# Patient Record
Sex: FEMALE | Race: WHITE | HISPANIC OR LATINO | Employment: UNEMPLOYED | URBAN - METROPOLITAN AREA
[De-identification: names, ages, dates, MRNs, and addresses within clinical notes are randomized per-mention and may not be internally consistent; named-entity substitution may affect disease eponyms.]

---

## 2017-06-21 ENCOUNTER — ANESTHESIA EVENT (OUTPATIENT)
Dept: GASTROENTEROLOGY | Facility: HOSPITAL | Age: 62
End: 2017-06-21
Payer: COMMERCIAL

## 2017-06-22 ENCOUNTER — GENERIC CONVERSION - ENCOUNTER (OUTPATIENT)
Dept: OTHER | Facility: OTHER | Age: 62
End: 2017-06-22

## 2017-06-22 ENCOUNTER — ANESTHESIA (OUTPATIENT)
Dept: GASTROENTEROLOGY | Facility: HOSPITAL | Age: 62
End: 2017-06-22
Payer: COMMERCIAL

## 2017-06-22 ENCOUNTER — HOSPITAL ENCOUNTER (OUTPATIENT)
Facility: HOSPITAL | Age: 62
Setting detail: OUTPATIENT SURGERY
Discharge: HOME | End: 2017-06-22
Attending: INTERNAL MEDICINE | Admitting: INTERNAL MEDICINE
Payer: COMMERCIAL

## 2017-06-22 VITALS
SYSTOLIC BLOOD PRESSURE: 121 MMHG | TEMPERATURE: 98.3 F | RESPIRATION RATE: 18 BRPM | OXYGEN SATURATION: 97 % | DIASTOLIC BLOOD PRESSURE: 61 MMHG | HEART RATE: 56 BPM

## 2017-06-22 DIAGNOSIS — R10.13 EPIGASTRIC PAIN: ICD-10-CM

## 2017-06-22 DIAGNOSIS — Z12.11 ENCOUNTER FOR SCREENING FOR MALIGNANT NEOPLASM OF COLON: ICD-10-CM

## 2017-06-22 DIAGNOSIS — K21.9 ACID REFLUX DISEASE: ICD-10-CM

## 2017-06-22 PROCEDURE — 88305 TISSUE EXAM BY PATHOLOGIST: CPT | Performed by: INTERNAL MEDICINE

## 2017-06-22 PROCEDURE — 88342 IMHCHEM/IMCYTCHM 1ST ANTB: CPT | Performed by: INTERNAL MEDICINE

## 2017-06-22 RX ORDER — METOPROLOL TARTRATE 50 MG/1
50 TABLET, FILM COATED ORAL 2 TIMES DAILY
COMMUNITY
End: 2018-04-20 | Stop reason: SDUPTHER

## 2017-06-22 RX ORDER — ATORVASTATIN CALCIUM 80 MG/1
80 TABLET, FILM COATED ORAL DAILY
COMMUNITY
End: 2018-04-16 | Stop reason: SDUPTHER

## 2017-06-22 RX ORDER — PROPOFOL 10 MG/ML
INJECTION, EMULSION INTRAVENOUS AS NEEDED
Status: DISCONTINUED | OUTPATIENT
Start: 2017-06-22 | End: 2017-06-22 | Stop reason: SURG

## 2017-06-22 RX ORDER — ISOSORBIDE DINITRATE 30 MG/1
30 TABLET ORAL 4 TIMES DAILY
COMMUNITY
End: 2018-04-20

## 2017-06-22 RX ORDER — LOSARTAN POTASSIUM 100 MG/1
100 TABLET ORAL DAILY
COMMUNITY
End: 2018-04-20 | Stop reason: SDUPTHER

## 2017-06-22 RX ORDER — ASPIRIN 81 MG/1
81 TABLET ORAL DAILY
COMMUNITY
End: 2018-02-08

## 2017-06-22 RX ORDER — SODIUM CHLORIDE 9 MG/ML
125 INJECTION, SOLUTION INTRAVENOUS CONTINUOUS
Status: DISCONTINUED | OUTPATIENT
Start: 2017-06-22 | End: 2017-06-22 | Stop reason: HOSPADM

## 2017-06-22 RX ORDER — PROPOFOL 10 MG/ML
INJECTION, EMULSION INTRAVENOUS CONTINUOUS PRN
Status: DISCONTINUED | OUTPATIENT
Start: 2017-06-22 | End: 2017-06-22 | Stop reason: SURG

## 2017-06-22 RX ORDER — LORATADINE AND PSEUDOEPHEDRINE 10; 240 MG/1; MG/1
1 TABLET, EXTENDED RELEASE ORAL DAILY
COMMUNITY
End: 2018-04-20

## 2017-06-22 RX ORDER — LATANOPROST 50 UG/ML
1 SOLUTION/ DROPS OPHTHALMIC
COMMUNITY

## 2017-06-22 RX ORDER — NITROGLYCERIN 0.4 MG/1
0.4 TABLET SUBLINGUAL
COMMUNITY
End: 2018-12-10 | Stop reason: SDUPTHER

## 2017-06-22 RX ORDER — RANOLAZINE 500 MG/1
500 TABLET, EXTENDED RELEASE ORAL 2 TIMES DAILY
COMMUNITY
End: 2018-04-05 | Stop reason: SDUPTHER

## 2017-06-22 RX ORDER — OMEPRAZOLE 40 MG/1
40 CAPSULE, DELAYED RELEASE ORAL DAILY
COMMUNITY
End: 2018-04-20 | Stop reason: SDUPTHER

## 2017-06-22 RX ORDER — CLOPIDOGREL BISULFATE 75 MG/1
75 TABLET ORAL DAILY
COMMUNITY
End: 2018-02-08

## 2017-06-22 RX ORDER — ACETAMINOPHEN 500 MG
500 TABLET ORAL EVERY 6 HOURS PRN
COMMUNITY

## 2017-06-22 RX ADMIN — PROPOFOL 80 MG: 10 INJECTION, EMULSION INTRAVENOUS at 14:15

## 2017-06-22 RX ADMIN — PROPOFOL 150 MCG/KG/MIN: 10 INJECTION, EMULSION INTRAVENOUS at 14:15

## 2017-06-22 RX ADMIN — SODIUM CHLORIDE: 0.9 INJECTION, SOLUTION INTRAVENOUS at 14:11

## 2017-06-22 RX ADMIN — SODIUM CHLORIDE 125 ML/HR: 0.9 INJECTION, SOLUTION INTRAVENOUS at 14:02

## 2017-06-27 ENCOUNTER — GENERIC CONVERSION - ENCOUNTER (OUTPATIENT)
Dept: OTHER | Facility: OTHER | Age: 62
End: 2017-06-27

## 2017-08-10 ENCOUNTER — ALLSCRIPTS OFFICE VISIT (OUTPATIENT)
Dept: OTHER | Facility: OTHER | Age: 62
End: 2017-08-10

## 2017-08-10 ENCOUNTER — HOSPITAL ENCOUNTER (OUTPATIENT)
Dept: NON INVASIVE DIAGNOSTICS | Facility: HOSPITAL | Age: 62
Discharge: HOME | End: 2017-08-10
Payer: COMMERCIAL

## 2017-08-10 DIAGNOSIS — I82.409 ACUTE EMBOLISM AND THROMBOSIS OF DEEP VEIN OF LOWER EXTREMITY (HCC): ICD-10-CM

## 2017-08-10 DIAGNOSIS — M79.661 PAIN OF RIGHT LOWER LEG: ICD-10-CM

## 2017-08-10 DIAGNOSIS — E78.5 HYPERLIPIDEMIA: ICD-10-CM

## 2017-08-10 DIAGNOSIS — L02.211 CUTANEOUS ABSCESS OF ABDOMINAL WALL: ICD-10-CM

## 2017-08-10 PROCEDURE — 93970 EXTREMITY STUDY: CPT

## 2017-08-14 ENCOUNTER — ALLSCRIPTS OFFICE VISIT (OUTPATIENT)
Dept: OTHER | Facility: OTHER | Age: 62
End: 2017-08-14

## 2017-08-14 ENCOUNTER — APPOINTMENT (OUTPATIENT)
Dept: LAB | Facility: CLINIC | Age: 62
End: 2017-08-14
Payer: COMMERCIAL

## 2017-08-14 DIAGNOSIS — L02.211 CUTANEOUS ABSCESS OF ABDOMINAL WALL: ICD-10-CM

## 2017-08-14 DIAGNOSIS — E78.5 HYPERLIPIDEMIA: ICD-10-CM

## 2017-08-14 LAB
ALBUMIN SERPL BCP-MCNC: 3.6 G/DL (ref 3.5–5)
ALP SERPL-CCNC: 92 U/L (ref 46–116)
ALT SERPL W P-5'-P-CCNC: 23 U/L (ref 12–78)
ANION GAP SERPL CALCULATED.3IONS-SCNC: 8 MMOL/L (ref 4–13)
AST SERPL W P-5'-P-CCNC: 20 U/L (ref 5–45)
BASOPHILS # BLD AUTO: 0.04 THOUSANDS/ΜL (ref 0–0.1)
BASOPHILS NFR BLD AUTO: 1 % (ref 0–1)
BILIRUB SERPL-MCNC: 0.74 MG/DL (ref 0.2–1)
BUN SERPL-MCNC: 12 MG/DL (ref 5–25)
CALCIUM SERPL-MCNC: 9.3 MG/DL (ref 8.3–10.1)
CHLORIDE SERPL-SCNC: 105 MMOL/L (ref 100–108)
CHOLEST SERPL-MCNC: 151 MG/DL (ref 50–200)
CO2 SERPL-SCNC: 26 MMOL/L (ref 21–32)
CREAT SERPL-MCNC: 0.84 MG/DL (ref 0.6–1.3)
EOSINOPHIL # BLD AUTO: 0.46 THOUSAND/ΜL (ref 0–0.61)
EOSINOPHIL NFR BLD AUTO: 6 % (ref 0–6)
ERYTHROCYTE [DISTWIDTH] IN BLOOD BY AUTOMATED COUNT: 13.9 % (ref 11.6–15.1)
EST. AVERAGE GLUCOSE BLD GHB EST-MCNC: 117 MG/DL
GFR SERPL CREATININE-BSD FRML MDRD: 74 ML/MIN/1.73SQ M
GLUCOSE P FAST SERPL-MCNC: 89 MG/DL (ref 65–99)
HBA1C MFR BLD: 5.7 % (ref 4.2–6.3)
HCT VFR BLD AUTO: 39.8 % (ref 34.8–46.1)
HDLC SERPL-MCNC: 46 MG/DL (ref 40–60)
HGB BLD-MCNC: 13.1 G/DL (ref 11.5–15.4)
LDLC SERPL CALC-MCNC: 75 MG/DL (ref 0–100)
LYMPHOCYTES # BLD AUTO: 2.25 THOUSANDS/ΜL (ref 0.6–4.47)
LYMPHOCYTES NFR BLD AUTO: 29 % (ref 14–44)
MCH RBC QN AUTO: 29 PG (ref 26.8–34.3)
MCHC RBC AUTO-ENTMCNC: 32.9 G/DL (ref 31.4–37.4)
MCV RBC AUTO: 88 FL (ref 82–98)
MONOCYTES # BLD AUTO: 0.92 THOUSAND/ΜL (ref 0.17–1.22)
MONOCYTES NFR BLD AUTO: 12 % (ref 4–12)
NEUTROPHILS # BLD AUTO: 4.18 THOUSANDS/ΜL (ref 1.85–7.62)
NEUTS SEG NFR BLD AUTO: 52 % (ref 43–75)
NRBC BLD AUTO-RTO: 0 /100 WBCS
PLATELET # BLD AUTO: 214 THOUSANDS/UL (ref 149–390)
PMV BLD AUTO: 12.7 FL (ref 8.9–12.7)
POTASSIUM SERPL-SCNC: 4.5 MMOL/L (ref 3.5–5.3)
PROT SERPL-MCNC: 7.3 G/DL (ref 6.4–8.2)
RBC # BLD AUTO: 4.52 MILLION/UL (ref 3.81–5.12)
SODIUM SERPL-SCNC: 139 MMOL/L (ref 136–145)
TRIGL SERPL-MCNC: 151 MG/DL
WBC # BLD AUTO: 7.88 THOUSAND/UL (ref 4.31–10.16)

## 2017-08-14 PROCEDURE — 80061 LIPID PANEL: CPT

## 2017-08-14 PROCEDURE — 83036 HEMOGLOBIN GLYCOSYLATED A1C: CPT

## 2017-08-14 PROCEDURE — 85025 COMPLETE CBC W/AUTO DIFF WBC: CPT

## 2017-08-14 PROCEDURE — 80053 COMPREHEN METABOLIC PANEL: CPT

## 2017-08-14 PROCEDURE — 36415 COLL VENOUS BLD VENIPUNCTURE: CPT

## 2017-08-21 ENCOUNTER — ALLSCRIPTS OFFICE VISIT (OUTPATIENT)
Dept: OTHER | Facility: OTHER | Age: 62
End: 2017-08-21

## 2017-08-26 ENCOUNTER — GENERIC CONVERSION - ENCOUNTER (OUTPATIENT)
Dept: OTHER | Facility: OTHER | Age: 62
End: 2017-08-26

## 2017-09-05 ENCOUNTER — TRANSCRIBE ORDERS (OUTPATIENT)
Dept: LAB | Facility: CLINIC | Age: 62
End: 2017-09-05

## 2017-09-05 ENCOUNTER — APPOINTMENT (OUTPATIENT)
Dept: LAB | Facility: CLINIC | Age: 62
End: 2017-09-05
Payer: COMMERCIAL

## 2017-09-05 DIAGNOSIS — R31.21 ASYMPTOMATIC MICROSCOPIC HEMATURIA: Primary | ICD-10-CM

## 2017-09-05 DIAGNOSIS — I82.409 ACUTE EMBOLISM AND THROMBOSIS OF DEEP VEIN OF LOWER EXTREMITY (HCC): ICD-10-CM

## 2017-09-05 LAB
BACTERIA UR QL AUTO: NORMAL /HPF
BILIRUB UR QL STRIP: NEGATIVE
CLARITY UR: NORMAL
COLOR UR: YELLOW
GLUCOSE UR STRIP-MCNC: NEGATIVE MG/DL
HGB UR QL STRIP.AUTO: NEGATIVE
HYALINE CASTS #/AREA URNS LPF: NORMAL /LPF
INR PPP: 1.28 (ref 0.86–1.16)
KETONES UR STRIP-MCNC: NEGATIVE MG/DL
LEUKOCYTE ESTERASE UR QL STRIP: NEGATIVE
NITRITE UR QL STRIP: NEGATIVE
NON-SQ EPI CELLS URNS QL MICRO: NORMAL /HPF
PH UR STRIP.AUTO: 7.5 [PH] (ref 4.5–8)
PROT UR STRIP-MCNC: NEGATIVE MG/DL
PROTHROMBIN TIME: 16.1 SECONDS (ref 12.1–14.4)
RBC #/AREA URNS AUTO: NORMAL /HPF
SP GR UR STRIP.AUTO: 1.01 (ref 1–1.03)
UROBILINOGEN UR QL STRIP.AUTO: 0.2 E.U./DL
WBC #/AREA URNS AUTO: NORMAL /HPF

## 2017-09-05 PROCEDURE — 81001 URINALYSIS AUTO W/SCOPE: CPT

## 2017-09-05 PROCEDURE — 85610 PROTHROMBIN TIME: CPT

## 2017-09-05 PROCEDURE — 36415 COLL VENOUS BLD VENIPUNCTURE: CPT

## 2017-11-02 ENCOUNTER — APPOINTMENT (OUTPATIENT)
Dept: LAB | Facility: CLINIC | Age: 62
End: 2017-11-02
Payer: COMMERCIAL

## 2017-11-02 ENCOUNTER — TRANSCRIBE ORDERS (OUTPATIENT)
Dept: LAB | Facility: CLINIC | Age: 62
End: 2017-11-02

## 2017-11-02 DIAGNOSIS — R31.29 MICROSCOPIC HEMATURIA: Primary | ICD-10-CM

## 2017-11-02 LAB
BACTERIA UR QL AUTO: ABNORMAL /HPF
BILIRUB UR QL STRIP: NEGATIVE
CLARITY UR: CLEAR
COLOR UR: YELLOW
GLUCOSE UR STRIP-MCNC: NEGATIVE MG/DL
HGB UR QL STRIP.AUTO: NEGATIVE
HYALINE CASTS #/AREA URNS LPF: ABNORMAL /LPF
KETONES UR STRIP-MCNC: NEGATIVE MG/DL
LEUKOCYTE ESTERASE UR QL STRIP: ABNORMAL
NITRITE UR QL STRIP: NEGATIVE
NON-SQ EPI CELLS URNS QL MICRO: ABNORMAL /HPF
PH UR STRIP.AUTO: 7 [PH] (ref 4.5–8)
PROT UR STRIP-MCNC: NEGATIVE MG/DL
RBC #/AREA URNS AUTO: ABNORMAL /HPF
SP GR UR STRIP.AUTO: 1.01 (ref 1–1.03)
UROBILINOGEN UR QL STRIP.AUTO: 0.2 E.U./DL
WBC #/AREA URNS AUTO: ABNORMAL /HPF

## 2017-11-02 PROCEDURE — 81001 URINALYSIS AUTO W/SCOPE: CPT

## 2017-11-13 ENCOUNTER — GENERIC CONVERSION - ENCOUNTER (OUTPATIENT)
Dept: OTHER | Facility: OTHER | Age: 62
End: 2017-11-13

## 2017-12-05 ENCOUNTER — ALLSCRIPTS OFFICE VISIT (OUTPATIENT)
Dept: OTHER | Facility: OTHER | Age: 62
End: 2017-12-05

## 2017-12-06 NOTE — PROGRESS NOTES
Assessment  Assessed    1  Angina, class I (413 9) (I20 9)   2  Benign essential hypertension (401 1) (I10)   3  CAD S/P percutaneous coronary angioplasty (414 01,V45 82) (I25 10,Z98 61)   4  Dyslipidemia (272 4) (E78 5)    Plan  Angina, class I    · Metoprolol Tartrate 50 MG Oral Tablet; TAKE 1 TABLET Every 8 hours   Rx By: Raymundo Colon; Dispense: 30 Days ; #:90 Tablet; Refill: 3;For: Angina, class I; KRISTYN = N; Verified Transmission to [id number]; Last Updated By: System, SureScripts; 12/5/2017 1:49:35 PM  Benign essential hypertension, CAD S/P percutaneous coronary angioplasty    · EKG/ECG- POC; Status:Complete;   Done: 61PYB3680   Perform: In Office; [phone]; Last Updated By:Scheier, Maurene Europe; 12/5/2017 1:49:03 PM;Ordered;essential hypertension, CAD S/P percutaneous coronary angioplasty; Ordered By:Darek Rosales;  Dyslipidemia    · Follow-up visit in 1 year Evaluation and Treatment  Follow-up  Status: Complete  Done:15Lqs4290   Ordered;Dyslipidemia; Ordered By: Raymundo Cooln Performed:  Due: 15MTK9739; Last Updated By: Marlyn Sylvester; 12/5/2017 2:00:16 PM    Discussion/Summary  Cardiology Discussion Summary Free Text Note Form [facility]:   Coronary artery disease, PTCA/stent of the LAD into the diagonal  Occluded LAD and occluded LIMA to the LAD, the diagonal collateralizes apical LAD  Mild to moderate disease in the circumflex and RCA system in the setting of a small coronary arteries  Angina class 1 very stable, continue clinical regimen  An echocardiogram 2015 revealed normal left ventricular systolic function and diastolic dysfunction stage I  Stress test prior to revascularization reveal anteroapical ischemia  Chief Complaint  Chief Complaint Free Text Note Form: Pt  here for yearly follow up  Pt  has no complaints  History of Present Illness  Cardiology HPI Free Text Note Form [facility]: Cardiology follow-up   Patient doing very well from a cardiac point of view, she has had not used any nitroglycerin, she is somewhat active  She did suffer serous subcutaneous infections in the abdominal wall that required extensive debridement, and antibiotic therapy, she is slowly recovering from that  She has been compliant with low-cholesterol diet  Lipids recently checked, total cholesterol 151, LDL 75, triglyceride 151, HDL 46, adequate control on high-intensity statin therapy  She also developed DVT of the legs, for which she was put on full anticoagulation, her Plavix was discontinued  She has been compliant with low-sodium diet  Her blood pressures been well controlled  Review of Systems  Cardiology Female ROS:    Cardiac: chest pain,-- rhythm problems-- and-- has heart murmur present, but-- as noted in HPI,-- no fainting/blackouts,-- no signs of swelling,-- no palpitations present,-- no syncope/fainting,-- no AM fatigue-- and-- no witnessed apnea episodes  Skin: non healing sores located on the abdomen, but-- as noted in HPI  Genitourinary: No complaints of recurrent urinary tract infections, frequent urination at night, difficult urination, blood in urine, kidney stones, loss of bladder control, kidney problems, denies any birth control or hormone replacement, is not post menopausal, not currently pregnant  ,-- no blood in urine-- and-- no kidney problems  Psychological: anxiety, but-- no depression  General: trouble sleeping,-- lack of energy/fatigue-- and-- frequent infections, but-- as noted in HPI,-- no appetite changes,-- no changes in weight,-- no fever-- and-- no night sweats  Respiratory: No complaints of shortness of breath, cough with sputum, or wheezing ,-- no shortness of breath-- and-- no hemoptysis    HEENT: No complaints of serious problems, hearing problems, nose problems, throat problems, or snoring ,-- no serious eye problems-- and-- no hearing problems  Gastrointestinal: abdonimal pain, but-- as noted in HPI,-- no liver problems-- and-- no rectal bleeding  Hematologic: blood clots, but-- as noted in HPI,-- no bleeding disorders,-- no anemia-- and-- no excessive bruising  Neurological: numbnes,-- tingling-- and-- daytime sleepiness, but-- no weakness,-- no seizures-- and-- no dizziness  Musculoskeletal: No complaints of arthritis, back pain, or painfull swelling ,-- no arthritis-- and-- no back pain-- No myalgias  Active Problems  Problems    1  Abscess of skin of abdomen (682 2) (L02 211)   2  Acid reflux disease (530 81) (K21 9)   3  Allergic rhinitis (477 9) (J30 9)   4  Angina, class I (413 9) (I20 9)   5  Angina, class III (413 9) (I20 9)   6  Benign essential hypertension (401 1) (I10)   7  CAD S/P percutaneous coronary angioplasty (414 01,V45 82) (I25 10,Z98 61)   8  Colon cancer screening (V76 51) (Z12 11)   9  Coronary artery disease (414 00) (I25 10)   10  Coronary artery disease (414 00) (I25 10)   11  Deep vein thrombosis (DVT) (453 40) (I82 409)   12  Dyslipidemia (272 4) (E78 5)   13  Edema, lower extremity (782 3) (R60 0)   14  Encounter for screening mammogram for breast cancer (V76 12) (Z12 31)   15  Epigastric pain (789 06) (R10 13)   16  Fatigue (780 79) (R53 83)   17  Glaucoma, open angle (365 10,365 70) (H40 10X0)   18  Hearing difficulty (389 9) (H91 90)   19  Heart disease (429 9) (I51 9)   20  Heart disease (429 9) (I51 9)   21  Hypertension (401 9) (I10)   22  Denied: History of Mental disorder   21  Myocardial infarction (410 90) (I21 9)   24  Osteoarthritis of knee (715 36) (M17 10)   25  Pap smear for cervical cancer screening (V76 2) (Z12 4)   26  Prediabetes (790 29) (R73 03)   27  Rib pain on left side (786 50) (R07 81)   28  Right calf pain (729 5) (M79 661)   29  Scoliosis (737 30) (M41 9)   30  Uterine prolapse (618 1) (N81 4)    Past Medical History  Problems    1  History of depression (V11 8) (Z86 59)   2  Denied: History of Mental disorder  Active Problems And Past Medical History Reviewed:    The active problems and past medical history were reviewed and updated today  Surgical History  Problems    1  History of Cath Stent Placement  Surgical History Reviewed: The surgical history was reviewed and updated today  Family History  Mother    1  Denied: Family history of Drug abuse   2  Family history of diabetes mellitus (V18 0) (Z83 3)   3  Family history of myocardial infarction (V17 3) (Z82 49)   4  No family history of mental disorder  Father    5  Denied: Family history of Drug abuse   6  Family history of cerebrovascular accident (CVA) (V17 1) (Z82 3)   7  No family history of mental disorder   8  Patient's father is  (V24 11) (Z80 80)  Sibling    5  Family history of cardiovascular disease (V17 49) (Z82 49)   10  Family history of hypertension (V17 49) (Z82 49)  Sister    6  Family history of hypothyroidism (V18 19) (Z83 49)  Family History Reviewed: The family history was reviewed and updated today  Social History  Problems    · Current non-drinker of alcohol (V49 89) (Z78 9)   · Former smoker (V15 82) (P96 416)   · Denied: History of drug use   · Lives with adult children   · [facility] History Reviewed: The social history was reviewed and is unchanged  Current Meds   1  Acetaminophen 500 MG Oral Tablet; Take 2 Tablet(s) every 6 hours as needed MDD:3000mg; Therapy: 05CVB3414 to (Last Rx:76Afx0907)  Requested for: 86JWK4890 Ordered   2  Aluminum Hydroxide Gel 320 MG/5ML Oral Suspension; USE AS DIRECTED; Therapy: 86AVH6825 to Recorded   3  Aspirin 81 MG TABS; TAKE 1 TABLET DAILY; Therapy: 94IWM0719 to (Evaluate:63Ugx2664)  Requested for: 61MZY7658; Last Rx:69Sze0739 Ordered   4  Atorvastatin Calcium 80 MG Oral Tablet; TAKE 1 TABLET DAILY; Therapy: 90GXY3400 to (Last Rx:26Adg1929)  Requested for: 25LDX1122 Ordered   5  Eliquis 5 MG Oral Tablet; Take one tablet every 12 hours; Therapy: 64ANT6600 to (Evaluate:68Mfn5488)  Requested for: 49Dbw0489; Last Rx:01Kwb1526 Ordered   6   Isosorbide Mononitrate ER 30 MG Oral Tablet Extended Release 24 Hour; TAKE 1 TABLET DAILY; Therapy: 79OVO4346 to 0479 89 05 16)  Requested for: 32AWR2766; Last Rx:08Mar2017 Ordered   7  Latanoprost SOLN; INSTILL 1 DROP IN BOTH EYES AT BEDTIME; Therapy: (Recorded:45Jls6668) to Recorded   8  Loratadine 10 MG Oral Tablet; TAKE 1 TABLET EVERY MORNING AS NEEDED; Therapy: 02FIF5497 to (Evaluate:41Yjb6499)  Requested for: 17Aug2017; Last Rx:17Aug2017; Status: ACTIVE - Renewal Denied Ordered   9  Losartan Potassium 100 MG Oral Tablet; take 1 tablet every day; Therapy: 84FFF2474 to (Evaluate:32Iwz3667)  Requested for: 73TFJ9975; Last Rx:39Bvz8782 Ordered   10  Metoprolol Tartrate 50 MG Oral Tablet; TAKE TABLET Twice daily  Requested for:  23Ozj0257; Last Rx:98Soq5981 Ordered   11  MoviPrep 100 GM Oral Solution Reconstituted; USE AS DIRECTED; Therapy: 99CAK3098 to (Last KJ:08FVS2439)  Requested for: 03MNR0457 Ordered   12  Nitrostat 0 4 MG Sublingual Tablet Sublingual; place 1 tablet under the tongue every 5  minutes up to 3 doses as needed for chest pain; Therapy: 09IIT0091 to (Evaluate:21Oct2016)  Requested for: 96XRU8480; Last  AX:69CEG9550 Ordered   13  Omeprazole 40 MG Oral Capsule Delayed Release; take 1 capsule daily; Therapy: 72LPM2874 to (Evaluate:11Jan2018)  Requested for: 63Rxt4556; Last  Rx:43Zrf8452 Ordered   14  Ranexa 500 MG Oral Tablet Extended Release 12 Hour; take 1 tablet twice a day; Therapy: 48GJJ3762 to )  Requested for: 98Sbk1162; Last  Rx:83Rgs5834 Ordered  Medication List Reviewed: The medication list was reviewed and updated today  Allergies  Medication    1   No Known Drug Allergies    Vitals  Vital Signs    Recorded: 15OIJ8559 01:40PM   Heart Rate 54, Apical   Pulse Quality Regular, Apical   Systolic 431, RUE, Sitting   Diastolic 78, RUE, Sitting   Height 5 ft 4 in   Weight 182 lb    BMI Calculated 31 24   BSA Calculated 1 88       Physical Exam   Constitutional  General appearance: No acute distress, well appearing and well nourished  appears healthy,-- overweight,-- well hydrated-- and-- appearance reflects stated age  Neck  Neck and thyroid: Normal, supple, trachea midline, no thyromegaly  Jugular Veins: the JVP was not elevated  Pulmonary  Respiratory effort: No increased work of breathing or signs of respiratory distress  Respiratory rate: normal  Assessment of respiratory effort revealed normal rhythm and effort  Auscultation of lungs: Clear to auscultation, no rales, no rhonchi, no wheezing, good air movement  Auscultation of the lungs revealed no expiratory wheezing,-- normal expiratory time-- and-- no inspiratory wheezing  no rales or crackles were heard bilaterally  no rhonchi  no friction rub  no wheezing  no diminished breath sounds  no bronchial breath sounds  Cardiovascular  Palpation of heart: Normal PMI, no thrills  The PMI was palpated at the 5th LICS in the midclavicular line  The apical impulse was normal  no precordial heave was noted  Auscultation of heart: Abnormal   The heart rate was normal  The rhythm was regular with premature beats  Heart sounds: normal S1,-- normal S2,-- no gallop heard,-- no click-- and-- the heart sounds were not distant  No pericardial rub  A grade 1 systolic murmur was heard at the RUSB  A grade 1 systolic murmur was heard at the LUSB  Carotid pulses: Normal, 2+ bilaterally  right 2+,-- no bruit heard over the right carotid,-- left 2+-- and-- no bruit heard over the left carotid  Pedal pulses: Normal, 2+ bilaterally  Posterior tibialis pulse was 2+ on the right-- and-- 2+ on the left  Dorsalis pedis pulse was 2+ on the right-- and-- 2+ on the left  Examination of extremities for edema and/or varicosities: Normal    Chest - Chest: Normal   Musculoskeletal Digits and nails: Normal without clubbing or cyanosis  Examination of the extremities revealed no fingernail clubbing-- and-- well perfused fingers    Neurologic - Speech: Normal  Psychiatric - Orientation to person, place, and time: Normal -- Mood and affect: Normal       Results/Data  ECG Report:  Rhythm and rate: sinus bradycardia  Ectopic Beats: Occasional unifocal premature ventricular beats are present  QRS: intraventricular conduction delay-- and-- left ventricular hypertrophy The T waves are inverted in lead(s) I and aVL, consistent with myocardial ischemia, consistent with left ventricular strain  Comparison to prior ECGs: no interval change  Health Management  Colon cancer screening   COLONOSCOPY (GI, SURG); every 10 years; Last 50RWN7449; Next Due: 57Oem2107;  Active    Signatures   Electronically signed by : LONDON Villalobos ; Dec  5 2017  2:04PM EST                       (Author)

## 2018-01-11 NOTE — RESULT NOTES
Verified Results  (1) CBC/PLT/DIFF 77TRY4907 09:53AM Marisela Girard Order Number: [id number]     Test Name Result Flag Reference   WBC COUNT 7 88 Thousand/uL  4 31-10 16   RBC COUNT 4 52 Million/uL  3 81-5 12   HEMOGLOBIN 13 1 g/dL  11 5-15 4   HEMATOCRIT 39 8 %  34 8-46  1   MCV 88 fL  82-98   MCH 29 0 pg  26 8-34 3   MCHC 32 9 g/dL  31 4-37 4   RDW 13 9 %  11 6-15 1   MPV 12 7 fL  8 9-12 7   PLATELET COUNT 849 Thousands/uL  149-390   nRBC AUTOMATED 0 /100 WBCs     NEUTROPHILS RELATIVE PERCENT 52 %  43-75   LYMPHOCYTES RELATIVE PERCENT 29 %  14-44   MONOCYTES RELATIVE PERCENT 12 %  4-12   EOSINOPHILS RELATIVE PERCENT 6 %  0-6   BASOPHILS RELATIVE PERCENT 1 %  0-1   NEUTROPHILS ABSOLUTE COUNT 4 18 Thousands/? ??L  1 85-7 62   LYMPHOCYTES ABSOLUTE COUNT 2 25 Thousands/? ??L  0 60-4 47   MONOCYTES ABSOLUTE COUNT 0 92 Thousand/? ??L  0 17-1 22   EOSINOPHILS ABSOLUTE COUNT 0 46 Thousand/? ??L  0 00-0 61   BASOPHILS ABSOLUTE COUNT 0 04 Thousands/? ??L  0 00-0 10     (1) COMPREHENSIVE METABOLIC PANEL 20NMN7970 92:97GR Marisela Girard Order Number: [id number]     Test Name Result Flag Reference   SODIUM 139 mmol/L  136-145   POTASSIUM 4 5 mmol/L  3 5-5 3   CHLORIDE 105 mmol/L  100-108   CARBON DIOXIDE 26 mmol/L  21-32   ANION GAP (CALC) 8 mmol/L  4-13   BLOOD UREA NITROGEN 12 mg/dL  5-25   CREATININE 0 84 mg/dL  0 60-1 30   Standardized to IDMS reference method   CALCIUM 9 3 mg/dL  8 3-10 1   BILI, TOTAL 0 74 mg/dL  0 20-1 00   ALK PHOSPHATAS 92 U/L     ALT (SGPT) 23 U/L  12-78   Specimen collection should occur prior to Sulfasalazine and/or Sulfapyridine administration due to the potential for falsely depressed results  AST(SGOT) 20 U/L  5-45   Specimen collection should occur prior to Sulfasalazine administration due to the potential for falsely depressed results     ALBUMIN 3 6 g/dL  3 5-5 0   TOTAL PROTEIN 7 3 g/dL  6 4-8 2   eGFR 74 ml/min/1 73sq m     National Kidney Disease Education Program recommendations are as follows:  GFR calculation is accurate only with a steady state creatinine  Chronic Kidney disease less than 60 ml/min/1 73 sq  meters  Kidney failure less than 15 ml/min/1 73 sq  meters  GLUCOSE FASTING 89 mg/dL  65-99   Specimen collection should occur prior to Sulfasalazine administration due to the potential for falsely depressed results  Specimen collection should occur prior to Sulfapyridine administration due to the potential for falsely elevated results  (1) HEMOGLOBIN A1C 96SVV0241 09:53AM Ashley EpsteinYessy Order Number: [id number]     Test Name Result Flag Reference   HEMOGLOBIN A1C 5 7 %  4 2-6 3   EST  AVG  GLUCOSE 117 mg/dl       (1) LIPID PANEL FASTING W DIRECT LDL REFLEX 39IUG1143 09:53AM Derick Payment   TW Order Number: [id number]     Test Name Result Flag Reference   CHOLESTEROL 151 mg/dL     LDL CHOLESTEROL CALCULATED 75 mg/dL  0-100   Triglyceride:        Normal ??? ??? ??? ??? ??? ??? ??? <150 mg/dl   ??? ??? ???Borderline High ??? ??? 150-199 mg/dl   ??? ??? ? ?? High ??? ??? ??? ??? ??? ??? ??? 200-499 mg/dl   ??? ??? ? ??Very High ??? ??? ??? ??? ??? >499 mg/dl      Cholesterol:       Desirable ??? ??? ??? ??? <200 mg/dl   ??? ??? Borderline High ??? 200-239 mg/dl   ??? ??? High ??? ??? ??? ??? ??? ??? >239 mg/dl      HDL Cholesterol:       High ??? ???>59 mg/dL   ??? ??? Low ??? ??? <41 mg/dL      HDL Cholesterol:       High ??? ???>59 mg/dL   ??? ??? Low ??? ??? <41 mg/dL      This screening LDL is a calculated result  It does not have the accuracy of the Direct Measured LDL in the monitoring of patients with hyperlipidemia and/or statin therapy  Direct Measure LDL (EEX518) must be ordered separately in these patients  TRIGLYCERIDES 151 mg/dL H <=150   Specimen collection should occur prior to N-Acetylcysteine or Metamizole administration due to the potential for falsely depressed results     HDL,DIRECT 46 mg/dL  40-60 Specimen collection should occur prior to Metamizole administration due to the potential for falsley depressed results

## 2018-01-12 VITALS
RESPIRATION RATE: 18 BRPM | BODY MASS INDEX: 30.11 KG/M2 | TEMPERATURE: 97.3 F | SYSTOLIC BLOOD PRESSURE: 125 MMHG | HEIGHT: 64 IN | HEART RATE: 72 BPM | DIASTOLIC BLOOD PRESSURE: 79 MMHG | WEIGHT: 176.38 LBS

## 2018-01-12 VITALS
HEIGHT: 64 IN | WEIGHT: 179 LBS | BODY MASS INDEX: 30.56 KG/M2 | RESPIRATION RATE: 18 BRPM | SYSTOLIC BLOOD PRESSURE: 132 MMHG | TEMPERATURE: 96.7 F | HEART RATE: 65 BPM | OXYGEN SATURATION: 99 % | DIASTOLIC BLOOD PRESSURE: 76 MMHG

## 2018-01-12 NOTE — RESULT NOTES
Discussion/Summary   Negative     Verified Results  (1) TISSUE EXAM 62TOK9921 02:26PM Joselin Méndez     Test Name Result Flag Reference   LAB AP CASE REPORT (Report)     Surgical Pathology Report             Case: [id number]                   Authorizing Provider: Katina White MD      Collected:      06/22/2017 1426        Ordering Location:   [address] [place] [address]   Received:      06/23/2017 2016 [facility]                               Pathologist:      Nicolle Reyes MD                              Specimens:  A) - Stomach, R/o h pylori                                       B) - Duodenum, R/o celiac   LAB AP FINAL DIAGNOSIS (Report)     A  Stomach, biopsy:    - Oxyntic mucosa without significant pathologic abnormalities  - No Helicobacter pylori organisms identified on immunostaining     - No intestinal metaplasia, dysplasia or neoplasia identified  B  Duodenum, biopsy:    - No significant pathologic abnormalities  - No villous atrophy, increased intraepithelial lymphocytes or crypt   hyperplasia to suggest     malabsorptive enteropathy     - No active inflammation, granulomas, organisms, dysplasia or neoplasia   identified  Electronically signed by Nicolle Reyes MD on 6/26/2017 at 3:52 PM   LAB AP SURGICAL ADDITIONAL INFORMATION (Report)     These tests were developed and their performance characteristics   determined by [facility] or [address] [place] [address]  They may not be cleared or approved by the [place]  Food and   Drug Administration  The FDA has determined that such clearance or   approval is not necessary  These tests are used for clinical purposes  They should not be regarded as investigational or for research  This   laboratory has been approved by [place] [address], designated as a high-complexity   laboratory and is qualified to perform these tests  LAB AP GROSS DESCRIPTION (Report)     A   The specimen is received in formalin, labeled with the patient's name   and hospital number, and is designated stomach biopsy  The specimen   consists of a single white to tan soft tissue fragment measuring 0 6 x 0 2   x 0 2 cm  Entirely submitted  One cassette  B  The specimen is received in formalin, labeled with the patient's name   and hospital number, and is designated duodenum biopsy  The specimen   consists of a single white to tan soft tissue fragment measuring 0 4 x 0 3   x 0 2 cm  Entirely submitted  One cassette  Note: The estimated total formalin fixation time based upon information   provided by the submitting clinician and the standard processing schedule   is 23 5 hours      S

## 2018-01-13 VITALS
RESPIRATION RATE: 18 BRPM | BODY MASS INDEX: 29.77 KG/M2 | DIASTOLIC BLOOD PRESSURE: 81 MMHG | TEMPERATURE: 97.2 F | HEART RATE: 80 BPM | SYSTOLIC BLOOD PRESSURE: 123 MMHG | WEIGHT: 174.38 LBS | HEIGHT: 64 IN

## 2018-01-14 VITALS
WEIGHT: 177.25 LBS | HEART RATE: 80 BPM | DIASTOLIC BLOOD PRESSURE: 77 MMHG | BODY MASS INDEX: 30.26 KG/M2 | TEMPERATURE: 96.9 F | SYSTOLIC BLOOD PRESSURE: 122 MMHG | RESPIRATION RATE: 18 BRPM | HEIGHT: 64 IN
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
## 2018-01-23 VITALS
HEART RATE: 54 BPM | HEIGHT: 64 IN | SYSTOLIC BLOOD PRESSURE: 124 MMHG | DIASTOLIC BLOOD PRESSURE: 78 MMHG | WEIGHT: 182 LBS | BODY MASS INDEX: 31.07 KG/M2

## 2018-02-08 ENCOUNTER — OFFICE VISIT (OUTPATIENT)
Dept: FAMILY MEDICINE CLINIC | Facility: CLINIC | Age: 63
End: 2018-02-08
Payer: COMMERCIAL

## 2018-02-08 VITALS
SYSTOLIC BLOOD PRESSURE: 118 MMHG | WEIGHT: 186 LBS | OXYGEN SATURATION: 98 % | BODY MASS INDEX: 34.23 KG/M2 | HEIGHT: 62 IN | HEART RATE: 68 BPM | DIASTOLIC BLOOD PRESSURE: 68 MMHG | RESPIRATION RATE: 18 BRPM | TEMPERATURE: 97.6 F

## 2018-02-08 DIAGNOSIS — G25.81 RESTLESS LEG SYNDROME: ICD-10-CM

## 2018-02-08 DIAGNOSIS — J06.9 ACUTE UPPER RESPIRATORY INFECTION: Primary | ICD-10-CM

## 2018-02-08 PROBLEM — R73.03 PREDIABETES: Status: ACTIVE | Noted: 2017-08-26

## 2018-02-08 PROBLEM — I82.409 DEEP VEIN THROMBOSIS (DVT) (HCC): Status: RESOLVED | Noted: 2017-08-13 | Resolved: 2018-02-08

## 2018-02-08 PROBLEM — I82.409 DEEP VEIN THROMBOSIS (DVT) (HCC): Status: ACTIVE | Noted: 2017-08-13

## 2018-02-08 PROCEDURE — 99213 OFFICE O/P EST LOW 20 MIN: CPT | Performed by: NURSE PRACTITIONER

## 2018-02-08 RX ORDER — ALBUTEROL SULFATE 90 UG/1
2 AEROSOL, METERED RESPIRATORY (INHALATION) EVERY 6 HOURS PRN
Qty: 1 INHALER | Refills: 0 | Status: SHIPPED | OUTPATIENT
Start: 2018-02-08 | End: 2018-04-20

## 2018-02-08 RX ORDER — LORATADINE 10 MG/1
TABLET ORAL
Refills: 3 | COMMUNITY
Start: 2018-01-10 | End: 2018-04-20 | Stop reason: SDUPTHER

## 2018-02-08 RX ORDER — ROPINIROLE 0.5 MG/1
0.5 TABLET, FILM COATED ORAL
Qty: 30 TABLET | Refills: 3 | Status: SHIPPED | OUTPATIENT
Start: 2018-02-08 | End: 2018-04-20 | Stop reason: SDUPTHER

## 2018-02-08 RX ORDER — AZITHROMYCIN 250 MG/1
TABLET, FILM COATED ORAL
Qty: 6 TABLET | Refills: 0 | Status: SHIPPED | OUTPATIENT
Start: 2018-02-08 | End: 2018-02-13

## 2018-02-08 RX ORDER — ISOSORBIDE MONONITRATE 30 MG/1
1 TABLET, EXTENDED RELEASE ORAL DAILY
COMMUNITY
Start: 2015-10-30 | End: 2018-12-10 | Stop reason: SDUPTHER

## 2018-02-08 NOTE — PATIENT INSTRUCTIONS
Síndrome de las piernas inquietas (SPI)   CUIDADO AMBULATORIO:   El síndrome de las piernas inquietas (SPI)  es anival condición que causa anival urgencia muy tierra de  las piernas y los pies  También puede tener sensación de hormigueo, arrastramiento, picazón o sensación punzante en las piernas  Puede tener molestias o dolor  El movimiento calma los síntomas por un corto [place]  El SPI generalmente empeora al final del día y por las noches  Rose síntomas pueden incluso ir y venir por días o semanas a la vez y hasta empeorar perlita periodos de estrés  Pregúntele a gallo Reyne Rave vitaminas y minerales son adecuados para usted  · No puede dormir debido a rose síntomas  · Rose síntomas están empeorando  · Usted tiene preguntas o inquietudes acerca de gallo condición o cuidado  Medicamentos:   · Medicamentos,  podrían ayudar a disminuir los síntomas del SPI  · [place] rose medicamentos ingris se le haya indicado  Consulte con gallo médico si usted pamela que gallo medicamento no le está ayudando o si presenta efectos secundarios  Infórmele si es alérgico a algún medicamento  Mantenga anival lista actualizada de los [place], las vitaminas y los productos herbales que pramod  Incluya los siguientes datos de los medicamentos: cantidad, frecuencia y motivo de administración  Traiga con usted la lista o los envases de la píldoras a rose citas de seguimiento  Lleve la lista de los medicamentos con usted en manny de anival emergencia  El [place] de gallo síntomas:   · Mantenga rose piernas cálidas  Use calcetines gruesos o anival cobija eléctrica  También puede ser de [place] courtney un baño de be caliente o hacerse un masaje en las piernas antes de [place]  · Realice actividad física con regularidad  La actividad física moderada ingris caminar y estrechar los músculos podrían aliviar rose síntomas  Pregunte a gallo médico acerca del mejor plan de ejercicio para usted  · Duerma lo suficiente    Celestino vez necesite acostarse más temprano para dormir lo necesario  Acuéstese y levántese de valenzuela cama al mismo tiempo todos los días  · No kristen cafeína ni alcohol en la noche  No fume ni use productos de tabaco en la noche  La cafeína, el alcohol y el tabaco pueden impedir que usted duerma miguel angel  Acuda a jaimee consultas de control con valenzuela médico según le indicaron  Anote jaimee preguntas para que se acuerde de hacerlas perlita jaimee visitas  © 2017 [address] Information is for End User's use only and may not be sold, redistributed or otherwise used for commercial purposes  All illustrations and images included in CareNotes® are the copyrighted property of A D A M , Inc  or Abdiel Warren  Esta información es sólo para uso en educación  Valenzuela intención no es darle un consejo médico sobre enfermedades o tratamientos  Colsulte con valenzuela Ric Keas farmacéutico antes de seguir cualquier régimen médico para saber si es seguro y efectivo para usted

## 2018-02-08 NOTE — PROGRESS NOTES
Assessment/Plan:    Restless leg syndrome  Will try ropinirole for restless leg symptoms  Follow up if not improving  Acute upper respiratory infection  Will begin azithromycin  Begin ventolin inhaler for chest tightness  Use OTC mucinex DM  Increase fluids and rest   Follow up if symptoms not improving or worsening  Problem List Items Addressed This Visit     Acute upper respiratory infection - Primary     Will begin azithromycin  Begin ventolin inhaler for chest tightness  Use OTC mucinex DM  Increase fluids and rest   Follow up if symptoms not improving or worsening  Relevant Medications    azithromycin (ZITHROMAX) 250 mg tablet    albuterol (VENTOLIN HFA) 90 mcg/act inhaler    Restless leg syndrome     Will try ropinirole for restless leg symptoms  Follow up if not improving  Relevant Medications    rOPINIRole (REQUIP) 0 5 mg tablet            Vitals:    02/08/18 1451   BP: 118/68   Pulse: 68   Resp: 18   Temp: 97 6 °F (36 4 °C)   SpO2: 98%       Subjective:      Patient ID: Aroldo Johnston is a 58 y o  female  Here with daughter for complaint of cough with chest tightness that began 3 days ago  Also having body aches  Had cold-like symptoms 2 weeks ago that seemed to be resolving  Denies fever/chills  Daughter also reports complaint of headache that occurs at night and continues if she does not take OTC tylenol  Sometimes has associated dizziness and nausea with headache  Does have a history of glaucoma  Has follow up appt on Monday with eye doctor  Also complains of burning sensation in toes and outer aspect of both feet  States that burning begins at night when she tries to go to sleep  Denies symptoms during the daytime hours  Also feels like legs are restless  The following portions of the patient's history were reviewed and updated as appropriate:   She  has a past medical history of Coronary artery disease; History of angioplasty;  History of cardiac cath (2003); Hyperlipidemia; and Hypertension  She  does not have any pertinent problems on file  She  has a past surgical history that includes Cardiac surgery (2000); Cholecystectomy; EAR SURGERY (1984); and pr colonoscopy flx dx w/collj spec when pfrmd (N/A, 6/22/2017)  Her family history is not on file  She  reports that she has quit smoking  She has never used smokeless tobacco  She reports that she does not drink alcohol or use drugs  Current Outpatient Prescriptions   Medication Sig Dispense Refill    apixaban (ELIQUIS) 5 mg Take 1 tablet by mouth every 12 (twelve) hours      aspirin 81 MG tablet Take 1 tablet by mouth daily      conjugated estrogens (PREMARIN) vaginal cream Apply a pea size amount of the estrogen cream to the opening of the vagina three nights per week   isosorbide mononitrate (IMDUR) 30 mg 24 hr tablet Take 1 tablet by mouth daily      acetaminophen (TYLENOL) 500 mg tablet Take 500 mg by mouth every 6 (six) hours as needed for mild pain      albuterol (VENTOLIN HFA) 90 mcg/act inhaler Inhale 2 puffs every 6 (six) hours as needed for wheezing 1 Inhaler 0    aluminum hydroxide [facility]) Apply topically as needed for wound care      atorvastatin (LIPITOR) 80 mg tablet Take 80 mg by mouth daily      azithromycin (ZITHROMAX) 250 mg tablet Take 2 tablets on day one, then take one tablet daily for 4 days  6 tablet 0    isosorbide dinitrate (ISORDIL) 30 mg tablet Take 30 mg by mouth 4 (four) times a day      latanoprost (XALATAN) 0 005 % ophthalmic solution 1 drop daily at bedtime      loratadine (CLARITIN) 10 mg tablet TAKE 1 TABLET EVERY MORNING AS NEEDED    3    loratadine-pseudoephedrine (CLARITIN-D 24-HOUR)  mg per 24 hr tablet Take 1 tablet by mouth daily      losartan (COZAAR) 100 MG tablet Take 100 mg by mouth daily      metoprolol tartrate (LOPRESSOR) 50 mg tablet Take 50 mg by mouth 2 (two) times a day      nitroglycerin (NITROSTAT) 0 4 mg SL tablet Place 0 4 mg under the tongue every 5 (five) minutes as needed for chest pain      omeprazole (PriLOSEC) 40 MG capsule Take 40 mg by mouth daily      ranolazine (RANEXA) 500 mg 12 hr tablet Take 500 mg by mouth 2 (two) times a day      rOPINIRole (REQUIP) 0 5 mg tablet Take 1 tablet (0 5 mg total) by mouth daily at bedtime 30 tablet 3     No current facility-administered medications for this visit  She is allergic to ibuprofen       Review of Systems   Constitutional: Positive for fatigue  Negative for chills and fever  HENT: Negative for congestion, sinus pain and sinus pressure  Throat is scratchy  Respiratory: Positive for cough, chest tightness and wheezing  Cardiovascular: Negative for chest pain and leg swelling  Musculoskeletal:        Burning pain in toes and outer feet at night when lying down to go to sleep  Denies pain with ambulation or exercise  Skin: Negative for rash  Neurological: Positive for headaches  Having headaches that begin at night and resolve with tylenol  Also has dizziness and nausea with headaches  Symptoms began approx 2 months ago  Denies history of migraines  Hematological: Negative for adenopathy  Objective:     Physical Exam   Constitutional: She is oriented to person, place, and time  She appears well-developed and well-nourished  HENT:   Right Ear: External ear normal    Left Ear: External ear normal    Nose: Nose normal    Mouth/Throat: Oropharynx is clear and moist  No oropharyngeal exudate  Pulmonary/Chest: Effort normal  She has no wheezes  She has no rales  Tight, moist cough noted  No wheezing on exam    Musculoskeletal: Normal range of motion  She exhibits no edema  Full ROM in bilateral lower extremities  No back tenderness noted on exam with full ROM  Neurological: She is alert and oriented to person, place, and time  Skin: Skin is warm and dry  No rash noted  Psychiatric: She has a normal mood and affect

## 2018-02-08 NOTE — ASSESSMENT & PLAN NOTE
Will begin azithromycin  Begin ventolin inhaler for chest tightness  Use OTC mucinex DM  Increase fluids and rest   Follow up if symptoms not improving or worsening

## 2018-04-05 DIAGNOSIS — I25.118 CORONARY ARTERY DISEASE INVOLVING NATIVE HEART WITH OTHER FORM OF ANGINA PECTORIS, UNSPECIFIED VESSEL OR LESION TYPE (HCC): Primary | ICD-10-CM

## 2018-04-05 RX ORDER — LORATADINE 10 MG/1
TABLET ORAL
Qty: 30 TABLET | Refills: 3 | OUTPATIENT
Start: 2018-04-05

## 2018-04-05 RX ORDER — RANOLAZINE 500 MG/1
TABLET, FILM COATED, EXTENDED RELEASE ORAL
Qty: 180 TABLET | Refills: 3 | Status: SHIPPED | OUTPATIENT
Start: 2018-04-05 | End: 2018-12-18 | Stop reason: SDUPTHER

## 2018-04-05 NOTE — TELEPHONE ENCOUNTER
Please review request for medication refill  I am no longer associated with the [facility] and no longer the patient's PCP

## 2018-04-16 DIAGNOSIS — E78.5 HYPERLIPIDEMIA, UNSPECIFIED HYPERLIPIDEMIA TYPE: Primary | ICD-10-CM

## 2018-04-16 RX ORDER — APIXABAN 5 MG/1
TABLET, FILM COATED ORAL
Qty: 60 TABLET | Refills: 3 | OUTPATIENT
Start: 2018-04-16

## 2018-04-16 NOTE — TELEPHONE ENCOUNTER
Please review request for medication refill  I am no longer associated with the [facility] and no longer the patient's PCP

## 2018-04-17 RX ORDER — ATORVASTATIN CALCIUM 80 MG/1
80 TABLET, FILM COATED ORAL DAILY
Qty: 90 TABLET | Refills: 2 | Status: SHIPPED | OUTPATIENT
Start: 2018-04-17 | End: 2018-04-20 | Stop reason: SDUPTHER

## 2018-04-19 PROBLEM — J06.9 ACUTE UPPER RESPIRATORY INFECTION: Status: RESOLVED | Noted: 2018-02-08 | Resolved: 2018-04-19

## 2018-04-20 ENCOUNTER — OFFICE VISIT (OUTPATIENT)
Dept: FAMILY MEDICINE CLINIC | Facility: CLINIC | Age: 63
End: 2018-04-20
Payer: COMMERCIAL

## 2018-04-20 VITALS
RESPIRATION RATE: 18 BRPM | HEART RATE: 68 BPM | SYSTOLIC BLOOD PRESSURE: 128 MMHG | HEIGHT: 62 IN | DIASTOLIC BLOOD PRESSURE: 72 MMHG | OXYGEN SATURATION: 97 % | TEMPERATURE: 97.8 F | BODY MASS INDEX: 34.1 KG/M2 | WEIGHT: 185.31 LBS

## 2018-04-20 DIAGNOSIS — E78.5 HYPERLIPIDEMIA, UNSPECIFIED HYPERLIPIDEMIA TYPE: ICD-10-CM

## 2018-04-20 DIAGNOSIS — I20.9 ANGINA, CLASS III (HCC): ICD-10-CM

## 2018-04-20 DIAGNOSIS — E78.5 DYSLIPIDEMIA: ICD-10-CM

## 2018-04-20 DIAGNOSIS — I82.409 RECURRENT DEEP VENOUS THROMBOSIS (HCC): ICD-10-CM

## 2018-04-20 DIAGNOSIS — G25.81 RESTLESS LEG SYNDROME: ICD-10-CM

## 2018-04-20 DIAGNOSIS — I10 BENIGN ESSENTIAL HYPERTENSION: Primary | ICD-10-CM

## 2018-04-20 DIAGNOSIS — J30.1 ALLERGIC RHINITIS DUE TO POLLEN, UNSPECIFIED SEASONALITY: ICD-10-CM

## 2018-04-20 DIAGNOSIS — K21.9 GASTROESOPHAGEAL REFLUX DISEASE, ESOPHAGITIS PRESENCE NOT SPECIFIED: ICD-10-CM

## 2018-04-20 PROBLEM — R73.03 PREDIABETES: Status: RESOLVED | Noted: 2017-08-26 | Resolved: 2018-04-20

## 2018-04-20 PROCEDURE — 99214 OFFICE O/P EST MOD 30 MIN: CPT | Performed by: PHYSICIAN ASSISTANT

## 2018-04-20 PROCEDURE — 3078F DIAST BP <80 MM HG: CPT | Performed by: PHYSICIAN ASSISTANT

## 2018-04-20 PROCEDURE — 3074F SYST BP LT 130 MM HG: CPT | Performed by: PHYSICIAN ASSISTANT

## 2018-04-20 PROCEDURE — 3725F SCREEN DEPRESSION PERFORMED: CPT | Performed by: PHYSICIAN ASSISTANT

## 2018-04-20 PROCEDURE — 3008F BODY MASS INDEX DOCD: CPT | Performed by: PHYSICIAN ASSISTANT

## 2018-04-20 RX ORDER — OMEPRAZOLE 40 MG/1
40 CAPSULE, DELAYED RELEASE ORAL DAILY
Qty: 90 CAPSULE | Refills: 1 | Status: SHIPPED | OUTPATIENT
Start: 2018-04-20 | End: 2018-11-10 | Stop reason: SDUPTHER

## 2018-04-20 RX ORDER — LOSARTAN POTASSIUM 100 MG/1
100 TABLET ORAL DAILY
Qty: 90 TABLET | Refills: 1 | Status: SHIPPED | OUTPATIENT
Start: 2018-04-20 | End: 2018-12-10 | Stop reason: SDUPTHER

## 2018-04-20 RX ORDER — ATORVASTATIN CALCIUM 80 MG/1
80 TABLET, FILM COATED ORAL DAILY
Qty: 90 TABLET | Refills: 1 | Status: SHIPPED | OUTPATIENT
Start: 2018-04-20 | End: 2018-12-10 | Stop reason: SDUPTHER

## 2018-04-20 RX ORDER — LORATADINE 10 MG/1
10 TABLET ORAL DAILY
Qty: 90 TABLET | Refills: 1 | Status: SHIPPED | OUTPATIENT
Start: 2018-04-20 | End: 2019-02-04 | Stop reason: SDUPTHER

## 2018-04-20 RX ORDER — METOPROLOL TARTRATE 50 MG/1
TABLET, FILM COATED ORAL
Qty: 270 TABLET | Refills: 1 | Status: SHIPPED | OUTPATIENT
Start: 2018-04-20 | End: 2018-11-10 | Stop reason: SDUPTHER

## 2018-04-20 RX ORDER — ROPINIROLE 0.5 MG/1
0.5 TABLET, FILM COATED ORAL
Qty: 90 TABLET | Refills: 1 | Status: SHIPPED | OUTPATIENT
Start: 2018-04-20 | End: 2018-12-18

## 2018-04-20 NOTE — PATIENT INSTRUCTIONS
Continue follow-up with Cardiology as scheduled  90 day supply of medications with 1 refill have been sent to the pharmacy except for Ranexa which she states is usually filled by Cardiology  Proceed with fasting blood work    Routine follow-up in 6 months after she returns from [facility]

## 2018-04-20 NOTE — PROGRESS NOTES
Assessment/Plan:    Patient Instructions   Continue follow-up with Cardiology as scheduled  90 day supply of medications with 1 refill have been sent to the pharmacy except for Ranexa which she states is usually filled by Cardiology  Proceed with fasting blood work  Routine follow-up in 6 months after she returns from [facility] was used to dictate this note  It may contain errors with dictating incorrect words/spelling  Please contact provider directly for any questions  Diagnoses and all orders for this visit:    Benign essential hypertension  -     CBC and differential  -     Comprehensive metabolic panel  -     Lipid panel    Dyslipidemia  -     CBC and differential  -     Comprehensive metabolic panel  -     Lipid panel    Recurrent deep venous thrombosis (HCC)  -     CBC and differential  -     Comprehensive metabolic panel  -     Lipid panel    Angina, class III (HCC)  -     CBC and differential  -     Comprehensive metabolic panel  -     Lipid panel    Restless leg syndrome  -     CBC and differential  -     Comprehensive metabolic panel  -     Lipid panel    Allergic rhinitis due to pollen, unspecified seasonality  -     CBC and differential  -     Comprehensive metabolic panel  -     Lipid panel    Other orders  -     aluminum-magnesium hydroxide 200-200 MG/5ML suspension; Take 5 mL by mouth every 6 (six) hours as needed for heartburn  -     apixaban (ELIQUIS) 5 mg;           Subjective:      Patient ID: Mayra Oliveros is a 58 y o  female  Patient presents today with her daughter who helps with translation [place]  She comes for follow-up of hyperlipidemia, hypertension, reflux, recurrent DVT, restless leg, coronary artery disease for which she follows with Cardiology  She states that her leg discomfort has improved since she started the Requip recently at bedtime  She is compliant with her medications otherwise    She recently saw Cardiology and is not due again until the end of the year  She denies any chest pain, shortness of breath or swelling of her lower extremities  Her daughter states that she does plan on going to [facility] in approximately 1 month and will be there for the summer until fall  The following portions of the patient's history were reviewed and updated as appropriate:   She  has a past medical history of Coronary artery disease; History of angioplasty; History of cardiac cath (2003); Hyperlipidemia; and Hypertension  She   Patient Active Problem List    Diagnosis Date Noted    Recurrent deep venous thrombosis ([facility] ) 04/20/2018    Restless leg syndrome 02/08/2018    Allergic rhinitis 09/30/2016    Microscopic hematuria 09/13/2016    Atrophic vaginitis 08/23/2016    Uterovaginal prolapse, incomplete 08/23/2016    Fatigue 06/29/2016    Edema, lower extremity 08/04/2015    Glaucoma, open angle 05/28/2015    Hearing difficulty 05/28/2015    CAD S/P percutaneous coronary angioplasty 05/26/2015    [facility], [facility] ) 05/26/2015    Coronary artery disease 05/19/2015    Benign essential hypertension 05/19/2015    Acid reflux disease 05/19/2015    Dyslipidemia 05/19/2015    Osteoarthritis of knee 05/19/2015    Scoliosis 05/19/2015     She  has a past surgical history that includes Cardiac surgery (2000); Cholecystectomy; EAR SURGERY (1984); and pr colonoscopy flx dx w/collj spec when pfrmd (N/A, 6/22/2017)  Her family history is not on file  She  reports that she has quit smoking  She has never used smokeless tobacco  She reports that she does not drink alcohol or use drugs    Current Outpatient Prescriptions   Medication Sig Dispense Refill    aluminum-magnesium hydroxide 200-200 MG/5ML suspension Take 5 mL by mouth every 6 (six) hours as needed for heartburn      apixaban (ELIQUIS) 5 mg       acetaminophen (TYLENOL) 500 mg tablet Take 500 mg by mouth every 6 (six) hours as needed for mild pain      apixaban (ELIQUIS) 5 mg Take 1 tablet by mouth every 12 (twelve) hours      aspirin 81 MG tablet Take 1 tablet by mouth daily      atorvastatin (LIPITOR) 80 mg tablet Take 1 tablet (80 mg total) by mouth daily 90 tablet 2    conjugated estrogens (PREMARIN) vaginal cream Apply a pea size amount of the estrogen cream to the opening of the vagina three nights per week   isosorbide mononitrate (IMDUR) 30 mg 24 hr tablet Take 1 tablet by mouth daily      latanoprost (XALATAN) 0 005 % ophthalmic solution 1 drop daily at bedtime      loratadine (CLARITIN) 10 mg tablet TAKE 1 TABLET EVERY MORNING AS NEEDED  3    losartan (COZAAR) 100 MG tablet Take 100 mg by mouth daily      metoprolol tartrate (LOPRESSOR) 50 mg tablet Take 50 mg by mouth 2 (two) times a day      nitroglycerin (NITROSTAT) 0 4 mg SL tablet Place 0 4 mg under the tongue every 5 (five) minutes as needed for chest pain      omeprazole (PriLOSEC) 40 MG capsule Take 40 mg by mouth daily      RANEXA 500 MG 12 hr tablet TAKE 1 TABLET TWICE A  tablet 3    rOPINIRole (REQUIP) 0 5 mg tablet Take 1 tablet (0 5 mg total) by mouth daily at bedtime 30 tablet 3     No current facility-administered medications for this visit  Current Outpatient Prescriptions on File Prior to Visit   Medication Sig    acetaminophen (TYLENOL) 500 mg tablet Take 500 mg by mouth every 6 (six) hours as needed for mild pain    apixaban (ELIQUIS) 5 mg Take 1 tablet by mouth every 12 (twelve) hours    aspirin 81 MG tablet Take 1 tablet by mouth daily    atorvastatin (LIPITOR) 80 mg tablet Take 1 tablet (80 mg total) by mouth daily    conjugated estrogens (PREMARIN) vaginal cream Apply a pea size amount of the estrogen cream to the opening of the vagina three nights per week      isosorbide mononitrate (IMDUR) 30 mg 24 hr tablet Take 1 tablet by mouth daily    latanoprost (XALATAN) 0 005 % ophthalmic solution 1 drop daily at bedtime    loratadine (CLARITIN) 10 mg tablet TAKE 1 TABLET EVERY MORNING AS NEEDED   losartan (COZAAR) 100 MG tablet Take 100 mg by mouth daily    metoprolol tartrate (LOPRESSOR) 50 mg tablet Take 50 mg by mouth 2 (two) times a day    nitroglycerin (NITROSTAT) 0 4 mg SL tablet Place 0 4 mg under the tongue every 5 (five) minutes as needed for chest pain    omeprazole (PriLOSEC) 40 MG capsule Take 40 mg by mouth daily    RANEXA 500 MG 12 hr tablet TAKE 1 TABLET TWICE A DAY    rOPINIRole (REQUIP) 0 5 mg tablet Take 1 tablet (0 5 mg total) by mouth daily at bedtime    [DISCONTINUED] albuterol (VENTOLIN HFA) 90 mcg/act inhaler Inhale 2 puffs every 6 (six) hours as needed for wheezing    [DISCONTINUED] aluminum hydroxide [facility]) Apply topically as needed for wound care    [DISCONTINUED] isosorbide dinitrate (ISORDIL) 30 mg tablet Take 30 mg by mouth 4 (four) times a day    [DISCONTINUED] loratadine-pseudoephedrine (CLARITIN-D 24-HOUR)  mg per 24 hr tablet Take 1 tablet by mouth daily     No current facility-administered medications on file prior to visit  She is allergic to ibuprofen       Review of Systems   Constitutional: Negative  HENT: Negative  Respiratory: Negative  Cardiovascular: Negative  Gastrointestinal: Negative  Objective:      /72 (BP Location: Left arm, Patient Position: Sitting, Cuff Size: Large)   Pulse 68   Temp 97 8 °F (36 6 °C) (Tympanic)   Resp 18   Ht 5' 2" (1 575 m)   Wt 84 1 kg (185 lb 5 oz)   SpO2 97%   BMI 33 89 kg/m²          Physical Exam   Constitutional: She is oriented to person, place, and time  She appears well-developed and well-nourished  HENT:   Head: Normocephalic and atraumatic  Right Ear: External ear normal    Left Ear: External ear normal    Mouth/Throat: Oropharynx is clear and moist    Neck: Neck supple  No thyromegaly present  Cardiovascular: Normal rate, regular rhythm and normal heart sounds  Exam reveals no gallop and no friction rub  No murmur heard    Pulmonary/Chest: Effort normal and breath sounds normal    Abdominal: Soft  Bowel sounds are normal  She exhibits no mass  There is no tenderness  Musculoskeletal: She exhibits no edema  Lymphadenopathy:     She has no cervical adenopathy  Neurological: She is alert and oriented to person, place, and time  Skin: Skin is warm  Psychiatric: She has a normal mood and affect

## 2018-05-07 ENCOUNTER — APPOINTMENT (OUTPATIENT)
Dept: LAB | Facility: CLINIC | Age: 63
End: 2018-05-07
Payer: COMMERCIAL

## 2018-05-07 LAB
ALBUMIN SERPL BCP-MCNC: 3.9 G/DL (ref 3.5–5)
ALP SERPL-CCNC: 73 U/L (ref 46–116)
ALT SERPL W P-5'-P-CCNC: 24 U/L (ref 12–78)
ANION GAP SERPL CALCULATED.3IONS-SCNC: 7 MMOL/L (ref 4–13)
AST SERPL W P-5'-P-CCNC: 22 U/L (ref 5–45)
BASOPHILS # BLD AUTO: 0.03 THOUSANDS/ΜL (ref 0–0.1)
BASOPHILS NFR BLD AUTO: 1 % (ref 0–1)
BILIRUB SERPL-MCNC: 0.84 MG/DL (ref 0.2–1)
BUN SERPL-MCNC: 8 MG/DL (ref 5–25)
CALCIUM SERPL-MCNC: 9 MG/DL (ref 8.3–10.1)
CHLORIDE SERPL-SCNC: 108 MMOL/L (ref 100–108)
CHOLEST SERPL-MCNC: 160 MG/DL (ref 50–200)
CO2 SERPL-SCNC: 26 MMOL/L (ref 21–32)
CREAT SERPL-MCNC: 0.7 MG/DL (ref 0.6–1.3)
EOSINOPHIL # BLD AUTO: 0.13 THOUSAND/ΜL (ref 0–0.61)
EOSINOPHIL NFR BLD AUTO: 2 % (ref 0–6)
ERYTHROCYTE [DISTWIDTH] IN BLOOD BY AUTOMATED COUNT: 13.3 % (ref 11.6–15.1)
GFR SERPL CREATININE-BSD FRML MDRD: 92 ML/MIN/1.73SQ M
GLUCOSE P FAST SERPL-MCNC: 91 MG/DL (ref 65–99)
HCT VFR BLD AUTO: 42.5 % (ref 34.8–46.1)
HDLC SERPL-MCNC: 55 MG/DL (ref 40–60)
HGB BLD-MCNC: 13.7 G/DL (ref 11.5–15.4)
LDLC SERPL CALC-MCNC: 76 MG/DL (ref 0–100)
LYMPHOCYTES # BLD AUTO: 2.37 THOUSANDS/ΜL (ref 0.6–4.47)
LYMPHOCYTES NFR BLD AUTO: 37 % (ref 14–44)
MCH RBC QN AUTO: 28.3 PG (ref 26.8–34.3)
MCHC RBC AUTO-ENTMCNC: 32.2 G/DL (ref 31.4–37.4)
MCV RBC AUTO: 88 FL (ref 82–98)
MONOCYTES # BLD AUTO: 0.71 THOUSAND/ΜL (ref 0.17–1.22)
MONOCYTES NFR BLD AUTO: 11 % (ref 4–12)
NEUTROPHILS # BLD AUTO: 3.08 THOUSANDS/ΜL (ref 1.85–7.62)
NEUTS SEG NFR BLD AUTO: 49 % (ref 43–75)
NONHDLC SERPL-MCNC: 105 MG/DL
NRBC BLD AUTO-RTO: 0 /100 WBCS
PLATELET # BLD AUTO: 153 THOUSANDS/UL (ref 149–390)
PMV BLD AUTO: 12.6 FL (ref 8.9–12.7)
POTASSIUM SERPL-SCNC: 3.8 MMOL/L (ref 3.5–5.3)
PROT SERPL-MCNC: 6.9 G/DL (ref 6.4–8.2)
RBC # BLD AUTO: 4.84 MILLION/UL (ref 3.81–5.12)
SODIUM SERPL-SCNC: 141 MMOL/L (ref 136–145)
TRIGL SERPL-MCNC: 146 MG/DL
WBC # BLD AUTO: 6.34 THOUSAND/UL (ref 4.31–10.16)

## 2018-05-07 PROCEDURE — 80061 LIPID PANEL: CPT | Performed by: PHYSICIAN ASSISTANT

## 2018-05-07 PROCEDURE — 85025 COMPLETE CBC W/AUTO DIFF WBC: CPT | Performed by: PHYSICIAN ASSISTANT

## 2018-05-07 PROCEDURE — 36415 COLL VENOUS BLD VENIPUNCTURE: CPT | Performed by: PHYSICIAN ASSISTANT

## 2018-05-07 PROCEDURE — 80053 COMPREHEN METABOLIC PANEL: CPT | Performed by: PHYSICIAN ASSISTANT

## 2018-10-06 DIAGNOSIS — I82.409 RECURRENT DEEP VENOUS THROMBOSIS (HCC): ICD-10-CM

## 2018-10-06 DIAGNOSIS — J30.1 ALLERGIC RHINITIS DUE TO POLLEN, UNSPECIFIED SEASONALITY: ICD-10-CM

## 2018-10-06 DIAGNOSIS — E78.5 HYPERLIPIDEMIA, UNSPECIFIED HYPERLIPIDEMIA TYPE: ICD-10-CM

## 2018-10-08 RX ORDER — LORATADINE 10 MG/1
10 TABLET ORAL DAILY
Qty: 90 TABLET | Refills: 1 | OUTPATIENT
Start: 2018-10-08

## 2018-10-08 RX ORDER — ATORVASTATIN CALCIUM 80 MG/1
80 TABLET, FILM COATED ORAL DAILY
Qty: 90 TABLET | Refills: 1 | OUTPATIENT
Start: 2018-10-08

## 2018-10-08 RX ORDER — APIXABAN 5 MG/1
5 TABLET, FILM COATED ORAL EVERY 12 HOURS
Qty: 180 TABLET | Refills: 1 | OUTPATIENT
Start: 2018-10-08

## 2018-11-10 DIAGNOSIS — I10 BENIGN ESSENTIAL HYPERTENSION: ICD-10-CM

## 2018-11-10 DIAGNOSIS — K21.9 GASTROESOPHAGEAL REFLUX DISEASE, ESOPHAGITIS PRESENCE NOT SPECIFIED: ICD-10-CM

## 2018-11-10 DIAGNOSIS — I20.9 ANGINA, CLASS III (HCC): ICD-10-CM

## 2018-11-12 DIAGNOSIS — I20.9 ANGINA, CLASS III (HCC): ICD-10-CM

## 2018-11-12 DIAGNOSIS — I25.10 CAD S/P PERCUTANEOUS CORONARY ANGIOPLASTY: ICD-10-CM

## 2018-11-12 DIAGNOSIS — Z98.61 CAD S/P PERCUTANEOUS CORONARY ANGIOPLASTY: ICD-10-CM

## 2018-11-12 DIAGNOSIS — E78.5 DYSLIPIDEMIA: ICD-10-CM

## 2018-11-12 DIAGNOSIS — R60.0 EDEMA, LOWER EXTREMITY: ICD-10-CM

## 2018-11-12 DIAGNOSIS — I10 BENIGN ESSENTIAL HYPERTENSION: Primary | ICD-10-CM

## 2018-11-12 NOTE — TELEPHONE ENCOUNTER
Patient is due for follow-up for hypertension, reflux, hyperlipidemia    There are orders in the system for fasting blood work and a urine test   Please let me know if she needs a temporary refill on medications

## 2018-11-13 RX ORDER — METOPROLOL TARTRATE 50 MG/1
TABLET, FILM COATED ORAL
Qty: 90 TABLET | Refills: 0 | Status: SHIPPED | OUTPATIENT
Start: 2018-11-13 | End: 2018-12-10 | Stop reason: SDUPTHER

## 2018-11-13 RX ORDER — OMEPRAZOLE 40 MG/1
CAPSULE, DELAYED RELEASE ORAL
Qty: 30 CAPSULE | Refills: 0 | Status: SHIPPED | OUTPATIENT
Start: 2018-11-13 | End: 2018-12-12 | Stop reason: SDUPTHER

## 2018-11-21 ENCOUNTER — APPOINTMENT (OUTPATIENT)
Dept: LAB | Facility: CLINIC | Age: 63
End: 2018-11-21
Payer: COMMERCIAL

## 2018-11-21 ENCOUNTER — TRANSCRIBE ORDERS (OUTPATIENT)
Dept: LAB | Facility: CLINIC | Age: 63
End: 2018-11-21

## 2018-11-21 DIAGNOSIS — I20.9 ANGINA, CLASS III (HCC): ICD-10-CM

## 2018-11-21 DIAGNOSIS — R31.29 MICROSCOPIC HEMATURIA: Primary | ICD-10-CM

## 2018-11-21 DIAGNOSIS — E78.5 DYSLIPIDEMIA: ICD-10-CM

## 2018-11-21 DIAGNOSIS — R60.0 EDEMA, LOWER EXTREMITY: ICD-10-CM

## 2018-11-21 DIAGNOSIS — Z98.61 CAD S/P PERCUTANEOUS CORONARY ANGIOPLASTY: ICD-10-CM

## 2018-11-21 DIAGNOSIS — I10 BENIGN ESSENTIAL HYPERTENSION: ICD-10-CM

## 2018-11-21 DIAGNOSIS — I25.10 CAD S/P PERCUTANEOUS CORONARY ANGIOPLASTY: ICD-10-CM

## 2018-11-21 LAB
ALBUMIN SERPL BCP-MCNC: 3.8 G/DL (ref 3.5–5)
ALP SERPL-CCNC: 82 U/L (ref 46–116)
ALT SERPL W P-5'-P-CCNC: 29 U/L (ref 12–78)
ANION GAP SERPL CALCULATED.3IONS-SCNC: 5 MMOL/L (ref 4–13)
AST SERPL W P-5'-P-CCNC: 18 U/L (ref 5–45)
BACTERIA UR QL AUTO: ABNORMAL /HPF
BILIRUB SERPL-MCNC: 0.52 MG/DL (ref 0.2–1)
BILIRUB UR QL STRIP: NEGATIVE
BUN SERPL-MCNC: 12 MG/DL (ref 5–25)
CALCIUM SERPL-MCNC: 8.9 MG/DL (ref 8.3–10.1)
CHLORIDE SERPL-SCNC: 108 MMOL/L (ref 100–108)
CHOLEST SERPL-MCNC: 190 MG/DL (ref 50–200)
CLARITY UR: CLEAR
CO2 SERPL-SCNC: 29 MMOL/L (ref 21–32)
COLOR UR: YELLOW
CREAT SERPL-MCNC: 0.75 MG/DL (ref 0.6–1.3)
CREAT UR-MCNC: 107 MG/DL
GFR SERPL CREATININE-BSD FRML MDRD: 85 ML/MIN/1.73SQ M
GLUCOSE P FAST SERPL-MCNC: 94 MG/DL (ref 65–99)
GLUCOSE UR STRIP-MCNC: NEGATIVE MG/DL
HDLC SERPL-MCNC: 51 MG/DL (ref 40–60)
HGB UR QL STRIP.AUTO: NEGATIVE
HYALINE CASTS #/AREA URNS LPF: ABNORMAL /LPF
KETONES UR STRIP-MCNC: NEGATIVE MG/DL
LDLC SERPL CALC-MCNC: 95 MG/DL (ref 0–100)
LEUKOCYTE ESTERASE UR QL STRIP: ABNORMAL
MICROALBUMIN UR-MCNC: 12.5 MG/L (ref 0–20)
MICROALBUMIN/CREAT 24H UR: 12 MG/G CREATININE (ref 0–30)
NITRITE UR QL STRIP: NEGATIVE
NON-SQ EPI CELLS URNS QL MICRO: ABNORMAL /HPF
NONHDLC SERPL-MCNC: 139 MG/DL
PH UR STRIP.AUTO: 6.5 [PH] (ref 4.5–8)
POTASSIUM SERPL-SCNC: 4.4 MMOL/L (ref 3.5–5.3)
PROT SERPL-MCNC: 6.9 G/DL (ref 6.4–8.2)
PROT UR STRIP-MCNC: NEGATIVE MG/DL
RBC #/AREA URNS AUTO: ABNORMAL /HPF
SODIUM SERPL-SCNC: 142 MMOL/L (ref 136–145)
SP GR UR STRIP.AUTO: 1.02 (ref 1–1.03)
TRIGL SERPL-MCNC: 220 MG/DL
UROBILINOGEN UR QL STRIP.AUTO: 0.2 E.U./DL
WBC #/AREA URNS AUTO: ABNORMAL /HPF

## 2018-11-21 PROCEDURE — 80053 COMPREHEN METABOLIC PANEL: CPT | Performed by: PHYSICIAN ASSISTANT

## 2018-11-21 PROCEDURE — 81001 URINALYSIS AUTO W/SCOPE: CPT

## 2018-11-21 PROCEDURE — 36415 COLL VENOUS BLD VENIPUNCTURE: CPT | Performed by: PHYSICIAN ASSISTANT

## 2018-11-21 PROCEDURE — 82570 ASSAY OF URINE CREATININE: CPT

## 2018-11-21 PROCEDURE — 82043 UR ALBUMIN QUANTITATIVE: CPT

## 2018-11-21 PROCEDURE — 80061 LIPID PANEL: CPT | Performed by: PHYSICIAN ASSISTANT

## 2018-12-10 ENCOUNTER — OFFICE VISIT (OUTPATIENT)
Dept: FAMILY MEDICINE CLINIC | Facility: CLINIC | Age: 63
End: 2018-12-10
Payer: COMMERCIAL

## 2018-12-10 ENCOUNTER — HOSPITAL ENCOUNTER (OUTPATIENT)
Dept: RADIOLOGY | Facility: HOSPITAL | Age: 63
Discharge: HOME | End: 2018-12-10
Payer: COMMERCIAL

## 2018-12-10 VITALS
WEIGHT: 179.56 LBS | HEART RATE: 58 BPM | BODY MASS INDEX: 33.04 KG/M2 | OXYGEN SATURATION: 98 % | DIASTOLIC BLOOD PRESSURE: 80 MMHG | TEMPERATURE: 95.2 F | RESPIRATION RATE: 18 BRPM | HEIGHT: 62 IN | SYSTOLIC BLOOD PRESSURE: 122 MMHG

## 2018-12-10 DIAGNOSIS — M25.552 PAIN OF BOTH HIP JOINTS: ICD-10-CM

## 2018-12-10 DIAGNOSIS — I82.409 RECURRENT DEEP VENOUS THROMBOSIS (HCC): ICD-10-CM

## 2018-12-10 DIAGNOSIS — M25.551 PAIN OF BOTH HIP JOINTS: ICD-10-CM

## 2018-12-10 DIAGNOSIS — M25.562 ACUTE PAIN OF LEFT KNEE: ICD-10-CM

## 2018-12-10 DIAGNOSIS — I20.9 ANGINA, CLASS III (HCC): ICD-10-CM

## 2018-12-10 DIAGNOSIS — E78.5 DYSLIPIDEMIA: ICD-10-CM

## 2018-12-10 DIAGNOSIS — I10 BENIGN ESSENTIAL HYPERTENSION: Primary | ICD-10-CM

## 2018-12-10 DIAGNOSIS — E78.5 HYPERLIPIDEMIA, UNSPECIFIED HYPERLIPIDEMIA TYPE: ICD-10-CM

## 2018-12-10 PROCEDURE — 1036F TOBACCO NON-USER: CPT | Performed by: FAMILY MEDICINE

## 2018-12-10 PROCEDURE — 3079F DIAST BP 80-89 MM HG: CPT | Performed by: FAMILY MEDICINE

## 2018-12-10 PROCEDURE — 73521 X-RAY EXAM HIPS BI 2 VIEWS: CPT

## 2018-12-10 PROCEDURE — 99214 OFFICE O/P EST MOD 30 MIN: CPT | Performed by: FAMILY MEDICINE

## 2018-12-10 PROCEDURE — 3008F BODY MASS INDEX DOCD: CPT | Performed by: FAMILY MEDICINE

## 2018-12-10 PROCEDURE — 3074F SYST BP LT 130 MM HG: CPT | Performed by: FAMILY MEDICINE

## 2018-12-10 RX ORDER — ISOSORBIDE MONONITRATE 30 MG/1
30 TABLET, EXTENDED RELEASE ORAL DAILY
Qty: 90 TABLET | Refills: 1 | Status: SHIPPED | OUTPATIENT
Start: 2018-12-10 | End: 2018-12-12 | Stop reason: SDUPTHER

## 2018-12-10 RX ORDER — NITROGLYCERIN 0.4 MG/1
0.4 TABLET SUBLINGUAL
Qty: 90 TABLET | Refills: 0 | Status: SHIPPED | OUTPATIENT
Start: 2018-12-10 | End: 2018-12-12 | Stop reason: SDUPTHER

## 2018-12-10 RX ORDER — METOPROLOL TARTRATE 50 MG/1
50 TABLET, FILM COATED ORAL EVERY 12 HOURS SCHEDULED
Qty: 180 TABLET | Refills: 1 | Status: SHIPPED | OUTPATIENT
Start: 2018-12-10 | End: 2018-12-12 | Stop reason: SDUPTHER

## 2018-12-10 RX ORDER — LOSARTAN POTASSIUM 100 MG/1
100 TABLET ORAL DAILY
Qty: 90 TABLET | Refills: 1 | Status: SHIPPED | OUTPATIENT
Start: 2018-12-10 | End: 2018-12-12 | Stop reason: SDUPTHER

## 2018-12-10 RX ORDER — MELOXICAM 7.5 MG/1
7.5 TABLET ORAL DAILY
Qty: 90 TABLET | Refills: 0 | Status: SHIPPED | OUTPATIENT
Start: 2018-12-10 | End: 2018-12-12 | Stop reason: SDUPTHER

## 2018-12-10 RX ORDER — ATORVASTATIN CALCIUM 80 MG/1
80 TABLET, FILM COATED ORAL DAILY
Qty: 90 TABLET | Refills: 0 | Status: SHIPPED | OUTPATIENT
Start: 2018-12-10 | End: 2018-12-12 | Stop reason: SDUPTHER

## 2018-12-10 NOTE — PROGRESS NOTES
Assessment/Plan:    Pain of both hip joints  X ray  Meloxicam  Physical therapy     Acute pain of left knee  Ice   Elevate  OTC knee sleeve   Physical therapy          Problem List Items Addressed This Visit        Cardiovascular and Mediastinum    Benign essential hypertension - Primary    Relevant Medications    metoprolol tartrate (LOPRESSOR) 50 mg tablet    losartan (COZAAR) 100 MG tablet    Angina, class III (HCC)    Relevant Medications    metoprolol tartrate (LOPRESSOR) 50 mg tablet    isosorbide mononitrate (IMDUR) 30 mg 24 hr tablet    nitroglycerin (NITROSTAT) 0 4 mg SL tablet    Recurrent deep venous thrombosis (HCC)    Relevant Medications    apixaban (ELIQUIS) 5 mg       Other    Dyslipidemia    Pain of both hip joints     X ray  Meloxicam  Physical therapy          Relevant Medications    meloxicam (MOBIC) 7 5 mg tablet    Other Relevant Orders    Ambulatory referral to Physical Therapy    XR hip/pelv 2-3 vws right if performed    Acute pain of left knee     Ice   Elevate  OTC knee sleeve   Physical therapy          Relevant Medications    meloxicam (MOBIC) 7 5 mg tablet    Other Relevant Orders    Ambulatory referral to Physical Therapy    Hyperlipidemia    Relevant Medications    atorvastatin (LIPITOR) 80 mg tablet            Subjective:      Patient ID: Jassi Tijerina is a 58 y o  female  57 yo  female with controlled HTN and Hyperlipidemia, here today complaining of bilateral hip pain  Pain pain is worse as the day goes on  Not radiated  Constant 6/10  No history of trauma, has tried APAP without any improvement   Patient also states than 3 months ago while in [facility] she fell and hit her knee  She has been having knee pain since  Pain is only when she kneels or when she bumps her knee, otherwise no pain  Back Pain   This is a new problem  The current episode started more than 1 month ago  The problem occurs constantly  The problem is unchanged   The pain is present in the lumbar spine  The quality of the pain is described as aching and cramping  The pain does not radiate  The pain is at a severity of 6/10  The pain is worse during the day  The symptoms are aggravated by bending, standing, twisting and stress  Stiffness is present all day  Associated symptoms include paresthesias, tingling and weakness  Pertinent negatives include no bladder incontinence, bowel incontinence, leg pain, numbness or pelvic pain  Risk factors include history of cancer, obesity, menopause, lack of exercise, poor posture and sedentary lifestyle  She has tried analgesics for the symptoms  The treatment provided no relief  The following portions of the patient's history were reviewed and updated as appropriate:   She  has a past medical history of Coronary artery disease; Depression; History of angioplasty; History of cardiac cath (2003); Hyperlipidemia; and Hypertension  She   Patient Active Problem List    Diagnosis Date Noted    Pain of both hip joints 12/10/2018    Acute pain of left knee 12/10/2018    Hyperlipidemia 12/10/2018    Recurrent deep venous thrombosis ([facility] ) 04/20/2018    Restless leg syndrome 02/08/2018    Allergic rhinitis 09/30/2016    Microscopic hematuria 09/13/2016    Atrophic vaginitis 08/23/2016    Uterovaginal prolapse, incomplete 08/23/2016    Fatigue 06/29/2016    Edema, lower extremity 08/04/2015    Glaucoma, open angle 05/28/2015    Hearing difficulty 05/28/2015    CAD S/P percutaneous coronary angioplasty 05/26/2015    [facility], class III [facility] ) 05/26/2015    Coronary artery disease 05/19/2015    Benign essential hypertension 05/19/2015    Acid reflux disease 05/19/2015    Dyslipidemia 05/19/2015    Osteoarthritis of knee 05/19/2015    Scoliosis 05/19/2015     She  has a past surgical history that includes Cardiac surgery (2000);  Cholecystectomy; EAR SURGERY (1984); pr colonoscopy flx dx w/collj spec when pfrmd (N/A, 6/22/2017); and Coronary angioplasty with stent  Her family history includes Diabetes in her mother; Heart attack in her mother; Heart disease in her family; Hypertension in her family; Hypothyroidism in her sister; Stroke in her father  She  reports that she has quit smoking  She has never used smokeless tobacco  She reports that she does not drink alcohol or use drugs  Current Outpatient Prescriptions   Medication Sig Dispense Refill    acetaminophen (TYLENOL) 500 mg tablet Take 500 mg by mouth every 6 (six) hours as needed for mild pain      aluminum-magnesium hydroxide 200-200 MG/5ML suspension Take 5 mL by mouth every 6 (six) hours as needed for heartburn      apixaban (ELIQUIS) 5 mg Take 1 tablet (5 mg total) by mouth every 12 (twelve) hours 180 tablet 0    aspirin 81 MG tablet Take 1 tablet by mouth daily      atorvastatin (LIPITOR) 80 mg tablet Take 1 tablet (80 mg total) by mouth daily 90 tablet 0    conjugated estrogens (PREMARIN) vaginal cream Apply a pea size amount of the estrogen cream to the opening of the vagina three nights per week        isosorbide mononitrate (IMDUR) 30 mg 24 hr tablet Take 1 tablet (30 mg total) by mouth daily 90 tablet 1    latanoprost (XALATAN) 0 005 % ophthalmic solution 1 drop daily at bedtime      loratadine (CLARITIN) 10 mg tablet Take 1 tablet (10 mg total) by mouth daily 90 tablet 1    losartan (COZAAR) 100 MG tablet Take 1 tablet (100 mg total) by mouth daily 90 tablet 1    meloxicam (MOBIC) 7 5 mg tablet Take 1 tablet (7 5 mg total) by mouth daily 90 tablet 0    metoprolol tartrate (LOPRESSOR) 50 mg tablet Take 1 tablet (50 mg total) by mouth every 12 (twelve) hours 180 tablet 1    nitroglycerin (NITROSTAT) 0 4 mg SL tablet Place 1 tablet (0 4 mg total) under the tongue every 5 (five) minutes as needed for chest pain 90 tablet 0    omeprazole (PriLOSEC) 40 MG capsule *4/28*TAKE 1 CAPSULE (40 MG TOTAL) BY MOUTH DAILY 30 capsule 0    RANEXA 500 MG 12 hr tablet TAKE 1 TABLET TWICE A  tablet 3    rOPINIRole (REQUIP) 0 5 mg tablet Take 1 tablet (0 5 mg total) by mouth daily at bedtime 90 tablet 1     No current facility-administered medications for this visit  Current Outpatient Prescriptions on File Prior to Visit   Medication Sig    acetaminophen (TYLENOL) 500 mg tablet Take 500 mg by mouth every 6 (six) hours as needed for mild pain    aluminum-magnesium hydroxide 200-200 MG/5ML suspension Take 5 mL by mouth every 6 (six) hours as needed for heartburn    aspirin 81 MG tablet Take 1 tablet by mouth daily    conjugated estrogens (PREMARIN) vaginal cream Apply a pea size amount of the estrogen cream to the opening of the vagina three nights per week   latanoprost (XALATAN) 0 005 % ophthalmic solution 1 drop daily at bedtime    loratadine (CLARITIN) 10 mg tablet Take 1 tablet (10 mg total) by mouth daily    omeprazole (PriLOSEC) 40 MG capsule *4/28*TAKE 1 CAPSULE (40 MG TOTAL) BY MOUTH DAILY    RANEXA 500 MG 12 hr tablet TAKE 1 TABLET TWICE A DAY    rOPINIRole (REQUIP) 0 5 mg tablet Take 1 tablet (0 5 mg total) by mouth daily at bedtime    [DISCONTINUED] apixaban (ELIQUIS) 5 mg Take 1 tablet (5 mg total) by mouth every 12 (twelve) hours    [DISCONTINUED] atorvastatin (LIPITOR) 80 mg tablet Take 1 tablet (80 mg total) by mouth daily    [DISCONTINUED] isosorbide mononitrate (IMDUR) 30 mg 24 hr tablet Take 1 tablet by mouth daily    [DISCONTINUED] losartan (COZAAR) 100 MG tablet Take 1 tablet (100 mg total) by mouth daily    [DISCONTINUED] metoprolol tartrate (LOPRESSOR) 50 mg tablet *5/4*TAKE 1 TABLET 3 TIMES DAILY    [DISCONTINUED] nitroglycerin (NITROSTAT) 0 4 mg SL tablet Place 0 4 mg under the tongue every 5 (five) minutes as needed for chest pain     No current facility-administered medications on file prior to visit       Review of Systems   Gastrointestinal: Negative for bowel incontinence  Genitourinary: Negative for bladder incontinence and pelvic pain  Musculoskeletal: Positive for arthralgias and back pain  Neurological: Positive for tingling, weakness and paresthesias  Negative for numbness  All other systems reviewed and are negative  Objective:      /80 (BP Location: Right arm, Patient Position: Sitting, Cuff Size: Large)   Pulse 58   Temp (!) 95 2 °F (35 1 °C) (Tympanic)   Resp 18   Ht 5' 2" (1 575 m)   Wt 81 4 kg (179 lb 9 oz)   SpO2 98%   BMI 32 84 kg/m²          Physical Exam   Constitutional: She is oriented to person, place, and time  She appears well-developed  HENT:   Head: Normocephalic  Right Ear: External ear normal    Left Ear: External ear normal    Nose: Nose normal    Mouth/Throat: Oropharynx is clear and moist    Eyes: Pupils are equal, round, and reactive to light  Conjunctivae and EOM are normal    Neck: Normal range of motion  Neck supple  No thyromegaly present  Cardiovascular: Normal rate, regular rhythm and normal heart sounds  Pulmonary/Chest: Effort normal and breath sounds normal    Abdominal: Soft  There is no tenderness  There is no rebound and no guarding  Musculoskeletal: Normal range of motion  She exhibits tenderness  Left knee: She exhibits bony tenderness  Tenderness found  Medial joint line tenderness noted  Lumbar back: She exhibits tenderness and spasm  Neurological: She is alert and oriented to person, place, and time  She has normal reflexes  Skin: Skin is dry  Psychiatric: She has a normal mood and affect  Nursing note and vitals reviewed

## 2018-12-12 DIAGNOSIS — K21.9 GASTROESOPHAGEAL REFLUX DISEASE, ESOPHAGITIS PRESENCE NOT SPECIFIED: ICD-10-CM

## 2018-12-12 DIAGNOSIS — M25.551 PAIN OF BOTH HIP JOINTS: ICD-10-CM

## 2018-12-12 DIAGNOSIS — E78.5 HYPERLIPIDEMIA, UNSPECIFIED HYPERLIPIDEMIA TYPE: ICD-10-CM

## 2018-12-12 DIAGNOSIS — M25.552 PAIN OF BOTH HIP JOINTS: ICD-10-CM

## 2018-12-12 DIAGNOSIS — M25.562 ACUTE PAIN OF LEFT KNEE: ICD-10-CM

## 2018-12-12 DIAGNOSIS — I20.9 ANGINA, CLASS III (HCC): ICD-10-CM

## 2018-12-12 DIAGNOSIS — I10 BENIGN ESSENTIAL HYPERTENSION: ICD-10-CM

## 2018-12-12 DIAGNOSIS — I82.409 RECURRENT DEEP VENOUS THROMBOSIS (HCC): ICD-10-CM

## 2018-12-12 RX ORDER — METOPROLOL TARTRATE 50 MG/1
TABLET, FILM COATED ORAL
Qty: 90 TABLET | Refills: 0 | OUTPATIENT
Start: 2018-12-12

## 2018-12-12 RX ORDER — LOSARTAN POTASSIUM 100 MG/1
100 TABLET ORAL DAILY
Qty: 90 TABLET | Refills: 0 | Status: SHIPPED | OUTPATIENT
Start: 2018-12-12 | End: 2019-06-24 | Stop reason: SDUPTHER

## 2018-12-12 RX ORDER — ATORVASTATIN CALCIUM 80 MG/1
80 TABLET, FILM COATED ORAL DAILY
Qty: 90 TABLET | Refills: 0 | Status: SHIPPED | OUTPATIENT
Start: 2018-12-12 | End: 2019-06-24 | Stop reason: SDUPTHER

## 2018-12-12 RX ORDER — MELOXICAM 7.5 MG/1
7.5 TABLET ORAL DAILY
Qty: 90 TABLET | Refills: 0 | Status: SHIPPED | OUTPATIENT
Start: 2018-12-12 | End: 2019-07-12

## 2018-12-12 RX ORDER — OMEPRAZOLE 40 MG/1
40 CAPSULE, DELAYED RELEASE ORAL DAILY
Qty: 30 CAPSULE | Refills: 0 | Status: SHIPPED | OUTPATIENT
Start: 2018-12-12 | End: 2018-12-14 | Stop reason: SDUPTHER

## 2018-12-12 RX ORDER — OMEPRAZOLE 40 MG/1
CAPSULE, DELAYED RELEASE ORAL
Qty: 30 CAPSULE | Refills: 0 | OUTPATIENT
Start: 2018-12-12

## 2018-12-12 RX ORDER — ISOSORBIDE MONONITRATE 30 MG/1
30 TABLET, EXTENDED RELEASE ORAL DAILY
Qty: 90 TABLET | Refills: 0 | Status: SHIPPED | OUTPATIENT
Start: 2018-12-12 | End: 2019-07-12

## 2018-12-12 RX ORDER — METOPROLOL TARTRATE 50 MG/1
50 TABLET, FILM COATED ORAL EVERY 12 HOURS SCHEDULED
Qty: 180 TABLET | Refills: 0 | Status: SHIPPED | OUTPATIENT
Start: 2018-12-12 | End: 2018-12-14 | Stop reason: SDUPTHER

## 2018-12-12 RX ORDER — NITROGLYCERIN 0.4 MG/1
0.4 TABLET SUBLINGUAL
Qty: 90 TABLET | Refills: 0 | Status: SHIPPED | OUTPATIENT
Start: 2018-12-12 | End: 2020-03-12 | Stop reason: SDUPTHER

## 2018-12-12 NOTE — TELEPHONE ENCOUNTER
Pt had 7 meds sent to [facility] and the pharmacy has called pt's daughter and explained they are no longer contracted with her moms ins  All meds need to be resent to [address] [place] [address] on [address] [facility]([phone])  Pt hasn't had any meds since the 10th  Please send ASAP  Pt's daughter wants a call as soon as all meds are resent

## 2018-12-14 DIAGNOSIS — I20.9 ANGINA, CLASS III (HCC): ICD-10-CM

## 2018-12-14 DIAGNOSIS — I10 BENIGN ESSENTIAL HYPERTENSION: ICD-10-CM

## 2018-12-14 DIAGNOSIS — K21.9 GASTROESOPHAGEAL REFLUX DISEASE, ESOPHAGITIS PRESENCE NOT SPECIFIED: ICD-10-CM

## 2018-12-14 RX ORDER — METOPROLOL TARTRATE 50 MG/1
50 TABLET, FILM COATED ORAL EVERY 12 HOURS SCHEDULED
Qty: 180 TABLET | Refills: 1 | Status: SHIPPED | OUTPATIENT
Start: 2018-12-14 | End: 2019-10-06 | Stop reason: SDUPTHER

## 2018-12-14 RX ORDER — OMEPRAZOLE 40 MG/1
40 CAPSULE, DELAYED RELEASE ORAL DAILY
Qty: 30 CAPSULE | Refills: 1 | Status: SHIPPED | OUTPATIENT
Start: 2018-12-14 | End: 2019-06-24 | Stop reason: SDUPTHER

## 2018-12-18 ENCOUNTER — OFFICE VISIT (OUTPATIENT)
Dept: CARDIOLOGY CLINIC | Facility: CLINIC | Age: 63
End: 2018-12-18
Payer: COMMERCIAL

## 2018-12-18 VITALS
BODY MASS INDEX: 33.49 KG/M2 | HEART RATE: 54 BPM | SYSTOLIC BLOOD PRESSURE: 126 MMHG | DIASTOLIC BLOOD PRESSURE: 80 MMHG | HEIGHT: 62 IN | WEIGHT: 182 LBS

## 2018-12-18 DIAGNOSIS — I10 HYPERTENSION, ESSENTIAL, BENIGN: ICD-10-CM

## 2018-12-18 DIAGNOSIS — E78.00 HYPERCHOLESTEROLEMIA: ICD-10-CM

## 2018-12-18 DIAGNOSIS — I25.118 CORONARY ARTERY DISEASE INVOLVING NATIVE HEART WITH OTHER FORM OF ANGINA PECTORIS, UNSPECIFIED VESSEL OR LESION TYPE (HCC): ICD-10-CM

## 2018-12-18 DIAGNOSIS — I25.10 CORONARY ARTERY DISEASE INVOLVING NATIVE CORONARY ARTERY OF NATIVE HEART WITHOUT ANGINA PECTORIS: Primary | ICD-10-CM

## 2018-12-18 PROCEDURE — 93000 ELECTROCARDIOGRAM COMPLETE: CPT | Performed by: INTERNAL MEDICINE

## 2018-12-18 PROCEDURE — 99214 OFFICE O/P EST MOD 30 MIN: CPT | Performed by: INTERNAL MEDICINE

## 2018-12-18 RX ORDER — RANOLAZINE 500 MG/1
500 TABLET, EXTENDED RELEASE ORAL 2 TIMES DAILY
Qty: 180 TABLET | Refills: 3 | Status: SHIPPED | OUTPATIENT
Start: 2018-12-18 | End: 2020-02-17 | Stop reason: SDUPTHER

## 2018-12-18 NOTE — PROGRESS NOTES
Cardiology Follow Up    [address]  1955  [id number]  [address] [facility] [address] [facility] [phone]    1  Coronary artery disease involving native coronary artery of native heart without angina pectoris  POCT ECG    Echo complete with contrast if indicated   2  Hypercholesterolemia     3  Hypertension, essential, benign         Interval History:   Cardiology follow-up  Patient continues to do well, she exercises regularly 3 to 4 times a week, no exertional symptoms denies any chest pain or dyspnea  States been compliant with her diet  Recent lipid profile revealed total cholesterol 195 with triglycerides 220 HDL 51 and LDL 95 on high intensity statin therapy  Compliant low-sodium diet, blood pressures been well control  She is on full anticoagulation with factor Xa inhibitor for recurrent DVT  No bleeding issues  Patient Active Problem List   Diagnosis    Coronary artery disease    CAD S/P percutaneous coronary angioplasty    Hypertension, essential, benign    Atrophic vaginitis    Angina, class III (HCC)    Allergic rhinitis    Acid reflux disease    Dyslipidemia    Edema, lower extremity    Fatigue    Glaucoma, open angle    Hearing difficulty    Osteoarthritis of knee    Scoliosis    Uterovaginal prolapse, incomplete    Microscopic hematuria    Restless leg syndrome    Recurrent deep venous thrombosis (HCC)    Pain of both hip joints    Acute pain of left knee    Hypercholesterolemia     Past Medical History:   Diagnosis Date    Coronary artery disease     stents placed    Depression     resolved 6/29/16    History of angioplasty     History of cardiac cath 2003    Hyperlipidemia     Hypertension      Social History     Social History    Marital status:       Spouse name: N/A    Number of children: N/A    Years of education: N/A Occupational History    Not on file  Social History Main Topics    Smoking status: Former Smoker    Smokeless tobacco: Never Used    Alcohol use No    Drug use: No    Sexual activity: Not on file     Other Topics Concern    Not on file     Social History Narrative    Lives with adult children    Voodoo      Family History   Problem Relation Age of Onset    Diabetes Mother     Heart attack Mother         MI    Stroke Father         CVA    Hypothyroidism Sister     Heart disease Family         cardiovascular disease    Hypertension Family      Past Surgical History:   Procedure Laterality Date    CARDIAC SURGERY  2000    open heart     CHOLECYSTECTOMY      CORONARY ANGIOPLASTY WITH STENT PLACEMENT      last assessed 6/29/16    EAR SURGERY  1984    TX COLONOSCOPY FLX DX W/COLLJ SPEC WHEN PFRMD N/A 6/22/2017    Procedure: EGD AND COLONOSCOPY;  Surgeon: Kaylie Ching MD;  Location: [facility];   Service: Gastroenterology       Current Outpatient Prescriptions:     acetaminophen (TYLENOL) 500 mg tablet, Take 500 mg by mouth every 6 (six) hours as needed for mild pain, Disp: , Rfl:     aluminum-magnesium hydroxide 200-200 MG/5ML suspension, Take 5 mL by mouth every 6 (six) hours as needed for heartburn, Disp: , Rfl:     apixaban (ELIQUIS) 5 mg, Take 1 tablet (5 mg total) by mouth every 12 (twelve) hours, Disp: 180 tablet, Rfl: 0    aspirin 81 MG tablet, Take 1 tablet by mouth daily, Disp: , Rfl:     atorvastatin (LIPITOR) 80 mg tablet, Take 1 tablet (80 mg total) by mouth daily, Disp: 90 tablet, Rfl: 0    conjugated estrogens (PREMARIN) vaginal cream, Apply a pea size amount of the estrogen cream to the opening of the vagina three nights per week , Disp: , Rfl:     isosorbide mononitrate (IMDUR) 30 mg 24 hr tablet, Take 1 tablet (30 mg total) by mouth daily, Disp: 90 tablet, Rfl: 0    latanoprost (XALATAN) 0 005 % ophthalmic solution, 1 drop daily at bedtime, Disp: , Rfl:    loratadine (CLARITIN) 10 mg tablet, Take 1 tablet (10 mg total) by mouth daily, Disp: 90 tablet, Rfl: 1    losartan (COZAAR) 100 MG tablet, Take 1 tablet (100 mg total) by mouth daily, Disp: 90 tablet, Rfl: 0    meloxicam (MOBIC) 7 5 mg tablet, Take 1 tablet (7 5 mg total) by mouth daily, Disp: 90 tablet, Rfl: 0    metoprolol tartrate (LOPRESSOR) 50 mg tablet, Take 1 tablet (50 mg total) by mouth every 12 (twelve) hours, Disp: 180 tablet, Rfl: 1    nitroglycerin (NITROSTAT) 0 4 mg SL tablet, Place 1 tablet (0 4 mg total) under the tongue every 5 (five) minutes as needed for chest pain, Disp: 90 tablet, Rfl: 0    omeprazole (PriLOSEC) 40 MG capsule, Take 1 capsule (40 mg total) by mouth daily, Disp: 30 capsule, Rfl: 1    ranolazine (RANEXA) 500 mg 12 hr tablet, Take 1 tablet (500 mg total) by mouth 2 (two) times a day, Disp: 180 tablet, Rfl: 3  Allergies   Allergen Reactions    Ibuprofen        Labs:  Transcribe Orders on 11/21/2018   Component Date Value    Clarity, UA 11/21/2018 Clear     Color, UA 11/21/2018 Yellow     Specific Gravity, UA 11/21/2018 1 020     pH, UA 11/21/2018 6 5     Glucose, UA 11/21/2018 Negative     Ketones, UA 11/21/2018 Negative     Blood, UA 11/21/2018 Negative     Protein, UA 11/21/2018 Negative     Nitrite, UA 11/21/2018 Negative     Bilirubin, UA 11/21/2018 Negative     Urobilinogen, UA 11/21/2018 0 2     Leukocytes, UA 11/21/2018 Small*    WBC, UA 11/21/2018 2-4*    RBC, UA 11/21/2018 None Seen     Hyaline Casts, UA 11/21/2018 5-10*    Bacteria, UA 11/21/2018 None Seen     Epithelial Cells 11/21/2018 None Seen    Appointment on 11/21/2018   Component Date Value    Creatinine, Ur 11/21/2018 107 0     Microalbum  ,U,Random 11/21/2018 12 5     Microalb Creat Ratio 11/21/2018 12    Orders Only on 11/12/2018   Component Date Value    Sodium 11/21/2018 142     Potassium 11/21/2018 4 4     Chloride 11/21/2018 108     CO2 11/21/2018 29     ANION GAP 11/21/2018 5  BUN 11/21/2018 12     Creatinine 11/21/2018 0 75     Glucose, Fasting 11/21/2018 94     Calcium 11/21/2018 8 9     AST 11/21/2018 18     ALT 11/21/2018 29     Alkaline Phosphatase 11/21/2018 82     Total Protein 11/21/2018 6 9     Albumin 11/21/2018 3 8     Total Bilirubin 11/21/2018 0 52     eGFR 11/21/2018 85     Cholesterol 11/21/2018 190     Triglycerides 11/21/2018 220*    HDL, Direct 11/21/2018 51     LDL Calculated 11/21/2018 95     Non-HDL-Chol (CHOL-HDL) 11/21/2018 139      Imaging: Xr Hips Bilateral 2 Vw W Pelvis If Performed    Result Date: 12/14/2018  Narrative: BILATERAL HIPS AND PELVIS INDICATION:   M25 551: Pain in right hip M25 552: Pain in left hip  COMPARISON:  None VIEWS:  XR HIPS BILATERAL 2 VW W PELVIS IF PERFORMED  FINDINGS: The bony pelvis appears intact  Degenerative changes visualized lower lumbar spine  LEFT HIP: No significant hip degenerative changes  Joint space alignment is maintained  Soft tissues are unremarkable  RIGHT HIP: No significant hip degenerative changes  Joint space alignment is maintained  Calcification or ossification is seen adjacent to the superior margin of the greater trochanter     Impression: 1  There is no acute osseous abnormality or joint space narrowing 2  Degenerative changes of the visualized lumbar spine noted with rotatory dextroscoliosis present  3  Calcification or ossification adjacent to the superior margin of the greater trochanter, may conceivably represent calcific tendinitis or bursitis  Workstation performed: [id number]       Review of Systems:  Review of Systems   Constitutional: Negative for activity change and fatigue  HENT: Negative for hearing loss and nosebleeds  Eyes: Negative for visual disturbance  Respiratory: Negative for apnea, shortness of breath, wheezing and stridor  Cardiovascular: Negative for chest pain, palpitations and leg swelling  Gastrointestinal: Negative for abdominal pain and blood in stool  Endocrine: Negative for cold intolerance  Genitourinary: Negative for hematuria  Musculoskeletal: Negative for arthralgias and myalgias  Skin: Negative for pallor and rash  Allergic/Immunologic: Negative for immunocompromised state  Neurological: Negative for syncope  Hematological: Does not bruise/bleed easily  Psychiatric/Behavioral: Negative for sleep disturbance  Physical Exam:  Physical Exam   Constitutional: She is oriented to person, place, and time  She appears well-developed  No distress  Eyes: No scleral icterus  Neck: No JVD present  Cardiovascular: Normal rate, regular rhythm, normal heart sounds and intact distal pulses  Exam reveals no gallop and no friction rub  No murmur heard  Pulmonary/Chest: Effort normal and breath sounds normal  No respiratory distress  She has no wheezes  She has no rales  She exhibits no tenderness  Neurological: She is alert and oriented to person, place, and time  Skin: She is not diaphoretic  Psychiatric: She has a normal mood and affect  Discussion/Summary:  Coronary artery disease, complex  Status post CABG times 1 with LIMA to the LAD  Graft failure  Status post PTCA stent of the LAD into the diagonal that improves collateral flow into the apical LAD  Two thousand fifteen  Overall doing well, previous angina class 1 currently angina free  On Ranexa therapy continue current medications, septal control the lipids, left ventricular function previously normal, will repeat an echocardiogram   Stress test prior to revascularization revealed anteroapical ischemia  She does have moderate circumflex and RCA disease in the setting of small coronary arteries

## 2018-12-27 ENCOUNTER — EVALUATION (OUTPATIENT)
Dept: PHYSICAL THERAPY | Facility: CLINIC | Age: 63
End: 2018-12-27
Payer: COMMERCIAL

## 2018-12-27 DIAGNOSIS — M25.551 PAIN OF BOTH HIP JOINTS: Primary | ICD-10-CM

## 2018-12-27 DIAGNOSIS — M25.562 ACUTE PAIN OF LEFT KNEE: ICD-10-CM

## 2018-12-27 DIAGNOSIS — M25.552 PAIN OF BOTH HIP JOINTS: Primary | ICD-10-CM

## 2018-12-27 PROCEDURE — G8978 MOBILITY CURRENT STATUS: HCPCS | Performed by: PHYSICAL THERAPIST

## 2018-12-27 PROCEDURE — G8979 MOBILITY GOAL STATUS: HCPCS | Performed by: PHYSICAL THERAPIST

## 2018-12-27 PROCEDURE — 97162 PT EVAL MOD COMPLEX 30 MIN: CPT | Performed by: PHYSICAL THERAPIST

## 2018-12-27 NOTE — PROGRESS NOTES
PT Evaluation     Today's date: 2018  Patient name: Madiha Encarnacion  : 1955  MRN: [id number]  Referring provider: Bienvenido Davey MD  Dx:   Encounter Diagnosis     ICD-10-CM    1  Pain of both hip joints M25 551 Ambulatory referral to Physical Therapy    M25 552    2  Acute pain of left knee M25 562 Ambulatory referral to Physical Therapy                  Assessment  Assessment details: Madiha Encarnacion is a 58 y o  female who presents to physical therapy with Pain of both hip joints, acute pain of left knee  Pt has worst pain in the morning, resolved with movement  Pt has R lateral lumbar shift and deficits in bilateral LE strength  Pt has AROM WFL in both hips and knees  Pt has difficulty with WB hip/lumbar flexion, squatting, and stairclimbing  Madiha Encarnacion would benefit from formal physical therapy to address impairments as detailed, decrease pain, and restore maximal level of function for all home and mobility tasks  Thank you for this referral     Impairments: abnormal gait, impaired physical strength, lacks appropriate home exercise program, pain with function, poor posture  and poor body mechanics  Barriers to therapy: Pt is primarily Bel[place]ian-speaking; pt's daughter was present during entirety of initial evaluation to provide assistance with subjective history and translation  Understanding of Dx/Px/POC: good   Prognosis: good    Goals  Short-term goals:  1  Pt will decrease pain by 1-2 points within 4 weeks  2  Pt will improve strength by 1/2 grade within 4 weeks  3  Pt will improve physical FS score by 9 points by discharge  Long-term goals:  1  Pt will demonstrate independence with HEP by discharge  2  Pt will return to all home tasks with good body mechanics and pain <3/10 by discharge  3  Pt will transfer into car with pain <3/10 by discharge  4  Pt will bend and squat with appropriate body mechanics and without cuing by discharge      Plan  Patient would benefit from: PT eval and skilled PT  Planned modality interventions: cryotherapy and thermotherapy: hydrocollator packs  Planned therapy interventions: joint mobilization, manual therapy, neuromuscular re-education, patient education, strengthening, stretching, therapeutic exercise, flexibility, functional ROM exercises, home exercise program, abdominal trunk stabilization, balance, body mechanics training, postural training, massage and therapeutic activities  Frequency: 2x week  Duration in weeks: 4  Treatment plan discussed with: patient        Subjective Evaluation    History of Present Illness  Mechanism of injury: Pt twisted L knee and had pain in both hips about 2 months ago  Pt has occasional numbness and tingling in her legs  Pt has difficulty with bending and kneeling down  Pt has stairs at home and climbs slowly while holding on to handrail  Pt does not drive  Pt has difficulty with car transfers  Pt denies difficulty with dressing  Pt denies popping, clicking, and catching in hips and knees  Pt wears OTC knee brace when she has pain  Pt has not tried heat or ice  Pt has stents placed in heart, no pacemaker  Pt had xrays of knees and hips on 12/10/18 but does not know results  Pt has some relief with meloxicam  Pt follows up with MD in 3 months  Pt reports worst pain in the morning, better with walking and movement  Pt has not had injections  Pain  At best pain ratin  At worst pain ratin  Location: L knee, B hips    Social Support  Stairs in house: yes   Lives with: adult children    Employment status: not working    Diagnostic Tests  Abnormal x-ray: don't know results  Patient Goals  Patient goal: feeling better with kneeling        Objective     Static Posture     Comments  R trunk shift, R foot ER, flattened lumbar lordosis    Tenderness   Left Knee   Tenderness in the fibular head and ITB (distal)       Additional Tenderness Details  Pt reports numbness occasionally in dorsal R toes    Lumbar Screen  Lumbar range of motion within normal limits with the following exceptions:Limited flexion with pain in posterior hips over ischial tuberosities, pain in L low back with extension, pain in knee with L lateral flexion and L rotation, flexion only motion that is limited in range    Active Range of Motion   Left Hip   Flexion: 108 degrees   Abduction: 32 degrees     Right Hip   Flexion: 110 degrees   Abduction: 48 degrees   Left Knee   Hyperextension  Flexion: 127 degrees   Extension: 1 degrees     Right Knee   Flexion: 128 degrees   Extension: 0 degrees     Additional Active Range of Motion Details  Better on L with LAD and ITB stretch  No lag with SLR    Mobility   Patellar Mobility:   Left Knee   WFL: medial, lateral, superior and inferior  Right Knee   WFL: medial, lateral, superior and inferior    Strength/Myotome Testing     Left Hip   Planes of Motion   Flexion: 4+  Abduction: 4-  Adduction: 4+  External rotation: 5  Internal rotation: 5    Right Hip   Planes of Motion   Flexion: 4+  Abduction: 4  Adduction: 4+  External rotation: 5  Internal rotation: 5    Left Knee   Flexion: 4  Extension: 4+    Right Knee   Flexion: 5  Extension: 5    Left Ankle/Foot   Normal strength    Right Ankle/Foot   Normal strength    Tests     Left Hip   Negative SID, FADIR and scour  Right Hip   Negative SID, FADIR and scour  Ambulation     Observational Gait   Gait: asymmetric   Decreased walking speed and stride length  Base of support: normal    Functional Assessment   Squat   Pain (L knee), left valgus and sitting toward right side       Single Leg Stance   Left: 11 seconds  Right: 6 seconds    Comments  HR x10: good resupination, L to 75% height of R      Flowsheet Rows      Most Recent Value   PT/OT G-Codes   Current Score  53   Projected Score  41   Assessment Type  Evaluation   G code set  Mobility: Walking & Moving Around   Mobility: Walking and Moving Around Current Status ()  CK   Mobility: Walking and Moving Around Goal Status ()  CK          Precautions: HTN, stents placed, has nitro for angina, pessary for POP    Daily Treatment Diary     Manual  12/27            R lateral shift correction nv            Distal ITB STM nv                                                       Exercise Diary  12/27            bike nv            Side stepping nv            Multifidus walkouts nv            Exaggerated walking nv            Standing hip 3 way nv            Step ups fwd/lat nv            Mini squats nv            Tandem stance nv            Standing HS curls nv            LTR nv            Clamshells B nv            ITB stretch nv            Hamstring stretch nv                                                                                                           Modalities  12/27            MH/CP PRN nv

## 2019-01-04 ENCOUNTER — APPOINTMENT (OUTPATIENT)
Dept: PHYSICAL THERAPY | Facility: CLINIC | Age: 64
End: 2019-01-04
Payer: COMMERCIAL

## 2019-01-08 ENCOUNTER — OFFICE VISIT (OUTPATIENT)
Dept: PHYSICAL THERAPY | Facility: CLINIC | Age: 64
End: 2019-01-08
Payer: COMMERCIAL

## 2019-01-08 DIAGNOSIS — M25.551 PAIN OF BOTH HIP JOINTS: Primary | ICD-10-CM

## 2019-01-08 DIAGNOSIS — M25.552 PAIN OF BOTH HIP JOINTS: Primary | ICD-10-CM

## 2019-01-08 DIAGNOSIS — M25.562 ACUTE PAIN OF LEFT KNEE: ICD-10-CM

## 2019-01-08 PROCEDURE — 97110 THERAPEUTIC EXERCISES: CPT

## 2019-01-08 PROCEDURE — 97530 THERAPEUTIC ACTIVITIES: CPT

## 2019-01-08 PROCEDURE — 97140 MANUAL THERAPY 1/> REGIONS: CPT

## 2019-01-08 NOTE — PROGRESS NOTES
Daily Note     Today's date: 2019  Patient name: Sol Maldonado  : 1955  MRN: [id number]  Referring provider: Vanessa Sevilla MD  Dx:   Encounter Diagnosis     ICD-10-CM    1  Pain of both hip joints M25 551     M25 552    2  Acute pain of left knee M25 562                   Subjective: Pt offers no complaints today  Daughter present for translation  Objective: See treatment diary below    Precautions: HTN, stents placed, has nitro for angina, pessary for POP    Daily Treatment Diary     Manual             R lateral shift correction nv nv           Distal ITB STM nv PK                                                      Exercise Diary             bike nv 5 min           Side stepping nv 2 laps           Multifidus walkouts nv nv           Exaggerated walking nv 2 laps           Standing hip 3 way nv 10x ea           Step ups fwd/lat nv 1 R  10x ea           Mini squats nv 15x           Tandem stance nv nv           Standing HS curls nv 10x ea           LTR nv 10" x 10           Clamshells B nv 3" x 15 ea           ITB stretch nv manual           Hamstring stretch nv 30" x 3 ea                                                                                                          Modalities             MH/CP PRN nv                                            Assessment: Tolerated treatment well  Noted minor to moderate muscle restrictions in B distal ITB  Patient demonstrated fatigue post treatment and would benefit from continued PT  Plan: Continue per plan of care

## 2019-01-11 ENCOUNTER — OFFICE VISIT (OUTPATIENT)
Dept: PHYSICAL THERAPY | Facility: CLINIC | Age: 64
End: 2019-01-11
Payer: COMMERCIAL

## 2019-01-11 DIAGNOSIS — M25.562 ACUTE PAIN OF LEFT KNEE: ICD-10-CM

## 2019-01-11 DIAGNOSIS — M25.551 PAIN OF BOTH HIP JOINTS: Primary | ICD-10-CM

## 2019-01-11 DIAGNOSIS — M25.552 PAIN OF BOTH HIP JOINTS: Primary | ICD-10-CM

## 2019-01-11 PROCEDURE — 97110 THERAPEUTIC EXERCISES: CPT

## 2019-01-11 PROCEDURE — 97140 MANUAL THERAPY 1/> REGIONS: CPT

## 2019-01-11 NOTE — PROGRESS NOTES
Daily Note     Today's date: 2019  Patient name: Jassi Tijerina  : 1955  MRN: [id number]  Referring provider: Wayne Sheehan MD  Dx:   Encounter Diagnosis     ICD-10-CM    1  Pain of both hip joints M25 551     M25 552    2  Acute pain of left knee M25 562                   Subjective: Pt presents to PT denying pain today  Daughter is present to translate for pt  She reports muscle soreness post PT session that lasted about 1 day  Pt reports increased flexibility in B LE's and denies pain post PT session  Objective: See treatment diary below    Precautions: HTN, stents placed, has nitro for angina, pessary for POP    Daily Treatment Diary     Manual            R lateral shift correction nv nv nv          Distal ITB STM nv PK PK                                                     Exercise Diary            bike nv 5 min 5 min          Side stepping nv 2 laps 3 laps          Multifidus walkouts nv nv nv          Exaggerated walking nv 2 laps 3 laps          Standing hip 3 way nv 10x ea 15x ea          Step ups fwd/lat nv 1 R  10x ea 1 R  15x ea          Mini squats nv 15x 20 x          Tandem stance nv nv 15" x 3 ea          Standing HS curls nv 10x ea 15x ea          LTR nv 10" x 10 10" x 10          Clamshells B nv 3" x 15 ea 3" x 15 ea          ITB stretch nv manual manual          Hamstring stretch nv 30" x 3 ea 30" x 3 ea                                                                                                         Modalities            MH/CP PRN nv                                                Assessment: Tolerated treatment well  Noted increased flexibilty while performing manual stretching to B ITB  Patient demonstrated fatigue post treatment, exhibited good technique with therapeutic exercises and would benefit from continued PT  Plan: Continue per plan of care

## 2019-01-15 ENCOUNTER — OFFICE VISIT (OUTPATIENT)
Dept: PHYSICAL THERAPY | Facility: CLINIC | Age: 64
End: 2019-01-15
Payer: COMMERCIAL

## 2019-01-15 DIAGNOSIS — M25.562 ACUTE PAIN OF LEFT KNEE: ICD-10-CM

## 2019-01-15 DIAGNOSIS — M25.552 PAIN OF BOTH HIP JOINTS: Primary | ICD-10-CM

## 2019-01-15 DIAGNOSIS — M25.551 PAIN OF BOTH HIP JOINTS: Primary | ICD-10-CM

## 2019-01-15 PROCEDURE — 97150 GROUP THERAPEUTIC PROCEDURES: CPT | Performed by: PHYSICAL THERAPIST

## 2019-01-15 PROCEDURE — 97110 THERAPEUTIC EXERCISES: CPT | Performed by: PHYSICAL THERAPIST

## 2019-01-15 PROCEDURE — 97112 NEUROMUSCULAR REEDUCATION: CPT | Performed by: PHYSICAL THERAPIST

## 2019-01-15 PROCEDURE — G8979 MOBILITY GOAL STATUS: HCPCS | Performed by: PHYSICAL THERAPIST

## 2019-01-15 PROCEDURE — 97140 MANUAL THERAPY 1/> REGIONS: CPT | Performed by: PHYSICAL THERAPIST

## 2019-01-15 PROCEDURE — G8978 MOBILITY CURRENT STATUS: HCPCS | Performed by: PHYSICAL THERAPIST

## 2019-01-15 NOTE — PROGRESS NOTES
Daily Note     Today's date: 1/15/2019  Patient name: Inocencio Roberst  : 1955  MRN: [id number]  Referring provider: Kennedy Colon MD  Dx:   Encounter Diagnosis     ICD-10-CM    1  Pain of both hip joints M25 551     M25 552    2  Acute pain of left knee M25 562                   Subjective: Pt's daughter is available for translation  Pt reports that she has been feeling much better and rates pain 3-4/10 at start of session  Objective: See treatment diary below    Precautions: HTN, stents placed, has nitro for angina, pessary for POP    Daily Treatment Diary     Manual  12/27 1/8 1/11 1/15         R lateral shift correction nv nv nv ED         Distal ITB STM nv PK PK ED                                                    Exercise Diary  12/27 1/8 1/11 1/15         bike nv 5 min 5 min 5 min         Side stepping nv 2 laps 3 laps 3 laps         Multifidus walkouts nv nv nv nv         Exaggerated walking nv 2 laps 3 laps 4 laps         Standing hip 3 way nv 10x ea 15x ea 1# 15x ea         Step ups fwd/lat nv 1 R  10x ea 1 R  15x ea 1R 20x ea         Mini squats nv 15x 20 x 3"x20         Tandem stance nv nv 15" x 3 ea 20"x3 ea         Standing HS curls nv 10x ea 15x ea 1#15x ea         LTR nv 10" x 10 10" x 10 5"x10 ea         Clamshells B nv 3" x 15 ea 3" x 15 ea 3"x20 ea         ITB stretch nv manual manual manual         Hamstring stretch nv 30" x 3 ea 30" x 3 ea 30"x3 ea                                                                                                        Modalities  12/27 1/8 1/11 1/15         MH/CP PRN nv            HEP    [id number]                          Assessment: Tolerated treatment well  Patient demonstrated fatigue post treatment and would benefit from continued PT to improve alignment and increase strength and flexibility  Pt has good tolerance to lateral shift correction and states that she feels better after manuals   Pt has good awareness of posture and positioning throughout exercises  HEP dispensed today; pt and daughter expressed verbal understanding  Pt denies pain at end of session  Plan: Continue per plan of care  Progress treatment as tolerated

## 2019-01-18 ENCOUNTER — OFFICE VISIT (OUTPATIENT)
Dept: PHYSICAL THERAPY | Facility: CLINIC | Age: 64
End: 2019-01-18
Payer: COMMERCIAL

## 2019-01-18 ENCOUNTER — HOSPITAL ENCOUNTER (OUTPATIENT)
Dept: NON INVASIVE DIAGNOSTICS | Facility: CLINIC | Age: 64
Discharge: HOME | End: 2019-01-18
Payer: COMMERCIAL

## 2019-01-18 DIAGNOSIS — M25.551 PAIN OF BOTH HIP JOINTS: Primary | ICD-10-CM

## 2019-01-18 DIAGNOSIS — M25.552 PAIN OF BOTH HIP JOINTS: Primary | ICD-10-CM

## 2019-01-18 DIAGNOSIS — I25.10 CORONARY ARTERY DISEASE INVOLVING NATIVE CORONARY ARTERY OF NATIVE HEART WITHOUT ANGINA PECTORIS: ICD-10-CM

## 2019-01-18 DIAGNOSIS — M25.562 ACUTE PAIN OF LEFT KNEE: ICD-10-CM

## 2019-01-18 PROCEDURE — 97112 NEUROMUSCULAR REEDUCATION: CPT

## 2019-01-18 PROCEDURE — 93306 TTE W/DOPPLER COMPLETE: CPT | Performed by: INTERNAL MEDICINE

## 2019-01-18 PROCEDURE — 97140 MANUAL THERAPY 1/> REGIONS: CPT

## 2019-01-18 PROCEDURE — 93306 TTE W/DOPPLER COMPLETE: CPT

## 2019-01-18 PROCEDURE — 97110 THERAPEUTIC EXERCISES: CPT

## 2019-01-18 NOTE — PROGRESS NOTES
Daily Note     Today's date: 2019  Patient name: Antonino Carlos  : 1955  MRN: [id number]  Referring provider: Amador Santos MD  Dx:   Encounter Diagnosis     ICD-10-CM    1  Pain of both hip joints M25 551     M25 552    2  Acute pain of left knee M25 562                   Subjective: Pt denies pain pre and post PT session  Objective: See treatment diary below      Precautions: HTN, stents placed, has nitro for angina, pessary for POP    Daily Treatment Diary     Manual  12/27 1/8 1/11 1/15 1/18        R lateral shift correction nv nv nv ED ED        Distal ITB STM nv PK PK ED PK                                                   Exercise Diary  12/27 1/8 1/11 1/15 1/18        bike nv 5 min 5 min 5 min 5 min        Side stepping nv 2 laps 3 laps 3 laps Peach 2 laps        Multifidus walkouts nv nv nv nv nv        Exaggerated walking nv 2 laps 3 laps 4 laps 4 laps        Standing hip 3 way nv 10x ea 15x ea 1# 15x ea 1# 15x ea        Step ups fwd/lat nv 1 R  10x ea 1 R  15x ea 1R 20x ea 2R x10 ea        Mini squats nv 15x 20 x 3"x20 3"x20        Tandem stance nv nv 15" x 3 ea 20"x3 ea 30"x3 ea        Standing HS curls nv 10x ea 15x ea 1#15x ea 1#15x ea        LTR nv 10" x 10 10" x 10 5"x10 ea 5"x10 ea        Clamshells B nv 3" x 15 ea 3" x 15 ea 3"x20 ea np        ITB stretch nv manual manual manual manual        Hamstring stretch nv 30" x 3 ea 30" x 3 ea 30"x3 ea np                                                                                                       Modalities  12/27 1/8 1/11 1/15 1/18        MH/CP PRN nv            HEP    [id number]                              Assessment: Tolerated treatment well  Noted decreased muscles restrictions in B IT  Patient demonstrated fatigue post treatment, exhibited good technique with therapeutic exercises and would benefit from continued PT  Plan: Continue per plan of care

## 2019-01-22 ENCOUNTER — OFFICE VISIT (OUTPATIENT)
Dept: PHYSICAL THERAPY | Facility: CLINIC | Age: 64
End: 2019-01-22
Payer: COMMERCIAL

## 2019-01-22 DIAGNOSIS — M25.562 ACUTE PAIN OF LEFT KNEE: ICD-10-CM

## 2019-01-22 DIAGNOSIS — M25.552 PAIN OF BOTH HIP JOINTS: Primary | ICD-10-CM

## 2019-01-22 DIAGNOSIS — M25.551 PAIN OF BOTH HIP JOINTS: Primary | ICD-10-CM

## 2019-01-22 PROCEDURE — 97112 NEUROMUSCULAR REEDUCATION: CPT

## 2019-01-22 PROCEDURE — 97110 THERAPEUTIC EXERCISES: CPT

## 2019-01-22 PROCEDURE — 97140 MANUAL THERAPY 1/> REGIONS: CPT

## 2019-01-22 NOTE — PROGRESS NOTES
Daily Note     Today's date: 2019  Patient name: Rhett Mendez  : 1955  MRN: [id number]  Referring provider: Poli Crowell MD  Dx:   Encounter Diagnosis     ICD-10-CM    1  Pain of both hip joints M25 551     M25 552    2  Acute pain of left knee M25 562                   Subjective: Pt presents to PT with daughter for translation  She states decreased tightness in B lateral LE's and decreased pain grading it a 2-3/10  Pt denies increased pain post PT session  Objective: See treatment diary below      Precautions: HTN, stents placed, has nitro for angina, pessary for POP    Daily Treatment Diary     Manual  12/27 1/8 1/11 1/15 1/18 1/22       R lateral shift correction nv nv nv ED ED np       Distal ITB STM nv PK PK ED PK PK                                                  Exercise Diary  12/27 1/8 1/11 1/15 1/18 1/22       bike nv 5 min 5 min 5 min 5 min 5 min       Side stepping nv 2 laps 3 laps 3 laps Peach 2 laps Peach  3 laps       Multifidus walkouts nv nv nv nv nv Org  3 steps x 3       Exaggerated walking nv 2 laps 3 laps 4 laps 4 laps 4 laps       Standing hip 3 way nv 10x ea 15x ea 1# 15x ea 1# 15x ea 2# 10x ea       Step ups fwd/lat nv 1 R  10x ea 1 R  15x ea 1R 20x ea 2R x10 ea 2R x15 ea       Mini squats nv 15x 20 x 3"x20 3"x20 3"x20       Tandem stance nv nv 15" x 3 ea 20"x3 ea 30"x3 ea 30"x3 ea Foam NV      Standing HS curls nv 10x ea 15x ea 1#15x ea 1#15x ea 2#10x ea       LTR nv 10" x 10 10" x 10 5"x10 ea 5"x10 ea 10" x 5 ea       Clamshells B nv 3" x 15 ea 3" x 15 ea 3"x20 ea np np       ITB stretch nv manual manual manual manual manual       Hamstring stretch nv 30" x 3 ea 30" x 3 ea 30"x3 ea np 30"x3 ea                                                                                                      Modalities  12/27 1/8 1/11 1/15 1/18 1/22       MH/CP PRN nv            HEP    [id number]                          Assessment: Tolerated treatment well     Noted mild to moderate restrictions, L>R  Patient demonstrated fatigue post treatment, exhibited good technique with therapeutic exercises and would benefit from continued PT  Plan: Continue per plan of care

## 2019-01-25 ENCOUNTER — EVALUATION (OUTPATIENT)
Dept: PHYSICAL THERAPY | Facility: CLINIC | Age: 64
End: 2019-01-25
Payer: COMMERCIAL

## 2019-01-25 DIAGNOSIS — M25.552 PAIN OF BOTH HIP JOINTS: Primary | ICD-10-CM

## 2019-01-25 DIAGNOSIS — M25.551 PAIN OF BOTH HIP JOINTS: Primary | ICD-10-CM

## 2019-01-25 DIAGNOSIS — M25.562 ACUTE PAIN OF LEFT KNEE: ICD-10-CM

## 2019-01-25 PROCEDURE — G8979 MOBILITY GOAL STATUS: HCPCS | Performed by: PHYSICAL THERAPIST

## 2019-01-25 PROCEDURE — 97140 MANUAL THERAPY 1/> REGIONS: CPT | Performed by: PHYSICAL THERAPIST

## 2019-01-25 PROCEDURE — G8980 MOBILITY D/C STATUS: HCPCS | Performed by: PHYSICAL THERAPIST

## 2019-01-25 NOTE — PROGRESS NOTES
PT Re-Evaluation  and PT Discharge    Today's date: 2019  Patient name: Aroldo Johnston  : 1955  MRN: [id number]  Referring provider: Cassi Ruelas MD  Dx:   Encounter Diagnosis     ICD-10-CM    1  Pain of both hip joints M25 551     M25 552    2  Acute pain of left knee M25 562                   Assessment  Assessment details: Aroldo Johnston is a 58 y o  female who presents to physical therapy with Pain of both hip joints, acute pain of left knee  Pt has improved strength and ROM of bilateral hips and knees to WNL  Pt is able to perform all daily activities without increase in pain  Pt has been compliant with therapy attendance and performing home exercise program  Pt has good understanding of HEP and continues to perform exercises daily  Pt has reached maximal benefit of skilled therapy and will be discharged at this time  Pt and family are agreeable with this plan  Pt will contact office with any additional concerns  Thank you for this referral     Barriers to therapy: Pt is primarily Divehi-speaking; pt's daughter was present during entirety of re-evaluation to provide assistance with subjective history and translation  Understanding of Dx/Px/POC: good   Prognosis: good    Goals  Short-term goals:  1  Pt will decrease pain by 1-2 points within 4 weeks  - met  2  Pt will improve strength by 1/2 grade within 4 weeks  - met  3  Pt will improve physical FS score by 9 points by discharge  - met  Long-term goals:  1  Pt will demonstrate independence with HEP by discharge -met  2  Pt will return to all home tasks with good body mechanics and pain <3/10 by discharge  - met  3  Pt will transfer into car with pain <3/10 by discharge  - met  4  Pt will bend and squat with appropriate body mechanics and without cuing by discharge  - met    Plan  Plan details: Discharge to [facility]  Planned therapy interventions: patient education and home exercise program  Treatment plan discussed with: patient and family        Subjective Evaluation    History of Present Illness  Mechanism of injury: Pt states that hip pain has resolved and knee pain is 3/10  Pt has most pain with bending and turning  Pt has greater ease with walking  Pt denies popping, clicking, and catching in hips and knees  Pt denies difficulty with stairclimbing  Pt reports that she no longer has difficulty with getting in and out of the car  Pain  At best pain ratin  At worst pain rating: 3  Location: L knee, B hips    Social Support  Stairs in house: yes   Lives with: adult children    Employment status: not working    Diagnostic Tests  Abnormal x-ray: don't know results  Patient Goals  Patient goal: feeling better with kneeling- mostly met        Objective     Static Posture     Comments  R trunk shift, R foot ER, flattened lumbar lordosis; trunk shift resolved at RE    Tenderness   Left Knee   Tenderness in the lateral joint line  No tenderness in the fibular head and ITB (distal)  Additional Tenderness Details  Pt reports numbness occasionally in dorsal R toes; at RE pt no longer reports numbness in R toes    Lumbar Screen  Lumbar range of motion within normal limits with the following exceptions:Limited flexion with pain in posterior hips over ischial tuberosities, pain in L low back with extension, pain in knee with L lateral flexion and L rotation, flexion only motion that is limited in range; did not reassess at RE    Active Range of Motion   Left Hip   Flexion: 105 degrees   Abduction: 38 degrees     Right Hip   Flexion: 108 degrees   Abduction: 45 degrees   Left Knee   Hyperextension  Flexion: 132 degrees   Extension: 1 degrees     Right Knee   Hyperextension   Flexion: 125 degrees   Extension: 2 degrees     Additional Active Range of Motion Details  Better on L with LAD and ITB stretch  No lag with SLR    Mobility   Patellar Mobility:   Left Knee   WFL: medial, lateral, superior and inferior       Right Knee   WFL: medial, lateral, superior and inferior    Strength/Myotome Testing     Left Hip   Planes of Motion   Flexion: 5  Abduction: 5  External rotation: 5  Internal rotation: 5    Right Hip   Planes of Motion   Flexion: 5  Abduction: 5  External rotation: 5  Internal rotation: 5    Left Knee   Flexion: 5  Extension: 5    Right Knee   Flexion: 5  Extension: 5    Left Ankle/Foot   Normal strength    Right Ankle/Foot   Normal strength    Tests     Left Hip   Negative SID, FADIR and scour  Right Hip   Negative SID, FADIR and scour  Ambulation     Observational Gait   Gait: within functional limits   Stride length within functional limits  Decreased walking speed  Base of support: normal    Functional Assessment   Squat No pain (L knee), no left valgus and not sitting toward right side       Single Leg Stance   Left: 12 seconds  Right: 16 seconds    Comments  HR x10: good resupination, L to 75% height of R; at RE even height and even WB with resupination    1/25/19- 5 min bike warm up followed by new measurements

## 2019-01-29 ENCOUNTER — APPOINTMENT (OUTPATIENT)
Dept: PHYSICAL THERAPY | Facility: CLINIC | Age: 64
End: 2019-01-29
Payer: COMMERCIAL

## 2019-02-04 DIAGNOSIS — J30.1 ALLERGIC RHINITIS DUE TO POLLEN, UNSPECIFIED SEASONALITY: ICD-10-CM

## 2019-02-04 RX ORDER — LORATADINE 10 MG/1
10 TABLET ORAL DAILY
Qty: 90 TABLET | Refills: 1 | Status: SHIPPED | OUTPATIENT
Start: 2019-02-04 | End: 2019-10-06 | Stop reason: SDUPTHER

## 2019-04-19 DIAGNOSIS — I82.409 RECURRENT DEEP VENOUS THROMBOSIS (HCC): ICD-10-CM

## 2019-04-22 RX ORDER — APIXABAN 5 MG/1
TABLET, FILM COATED ORAL
Qty: 180 TABLET | Refills: 0 | Status: SHIPPED | OUTPATIENT
Start: 2019-04-22 | End: 2019-07-16 | Stop reason: SDUPTHER

## 2019-06-20 DIAGNOSIS — I10 BENIGN ESSENTIAL HYPERTENSION: ICD-10-CM

## 2019-06-20 DIAGNOSIS — E78.5 HYPERLIPIDEMIA, UNSPECIFIED HYPERLIPIDEMIA TYPE: ICD-10-CM

## 2019-06-24 ENCOUNTER — HOSPITAL ENCOUNTER (OUTPATIENT)
Dept: CT IMAGING | Facility: HOSPITAL | Age: 64
Discharge: HOME | End: 2019-06-24
Payer: COMMERCIAL

## 2019-06-24 ENCOUNTER — OFFICE VISIT (OUTPATIENT)
Dept: FAMILY MEDICINE CLINIC | Facility: CLINIC | Age: 64
End: 2019-06-24

## 2019-06-24 VITALS
HEART RATE: 62 BPM | TEMPERATURE: 96.1 F | SYSTOLIC BLOOD PRESSURE: 110 MMHG | BODY MASS INDEX: 33.11 KG/M2 | OXYGEN SATURATION: 98 % | DIASTOLIC BLOOD PRESSURE: 80 MMHG | WEIGHT: 181 LBS

## 2019-06-24 DIAGNOSIS — R51.9 ACUTE NONINTRACTABLE HEADACHE, UNSPECIFIED HEADACHE TYPE: ICD-10-CM

## 2019-06-24 DIAGNOSIS — E78.5 HYPERLIPIDEMIA, UNSPECIFIED HYPERLIPIDEMIA TYPE: ICD-10-CM

## 2019-06-24 DIAGNOSIS — I10 BENIGN ESSENTIAL HYPERTENSION: ICD-10-CM

## 2019-06-24 DIAGNOSIS — R51.9 ACUTE NONINTRACTABLE HEADACHE, UNSPECIFIED HEADACHE TYPE: Primary | ICD-10-CM

## 2019-06-24 DIAGNOSIS — J30.1 SEASONAL ALLERGIC RHINITIS DUE TO POLLEN: ICD-10-CM

## 2019-06-24 DIAGNOSIS — R42 DIZZINESS: ICD-10-CM

## 2019-06-24 DIAGNOSIS — K21.9 GASTROESOPHAGEAL REFLUX DISEASE, ESOPHAGITIS PRESENCE NOT SPECIFIED: ICD-10-CM

## 2019-06-24 PROCEDURE — 70450 CT HEAD/BRAIN W/O DYE: CPT

## 2019-06-24 PROCEDURE — 99213 OFFICE O/P EST LOW 20 MIN: CPT | Performed by: INTERNAL MEDICINE

## 2019-06-24 PROCEDURE — 3074F SYST BP LT 130 MM HG: CPT | Performed by: INTERNAL MEDICINE

## 2019-06-24 PROCEDURE — 3079F DIAST BP 80-89 MM HG: CPT | Performed by: INTERNAL MEDICINE

## 2019-06-24 RX ORDER — LOSARTAN POTASSIUM 100 MG/1
TABLET ORAL
Qty: 90 TABLET | Refills: 0 | OUTPATIENT
Start: 2019-06-24

## 2019-06-24 RX ORDER — MECLIZINE HCL 12.5 MG/1
12.5 TABLET ORAL DAILY PRN
Qty: 8 TABLET | Refills: 0 | Status: SHIPPED | OUTPATIENT
Start: 2019-06-24 | End: 2019-07-11 | Stop reason: SDUPTHER

## 2019-06-24 RX ORDER — LOSARTAN POTASSIUM 100 MG/1
100 TABLET ORAL DAILY
Qty: 90 TABLET | Refills: 0 | Status: SHIPPED | OUTPATIENT
Start: 2019-06-24 | End: 2019-10-06 | Stop reason: SDUPTHER

## 2019-06-24 RX ORDER — ATORVASTATIN CALCIUM 80 MG/1
TABLET, FILM COATED ORAL
Qty: 90 TABLET | Refills: 0 | OUTPATIENT
Start: 2019-06-24

## 2019-06-24 RX ORDER — ATORVASTATIN CALCIUM 80 MG/1
80 TABLET, FILM COATED ORAL DAILY
Qty: 90 TABLET | Refills: 0 | Status: SHIPPED | OUTPATIENT
Start: 2019-06-24 | End: 2019-10-06 | Stop reason: SDUPTHER

## 2019-06-24 RX ORDER — OMEPRAZOLE 40 MG/1
40 CAPSULE, DELAYED RELEASE ORAL DAILY
Qty: 30 CAPSULE | Refills: 1 | Status: SHIPPED | OUTPATIENT
Start: 2019-06-24 | End: 2019-08-09 | Stop reason: SDUPTHER

## 2019-06-24 RX ORDER — FLUTICASONE PROPIONATE 50 MCG
1 SPRAY, SUSPENSION (ML) NASAL DAILY
Qty: 1 BOTTLE | Refills: 1 | Status: SHIPPED | OUTPATIENT
Start: 2019-06-24

## 2019-06-25 ENCOUNTER — APPOINTMENT (OUTPATIENT)
Dept: LAB | Facility: HOSPITAL | Age: 64
End: 2019-06-25
Payer: COMMERCIAL

## 2019-06-25 ENCOUNTER — APPOINTMENT (OUTPATIENT)
Dept: LAB | Facility: CLINIC | Age: 64
End: 2019-06-25
Payer: COMMERCIAL

## 2019-06-25 ENCOUNTER — TELEPHONE (OUTPATIENT)
Dept: FAMILY MEDICINE CLINIC | Facility: CLINIC | Age: 64
End: 2019-06-25

## 2019-06-25 LAB
ALBUMIN SERPL BCP-MCNC: 3.9 G/DL (ref 3.5–5)
ALP SERPL-CCNC: 78 U/L (ref 46–116)
ALT SERPL W P-5'-P-CCNC: 23 U/L (ref 12–78)
ANION GAP SERPL CALCULATED.3IONS-SCNC: 11 MMOL/L (ref 4–13)
AST SERPL W P-5'-P-CCNC: 19 U/L (ref 5–45)
BASOPHILS # BLD AUTO: 0.05 THOUSANDS/ΜL (ref 0–0.1)
BASOPHILS NFR BLD AUTO: 1 % (ref 0–1)
BILIRUB SERPL-MCNC: 0.58 MG/DL (ref 0.2–1)
BUN SERPL-MCNC: 14 MG/DL (ref 5–25)
CALCIUM SERPL-MCNC: 9.2 MG/DL (ref 8.3–10.1)
CHLORIDE SERPL-SCNC: 109 MMOL/L (ref 100–108)
CHOLEST SERPL-MCNC: 169 MG/DL (ref 50–200)
CO2 SERPL-SCNC: 21 MMOL/L (ref 21–32)
CREAT SERPL-MCNC: 0.78 MG/DL (ref 0.6–1.3)
EOSINOPHIL # BLD AUTO: 0.24 THOUSAND/ΜL (ref 0–0.61)
EOSINOPHIL NFR BLD AUTO: 3 % (ref 0–6)
ERYTHROCYTE [DISTWIDTH] IN BLOOD BY AUTOMATED COUNT: 13.4 % (ref 11.6–15.1)
ERYTHROCYTE [SEDIMENTATION RATE] IN BLOOD: 5 MM/HOUR (ref 0–20)
GFR SERPL CREATININE-BSD FRML MDRD: 80 ML/MIN/1.73SQ M
GLUCOSE P FAST SERPL-MCNC: 89 MG/DL (ref 65–99)
HCT VFR BLD AUTO: 42.1 % (ref 34.8–46.1)
HDLC SERPL-MCNC: 51 MG/DL (ref 40–60)
HGB BLD-MCNC: 13.2 G/DL (ref 11.5–15.4)
IMM GRANULOCYTES # BLD AUTO: 0.03 THOUSAND/UL (ref 0–0.2)
IMM GRANULOCYTES NFR BLD AUTO: 0 % (ref 0–2)
LDLC SERPL CALC-MCNC: 91 MG/DL (ref 0–100)
LYMPHOCYTES # BLD AUTO: 2.41 THOUSANDS/ΜL (ref 0.6–4.47)
LYMPHOCYTES NFR BLD AUTO: 32 % (ref 14–44)
MCH RBC QN AUTO: 28.1 PG (ref 26.8–34.3)
MCHC RBC AUTO-ENTMCNC: 31.4 G/DL (ref 31.4–37.4)
MCV RBC AUTO: 90 FL (ref 82–98)
MONOCYTES # BLD AUTO: 0.84 THOUSAND/ΜL (ref 0.17–1.22)
MONOCYTES NFR BLD AUTO: 11 % (ref 4–12)
NEUTROPHILS # BLD AUTO: 3.86 THOUSANDS/ΜL (ref 1.85–7.62)
NEUTS SEG NFR BLD AUTO: 53 % (ref 43–75)
NRBC BLD AUTO-RTO: 0 /100 WBCS
PLATELET # BLD AUTO: 159 THOUSANDS/UL (ref 149–390)
PMV BLD AUTO: 13.4 FL (ref 8.9–12.7)
POTASSIUM SERPL-SCNC: 3.9 MMOL/L (ref 3.5–5.3)
PROT SERPL-MCNC: 7.2 G/DL (ref 6.4–8.2)
RBC # BLD AUTO: 4.69 MILLION/UL (ref 3.81–5.12)
SODIUM SERPL-SCNC: 141 MMOL/L (ref 136–145)
TRIGL SERPL-MCNC: 137 MG/DL
TSH SERPL DL<=0.05 MIU/L-ACNC: 3.32 UIU/ML (ref 0.36–3.74)
WBC # BLD AUTO: 7.43 THOUSAND/UL (ref 4.31–10.16)

## 2019-06-25 PROCEDURE — 84443 ASSAY THYROID STIM HORMONE: CPT

## 2019-06-25 PROCEDURE — 36415 COLL VENOUS BLD VENIPUNCTURE: CPT

## 2019-06-25 PROCEDURE — 85025 COMPLETE CBC W/AUTO DIFF WBC: CPT

## 2019-06-25 PROCEDURE — 80061 LIPID PANEL: CPT

## 2019-06-25 PROCEDURE — 85652 RBC SED RATE AUTOMATED: CPT

## 2019-06-25 PROCEDURE — 80053 COMPREHEN METABOLIC PANEL: CPT

## 2019-07-01 ENCOUNTER — TELEPHONE (OUTPATIENT)
Dept: FAMILY MEDICINE CLINIC | Facility: CLINIC | Age: 64
End: 2019-07-01

## 2019-07-11 ENCOUNTER — OFFICE VISIT (OUTPATIENT)
Dept: FAMILY MEDICINE CLINIC | Facility: CLINIC | Age: 64
End: 2019-07-11

## 2019-07-11 VITALS
WEIGHT: 189 LBS | HEART RATE: 64 BPM | TEMPERATURE: 97 F | SYSTOLIC BLOOD PRESSURE: 112 MMHG | HEIGHT: 62 IN | RESPIRATION RATE: 18 BRPM | BODY MASS INDEX: 34.78 KG/M2 | OXYGEN SATURATION: 96 % | DIASTOLIC BLOOD PRESSURE: 80 MMHG

## 2019-07-11 DIAGNOSIS — R42 DIZZINESS: ICD-10-CM

## 2019-07-11 DIAGNOSIS — R51.9 NONINTRACTABLE EPISODIC HEADACHE, UNSPECIFIED HEADACHE TYPE: Primary | ICD-10-CM

## 2019-07-11 DIAGNOSIS — Z23 NEED FOR VACCINATION: ICD-10-CM

## 2019-07-11 PROCEDURE — 90670 PCV13 VACCINE IM: CPT | Performed by: FAMILY MEDICINE

## 2019-07-11 PROCEDURE — 90472 IMMUNIZATION ADMIN EACH ADD: CPT | Performed by: FAMILY MEDICINE

## 2019-07-11 PROCEDURE — 90471 IMMUNIZATION ADMIN: CPT | Performed by: FAMILY MEDICINE

## 2019-07-11 PROCEDURE — 3008F BODY MASS INDEX DOCD: CPT | Performed by: FAMILY MEDICINE

## 2019-07-11 PROCEDURE — 99213 OFFICE O/P EST LOW 20 MIN: CPT | Performed by: FAMILY MEDICINE

## 2019-07-11 PROCEDURE — 90715 TDAP VACCINE 7 YRS/> IM: CPT | Performed by: FAMILY MEDICINE

## 2019-07-11 RX ORDER — MECLIZINE HCL 12.5 MG/1
12.5 TABLET ORAL DAILY PRN
Qty: 10 TABLET | Refills: 0 | Status: SHIPPED | OUTPATIENT
Start: 2019-07-11 | End: 2020-03-12 | Stop reason: SDUPTHER

## 2019-07-11 NOTE — ASSESSMENT & PLAN NOTE
- Improved with meclizine prn  - Negative orthostatic  - No cervicalgia  - Likely positional  Meclizine prn  - Reviewed red flag symptoms for RTC

## 2019-07-11 NOTE — ASSESSMENT & PLAN NOTE
- CT head stat wnl  Ruled out [facility] given patient on longterm AC    - ESR wnl  Ruled out [facility]  - Improved since past visit  Less concern for migraine or tension type headaches at this point  - Likely related to her allergic symptoms given improvement with flonase and claritin   - Reviewed ER precautions for headache and when to RTC

## 2019-07-11 NOTE — PROGRESS NOTES
FAMILY PRACTICE PRE-OPERATIVE EVALUATION   [facility] PRACTICE MINAL    NAME: Ronnell Montero  AGE: 71 y o  SEX: female  : 1955     DATE: 2019    Family Practice Pre-Operative Evaluation      Chief Complaint: Pre-operative Evaluation     Surgery: dental  Anticipated Date of Surgery: routine cleaning or restorative work     History of Present Illness:     Patient has a history of CAD s/p multiple stent placements  MI in   Currently on dual antiplatelet therapy with eliquis 5 mg BID, aspirin  Chronic conditions, medications and allergies were reviewed  There is no currently active infectious process  Assessment of Cardiac Risk:  · No unstable or severe angina or MI in the last 6 weeks or history of stent placement in the last year   · No decompensated heart failure (e g  New onset heart failure, NYHA functional class IV heart failure, or worsening existing heart failure) in past 3 mos  · No severe heart valve disease including aortic stenosis or symptomatic mitral stenosis     Exercise Capacity:  · Able to walk 4 blocks without symptoms  · Able to walk 2 flights without symptoms    NO Prior Anesthesia Reactions       No prolonged steroid use in past 6 mos  P A T  If done reviewed  Review of Systems:     Review of Systems   Constitutional: Negative for fatigue and fever  Respiratory: Negative for cough and shortness of breath  Cardiovascular: Negative for chest pain and palpitations  Gastrointestinal: Negative for abdominal pain  Musculoskeletal: Negative for back pain and myalgias  Skin: Negative for rash  Neurological: Negative for dizziness and weakness         Current Problem List:     Patient Active Problem List   Diagnosis    Coronary artery disease    CAD S/P percutaneous coronary angioplasty    Hypertension, essential, benign    Atrophic vaginitis    Angina, class III (HCC)    Allergic rhinitis    Acid reflux disease    Dyslipidemia    Edema, lower extremity    Fatigue    Glaucoma, open angle    Hearing difficulty    Osteoarthritis of knee    Scoliosis    Uterovaginal prolapse, incomplete    Microscopic hematuria    Restless leg syndrome    Recurrent deep venous thrombosis (HCC)    Pain of both hip joints    Acute pain of left knee    Hypercholesterolemia    Nonintractable episodic headache    Need for vaccination       Allergies:      Allergies   Allergen Reactions    Ibuprofen        Current Medications:       Current Outpatient Medications:     acetaminophen (TYLENOL) 500 mg tablet, Take 500 mg by mouth every 6 (six) hours as needed for mild pain, Disp: , Rfl:     aluminum-magnesium hydroxide 200-200 MG/5ML suspension, Take 5 mL by mouth every 6 (six) hours as needed for heartburn, Disp: , Rfl:     aspirin 81 MG tablet, Take 1 tablet by mouth daily, Disp: , Rfl:     atorvastatin (LIPITOR) 80 mg tablet, Take 1 tablet (80 mg total) by mouth daily, Disp: 90 tablet, Rfl: 0    conjugated estrogens (PREMARIN) vaginal cream, Apply a pea size amount of the estrogen cream to the opening of the vagina three nights per week , Disp: , Rfl:     ELIQUIS 5 MG, TAKE 1 TABLET EVERY 12 HOURS, Disp: 180 tablet, Rfl: 0    fluticasone (FLONASE) 50 mcg/act nasal spray, 1 spray into each nostril daily, Disp: 1 Bottle, Rfl: 1    latanoprost (XALATAN) 0 005 % ophthalmic solution, 1 drop daily at bedtime, Disp: , Rfl:     loratadine (CLARITIN) 10 mg tablet, Take 1 tablet (10 mg total) by mouth daily, Disp: 90 tablet, Rfl: 1    losartan (COZAAR) 100 MG tablet, Take 1 tablet (100 mg total) by mouth daily, Disp: 90 tablet, Rfl: 0    meclizine (ANTIVERT) 12 5 MG tablet, Take 1 tablet (12 5 mg total) by mouth daily as needed for dizziness, Disp: 10 tablet, Rfl: 0    metoprolol tartrate (LOPRESSOR) 50 mg tablet, Take 1 tablet (50 mg total) by mouth every 12 (twelve) hours, Disp: 180 tablet, Rfl: 1    nitroglycerin (NITROSTAT) 0 4 mg SL tablet, Place 1 tablet (0 4 mg total) under the tongue every 5 (five) minutes as needed for chest pain, Disp: 90 tablet, Rfl: 0    omeprazole (PriLOSEC) 40 MG capsule, Take 1 capsule (40 mg total) by mouth daily, Disp: 30 capsule, Rfl: 1    ranolazine (RANEXA) 500 mg 12 hr tablet, Take 1 tablet (500 mg total) by mouth 2 (two) times a day, Disp: 180 tablet, Rfl: 3    Past Medical History:       Past Medical History:   Diagnosis Date    Coronary artery disease     stents placed    Depression     resolved 16    History of angioplasty     History of cardiac cath     Hyperlipidemia     Hypertension         Past Surgical History:   Procedure Laterality Date    CARDIAC SURGERY      open heart     CHOLECYSTECTOMY      CORONARY ANGIOPLASTY WITH STENT PLACEMENT      last assessed 16    EAR SURGERY      SD COLONOSCOPY FLX DX W/COLLJ SPEC WHEN PFRMD N/A 2017    Procedure: EGD AND COLONOSCOPY;  Surgeon: Alicia Raphael MD;  Location: [facility]; Service: Gastroenterology        Family History   Problem Relation Age of Onset    Diabetes Mother     Heart attack Mother         MI    Stroke Father         CVA    Hypothyroidism Sister     Heart disease Family         cardiovascular disease    Hypertension Family         Social History     Socioeconomic History    Marital status:       Spouse name: Not on file    Number of children: Not on file    Years of education: Not on file    Highest education level: Not on file   Occupational History    Not on file   Social Needs    Financial resource strain: Not on file    Food insecurity:     Worry: Not on file     Inability: Not on file    Transportation needs:     Medical: Not on file     Non-medical: Not on file   Tobacco Use    Smoking status: Former Smoker     Last attempt to quit: 1998     Years since quittin 0    Smokeless tobacco: Never Used   Substance and Sexual Activity    Alcohol use: Not Currently    Drug use: Never    Sexual activity: Not on file   Lifestyle    Physical activity:     Days per week: Not on file     Minutes per session: Not on file    Stress: Not on file   Relationships    Social connections:     Talks on phone: Not on file     Gets together: Not on file     Attends [employer] service: Not on file     Active member of club or organization: Not on file     Attends meetings of clubs or organizations: Not on file     Relationship status: Not on file    Intimate partner violence:     Fear of current or ex partner: Not on file     Emotionally abused: Not on file     Physically abused: Not on file     Forced sexual activity: Not on file   Other Topics Concern    Not on file   Social History Narrative    Lives with adult children    [employer]        Physical Exam:     /70 (BP Location: Left arm, Patient Position: Sitting, Cuff Size: Large)   Pulse 99   Temp (!) 97 3 °F (36 3 °C) (Tympanic)   Resp 16   Ht 5' 2" (1 575 m)   Wt 85 7 kg (189 lb)   SpO2 98%   BMI 34 57 kg/m²     Physical Exam   Constitutional: She is oriented to person, place, and time  She appears well-developed and well-nourished  No distress  HENT:   Head: Normocephalic and atraumatic  Eyes: Pupils are equal, round, and reactive to light  Conjunctivae and EOM are normal  No scleral icterus  Neck: Normal range of motion  Neck supple  Cardiovascular: Normal rate, regular rhythm and normal heart sounds  Exam reveals no friction rub  No murmur heard  Pulmonary/Chest: Effort normal and breath sounds normal  No respiratory distress  She has no wheezes  Abdominal: Soft  Bowel sounds are normal  There is no tenderness  Musculoskeletal: She exhibits no deformity  Lymphadenopathy:     She has no cervical adenopathy  Neurological: She is alert and oriented to person, place, and time  No cranial nerve deficit  Skin: Skin is warm and dry  No rash noted  Psychiatric: She has a normal mood and affect         Operative site has been examined and clear of skin infection and inflammation  Assessment & Recommendations:     Surgical Procedure risk category: low    Patient specific operative risk categegory: moderate    Patient is taking eliquis 5 mg BID and aspirin  Laboratory studies review and are within normal limits  She will be evaluated by cardiology for cardiac clearance  Cardiology will make recommendations regarding dual antiplatelet therapy        Yahaira Lindo MD   Family Medicine, PGY-2  7/12/2019   2:18 PM

## 2019-07-11 NOTE — ASSESSMENT & PLAN NOTE
-Tdap and PCV 13 administered  - Expect injection site discomfort  Can use ice packs   - Can expect mild febrile response   May take tylenol    - Pneumo 23 in one year

## 2019-07-11 NOTE — PROGRESS NOTES
Assessment/Plan:    Nonintractable episodic headache  - CT head stat wnl  Ruled out [facility] given patient on longterm AC    - ESR wnl  Ruled out [facility]  - Improved since past visit  Less concern for migraine or tension type headaches at this point  - Likely related to her allergic symptoms given improvement with flonase and claritin   - Reviewed ER precautions for headache and when to RTC  Dizziness  - Improved with meclizine prn  - Negative orthostatic  - No cervicalgia  - Likely positional  Meclizine prn  - Reviewed red flag symptoms for RTC     Need for vaccination  -Tdap and PCV 13 administered  - Expect injection site discomfort  Can use ice packs   - Can expect mild febrile response  May take tylenol    - Pneumo 23 in one year       Diagnoses and all orders for this visit:    Nonintractable episodic headache, unspecified headache type    Dizziness  -     meclizine (ANTIVERT) 12 5 MG tablet; Take 1 tablet (12 5 mg total) by mouth daily as needed for dizziness    Need for vaccination  -     TDAP VACCINE GREATER THAN OR EQUAL TO 8YO IM  -     PNEUMOCOCCAL CONJUGATE VACCINE 13-VALENT GREATER THAN 6 MONTHS    Other orders  -     Cancel: TDAP VACCINE GREATER THAN OR EQUAL TO 8YO IM  -     Cancel: PNEUMOCOCCAL CONJUGATE VACCINE 13-VALENT GREATER THAN 6 MONTHS          Subjective:      Patient ID: Batsheva Cedillo is a 71 y o  female  71 y o female presents for f/u of her headache and dizziness  Patient on long term anticoagulation with Eliquis with new onset headache > 50 that was progressively worsening  CT stat was done which ruled out an intracranial bleed  Family Hx of migraines  Triptans contraindicated due to her chest pain  After discussion had continued tylenol prn along with lifestyle modifications for headache  Was placed on flonase and continued H2 blocker for allergy  At this visit reports improvement  Last headache episode was about 1 week back, diffuse but of lesser intensity   No vision change/ weakness/ nausea/ vomiting/ seizures  Her dizziness has now resolved  Improved with meclizine  No [place]/ ear pain/ discharge/ neck pain  The following portions of the patient's history were reviewed and updated as appropriate: She  has a past medical history of Coronary artery disease, Depression, History of angioplasty, History of cardiac cath (2003), Hyperlipidemia, and Hypertension  Patient Active Problem List    Diagnosis Date Noted    Need for vaccination 07/11/2019    Nonintractable episodic headache 06/24/2019    Pain of both hip joints 12/10/2018    Acute pain of left knee 12/10/2018    Hypercholesterolemia 12/10/2018    Recurrent deep venous thrombosis ([facility] [address] ) 04/20/2018    Restless leg syndrome 02/08/2018    Allergic rhinitis 09/30/2016    Microscopic hematuria 09/13/2016    Atrophic vaginitis 08/23/2016    Uterovaginal prolapse, incomplete 08/23/2016    Fatigue 06/29/2016    Edema, lower extremity 08/04/2015    Glaucoma, open angle 05/28/2015    Hearing difficulty 05/28/2015    CAD S/P percutaneous coronary angioplasty 05/26/2015    Angina, class III ([facility] [address] ) 05/26/2015    Coronary artery disease 05/19/2015    Hypertension, essential, benign 05/19/2015    Acid reflux disease 05/19/2015    Dyslipidemia 05/19/2015    Osteoarthritis of knee 05/19/2015    Scoliosis 05/19/2015     She  has a past surgical history that includes Cardiac surgery (2000); Cholecystectomy; EAR SURGERY (1984); pr colonoscopy flx dx w/collj spec when pfrmd (N/A, 6/22/2017); and Coronary angioplasty with stent  Her family history includes Diabetes in her mother; Heart attack in her mother; Heart disease in her family; Hypertension in her family; Hypothyroidism in her sister; Stroke in her father  She  reports that she has quit smoking  She has never used smokeless tobacco  She reports that she does not drink alcohol or use drugs    Current Outpatient Medications   Medication Sig Dispense Refill    acetaminophen (TYLENOL) 500 mg tablet Take 500 mg by mouth every 6 (six) hours as needed for mild pain      aluminum-magnesium hydroxide 200-200 MG/5ML suspension Take 5 mL by mouth every 6 (six) hours as needed for heartburn      aspirin 81 MG tablet Take 1 tablet by mouth daily      atorvastatin (LIPITOR) 80 mg tablet Take 1 tablet (80 mg total) by mouth daily 90 tablet 0    conjugated estrogens (PREMARIN) vaginal cream Apply a pea size amount of the estrogen cream to the opening of the vagina three nights per week   ELIQUIS 5 MG TAKE 1 TABLET EVERY 12 HOURS 180 tablet 0    fluticasone (FLONASE) 50 mcg/act nasal spray 1 spray into each nostril daily 1 Bottle 1    isosorbide mononitrate (IMDUR) 30 mg 24 hr tablet Take 1 tablet (30 mg total) by mouth daily 90 tablet 0    latanoprost (XALATAN) 0 005 % ophthalmic solution 1 drop daily at bedtime      loratadine (CLARITIN) 10 mg tablet Take 1 tablet (10 mg total) by mouth daily 90 tablet 1    meclizine (ANTIVERT) 12 5 MG tablet Take 1 tablet (12 5 mg total) by mouth daily as needed for dizziness 10 tablet 0    metoprolol tartrate (LOPRESSOR) 50 mg tablet Take 1 tablet (50 mg total) by mouth every 12 (twelve) hours 180 tablet 1    nitroglycerin (NITROSTAT) 0 4 mg SL tablet Place 1 tablet (0 4 mg total) under the tongue every 5 (five) minutes as needed for chest pain 90 tablet 0    omeprazole (PriLOSEC) 40 MG capsule Take 1 capsule (40 mg total) by mouth daily 30 capsule 1    ranolazine (RANEXA) 500 mg 12 hr tablet Take 1 tablet (500 mg total) by mouth 2 (two) times a day 180 tablet 3    losartan (COZAAR) 100 MG tablet Take 1 tablet (100 mg total) by mouth daily 90 tablet 0    meloxicam (MOBIC) 7 5 mg tablet Take 1 tablet (7 5 mg total) by mouth daily (Patient not taking: Reported on 6/24/2019) 90 tablet 0     No current facility-administered medications for this visit        Current Outpatient Medications on File Prior to Visit   Medication Sig    acetaminophen (TYLENOL) 500 mg tablet Take 500 mg by mouth every 6 (six) hours as needed for mild pain    aluminum-magnesium hydroxide 200-200 MG/5ML suspension Take 5 mL by mouth every 6 (six) hours as needed for heartburn    aspirin 81 MG tablet Take 1 tablet by mouth daily    atorvastatin (LIPITOR) 80 mg tablet Take 1 tablet (80 mg total) by mouth daily    conjugated estrogens (PREMARIN) vaginal cream Apply a pea size amount of the estrogen cream to the opening of the vagina three nights per week   ELIQUIS 5 MG TAKE 1 TABLET EVERY 12 HOURS    fluticasone (FLONASE) 50 mcg/act nasal spray 1 spray into each nostril daily    isosorbide mononitrate (IMDUR) 30 mg 24 hr tablet Take 1 tablet (30 mg total) by mouth daily    latanoprost (XALATAN) 0 005 % ophthalmic solution 1 drop daily at bedtime    loratadine (CLARITIN) 10 mg tablet Take 1 tablet (10 mg total) by mouth daily    metoprolol tartrate (LOPRESSOR) 50 mg tablet Take 1 tablet (50 mg total) by mouth every 12 (twelve) hours    nitroglycerin (NITROSTAT) 0 4 mg SL tablet Place 1 tablet (0 4 mg total) under the tongue every 5 (five) minutes as needed for chest pain    omeprazole (PriLOSEC) 40 MG capsule Take 1 capsule (40 mg total) by mouth daily    ranolazine (RANEXA) 500 mg 12 hr tablet Take 1 tablet (500 mg total) by mouth 2 (two) times a day    [DISCONTINUED] meclizine (ANTIVERT) 12 5 MG tablet Take 1 tablet (12 5 mg total) by mouth daily as needed for dizziness    losartan (COZAAR) 100 MG tablet Take 1 tablet (100 mg total) by mouth daily    meloxicam (MOBIC) 7 5 mg tablet Take 1 tablet (7 5 mg total) by mouth daily (Patient not taking: Reported on 6/24/2019)     No current facility-administered medications on file prior to visit  She is allergic to ibuprofen       Review of Systems   Constitutional: Negative for chills and fever  HENT: Negative for congestion, rhinorrhea, sinus pain, sore throat and trouble swallowing      Eyes: Negative for visual disturbance  Respiratory: Negative for cough, shortness of breath and wheezing  Cardiovascular: Negative for chest pain, palpitations and leg swelling  Gastrointestinal: Negative for abdominal pain, blood in stool, diarrhea, nausea and vomiting  Genitourinary: Negative for hematuria  Musculoskeletal: Negative for gait problem  Neurological: Negative for dizziness, tremors, seizures, syncope, facial asymmetry, speech difficulty, weakness, light-headedness, numbness and headaches  Psychiatric/Behavioral: The patient is not nervous/anxious  Objective:      /80 (BP Location: Left arm, Patient Position: Sitting, Cuff Size: Large)   Pulse 64   Temp (!) 97 °F (36 1 °C) (Temporal)   Resp 18   Ht 5' 2" (1 575 m)   Wt 85 7 kg (189 lb)   SpO2 96%   Breastfeeding? No   BMI 34 57 kg/m²          Physical Exam   Constitutional: She is oriented to person, place, and time  She appears well-developed and well-nourished  No distress  HENT:   Head: Normocephalic and atraumatic  Mouth/Throat: Oropharynx is clear and moist  No oropharyngeal exudate  Eyes: Pupils are equal, round, and reactive to light  Conjunctivae and EOM are normal  Right eye exhibits no discharge  Left eye exhibits no discharge  No scleral icterus  Neck: Normal range of motion  Cardiovascular: Normal rate, regular rhythm and normal heart sounds  Exam reveals no gallop and no friction rub  No murmur heard  Pulmonary/Chest: Effort normal and breath sounds normal  No stridor  No respiratory distress  She has no wheezes  She has no rales  Abdominal: Soft  She exhibits no distension  There is no tenderness  There is no rebound and no guarding  Musculoskeletal: She exhibits no edema or tenderness  Cervical back: She exhibits normal range of motion and no tenderness  Lymphadenopathy:     She has no cervical adenopathy  Neurological: She is alert and oriented to person, place, and time   She has normal strength  She displays no tremor  No cranial nerve deficit or sensory deficit  She exhibits normal muscle tone  Gait normal    Reflex Scores:       Tricep reflexes are 2+ on the right side and 2+ on the left side  Bicep reflexes are 2+ on the right side and 2+ on the left side  Brachioradialis reflexes are 2+ on the right side and 2+ on the left side  Patellar reflexes are 2+ on the right side and 2+ on the left side  Achilles reflexes are 2+ on the right side and 2+ on the left side  No papilledema on opthalmoscopic exam    Skin: Skin is warm  She is not diaphoretic  Psychiatric: She has a normal mood and affect  Vitals reviewed

## 2019-07-12 ENCOUNTER — CONSULT (OUTPATIENT)
Dept: FAMILY MEDICINE CLINIC | Facility: CLINIC | Age: 64
End: 2019-07-12

## 2019-07-12 VITALS
TEMPERATURE: 97.3 F | BODY MASS INDEX: 34.78 KG/M2 | WEIGHT: 189 LBS | HEART RATE: 99 BPM | HEIGHT: 62 IN | OXYGEN SATURATION: 98 % | DIASTOLIC BLOOD PRESSURE: 70 MMHG | SYSTOLIC BLOOD PRESSURE: 124 MMHG | RESPIRATION RATE: 16 BRPM

## 2019-07-12 DIAGNOSIS — Z01.818 PRE-OP EXAMINATION: Primary | ICD-10-CM

## 2019-07-12 DIAGNOSIS — K02.9 DENTAL CARIES: ICD-10-CM

## 2019-07-12 PROCEDURE — 99213 OFFICE O/P EST LOW 20 MIN: CPT | Performed by: FAMILY MEDICINE

## 2019-07-12 PROCEDURE — 4040F PNEUMOC VAC/ADMIN/RCVD: CPT | Performed by: FAMILY MEDICINE

## 2019-07-16 DIAGNOSIS — I82.409 RECURRENT DEEP VENOUS THROMBOSIS (HCC): ICD-10-CM

## 2019-07-16 RX ORDER — APIXABAN 5 MG/1
TABLET, FILM COATED ORAL
Qty: 180 TABLET | Refills: 0 | Status: SHIPPED | OUTPATIENT
Start: 2019-07-16 | End: 2019-11-26 | Stop reason: SDUPTHER

## 2019-07-17 ENCOUNTER — TELEPHONE (OUTPATIENT)
Dept: FAMILY MEDICINE CLINIC | Facility: CLINIC | Age: 64
End: 2019-07-17

## 2019-07-17 DIAGNOSIS — H40.9 GLAUCOMA, UNSPECIFIED GLAUCOMA TYPE, UNSPECIFIED LATERALITY: Primary | ICD-10-CM

## 2019-07-17 PROBLEM — K02.9 DENTAL CARIES: Status: ACTIVE | Noted: 2019-07-17

## 2019-07-17 NOTE — TELEPHONE ENCOUNTER
Spoke with patient's daughter    Patient follows with Dr Juan Cruz for glaucoma   Referral placed at request

## 2019-08-08 DIAGNOSIS — K21.9 GASTROESOPHAGEAL REFLUX DISEASE, ESOPHAGITIS PRESENCE NOT SPECIFIED: ICD-10-CM

## 2019-08-09 RX ORDER — OMEPRAZOLE 40 MG/1
CAPSULE, DELAYED RELEASE ORAL
Qty: 30 CAPSULE | Refills: 1 | Status: SHIPPED | OUTPATIENT
Start: 2019-08-09 | End: 2019-11-26 | Stop reason: SDUPTHER

## 2019-10-06 DIAGNOSIS — I10 BENIGN ESSENTIAL HYPERTENSION: ICD-10-CM

## 2019-10-06 DIAGNOSIS — I20.9 ANGINA, CLASS III (HCC): ICD-10-CM

## 2019-10-06 DIAGNOSIS — J30.1 ALLERGIC RHINITIS DUE TO POLLEN, UNSPECIFIED SEASONALITY: ICD-10-CM

## 2019-10-06 DIAGNOSIS — E78.5 HYPERLIPIDEMIA, UNSPECIFIED HYPERLIPIDEMIA TYPE: ICD-10-CM

## 2019-10-07 RX ORDER — LOSARTAN POTASSIUM 100 MG/1
TABLET ORAL
Qty: 90 TABLET | Refills: 0 | Status: SHIPPED | OUTPATIENT
Start: 2019-10-07 | End: 2020-02-17

## 2019-10-07 RX ORDER — ATORVASTATIN CALCIUM 80 MG/1
TABLET, FILM COATED ORAL
Qty: 90 TABLET | Refills: 0 | Status: SHIPPED | OUTPATIENT
Start: 2019-10-07 | End: 2020-02-17

## 2019-10-10 RX ORDER — METOPROLOL TARTRATE 50 MG/1
TABLET, FILM COATED ORAL
Qty: 180 TABLET | Refills: 1 | Status: SHIPPED | OUTPATIENT
Start: 2019-10-10 | End: 2020-03-31 | Stop reason: SDUPTHER

## 2019-10-10 RX ORDER — ALCOHOL 62 ML/100ML
LIQUID TOPICAL
Qty: 90 TABLET | Refills: 1 | Status: SHIPPED | OUTPATIENT
Start: 2019-10-10 | End: 2020-08-03

## 2019-11-26 DIAGNOSIS — K21.9 GASTROESOPHAGEAL REFLUX DISEASE, ESOPHAGITIS PRESENCE NOT SPECIFIED: ICD-10-CM

## 2019-11-26 DIAGNOSIS — I82.409 RECURRENT DEEP VENOUS THROMBOSIS (HCC): ICD-10-CM

## 2019-11-26 RX ORDER — OMEPRAZOLE 40 MG/1
CAPSULE, DELAYED RELEASE ORAL
Qty: 30 CAPSULE | Refills: 1 | Status: SHIPPED | OUTPATIENT
Start: 2019-11-26 | End: 2020-03-26

## 2019-11-27 RX ORDER — APIXABAN 5 MG/1
TABLET, FILM COATED ORAL
Qty: 180 TABLET | Refills: 0 | Status: SHIPPED | OUTPATIENT
Start: 2019-11-27 | End: 2020-05-11 | Stop reason: SDUPTHER

## 2020-02-14 ENCOUNTER — APPOINTMENT (OUTPATIENT)
Dept: LAB | Facility: CLINIC | Age: 65
End: 2020-02-14
Payer: COMMERCIAL

## 2020-02-14 ENCOUNTER — TRANSCRIBE ORDERS (OUTPATIENT)
Dept: LAB | Facility: CLINIC | Age: 65
End: 2020-02-14

## 2020-02-14 DIAGNOSIS — R31.29 MICROSCOPIC HEMATURIA: Primary | ICD-10-CM

## 2020-02-14 LAB
BACTERIA UR QL AUTO: ABNORMAL /HPF
BILIRUB UR QL STRIP: NEGATIVE
CLARITY UR: ABNORMAL
COLOR UR: YELLOW
GLUCOSE UR STRIP-MCNC: NEGATIVE MG/DL
HGB UR QL STRIP.AUTO: NEGATIVE
HYALINE CASTS #/AREA URNS LPF: ABNORMAL /LPF
KETONES UR STRIP-MCNC: NEGATIVE MG/DL
LEUKOCYTE ESTERASE UR QL STRIP: ABNORMAL
NITRITE UR QL STRIP: NEGATIVE
NON-SQ EPI CELLS URNS QL MICRO: ABNORMAL /HPF
PH UR STRIP.AUTO: 6 [PH]
PROT UR STRIP-MCNC: NEGATIVE MG/DL
RBC #/AREA URNS AUTO: ABNORMAL /HPF
SP GR UR STRIP.AUTO: 1.02 (ref 1–1.03)
UROBILINOGEN UR QL STRIP.AUTO: 0.2 E.U./DL
WBC #/AREA URNS AUTO: ABNORMAL /HPF

## 2020-02-14 PROCEDURE — 81001 URINALYSIS AUTO W/SCOPE: CPT

## 2020-02-15 DIAGNOSIS — I10 BENIGN ESSENTIAL HYPERTENSION: ICD-10-CM

## 2020-02-17 ENCOUNTER — TELEPHONE (OUTPATIENT)
Dept: CARDIOLOGY CLINIC | Facility: CLINIC | Age: 65
End: 2020-02-17

## 2020-02-17 DIAGNOSIS — E78.5 HYPERLIPIDEMIA, UNSPECIFIED HYPERLIPIDEMIA TYPE: ICD-10-CM

## 2020-02-17 DIAGNOSIS — I25.118 CORONARY ARTERY DISEASE INVOLVING NATIVE HEART WITH OTHER FORM OF ANGINA PECTORIS, UNSPECIFIED VESSEL OR LESION TYPE (HCC): ICD-10-CM

## 2020-02-17 RX ORDER — ATORVASTATIN CALCIUM 80 MG/1
TABLET, FILM COATED ORAL
Qty: 90 TABLET | Refills: 0 | Status: SHIPPED | OUTPATIENT
Start: 2020-02-17 | End: 2020-06-07 | Stop reason: SDUPTHER

## 2020-02-17 RX ORDER — LOSARTAN POTASSIUM 100 MG/1
TABLET ORAL
Qty: 90 TABLET | Refills: 0 | Status: SHIPPED | OUTPATIENT
Start: 2020-02-17 | End: 2020-03-26 | Stop reason: SDUPTHER

## 2020-02-17 RX ORDER — RANOLAZINE 500 MG/1
500 TABLET, EXTENDED RELEASE ORAL 2 TIMES DAILY
Qty: 60 TABLET | Refills: 0 | Status: SHIPPED | OUTPATIENT
Start: 2020-02-17 | End: 2020-03-27

## 2020-03-08 ENCOUNTER — HOSPITAL ENCOUNTER (EMERGENCY)
Facility: HOSPITAL | Age: 65
Discharge: HOME | End: 2020-03-08
Attending: EMERGENCY MEDICINE
Payer: COMMERCIAL

## 2020-03-08 ENCOUNTER — APPOINTMENT (EMERGENCY)
Dept: RADIOLOGY | Facility: HOSPITAL | Age: 65
End: 2020-03-08
Payer: COMMERCIAL

## 2020-03-08 VITALS
TEMPERATURE: 98.2 F | SYSTOLIC BLOOD PRESSURE: 121 MMHG | RESPIRATION RATE: 18 BRPM | HEART RATE: 59 BPM | DIASTOLIC BLOOD PRESSURE: 103 MMHG | OXYGEN SATURATION: 98 %

## 2020-03-08 DIAGNOSIS — E04.1 THYROID NODULE: ICD-10-CM

## 2020-03-08 DIAGNOSIS — R42 DIZZINESS: Primary | ICD-10-CM

## 2020-03-08 LAB
ANION GAP SERPL CALCULATED.3IONS-SCNC: 5 MMOL/L (ref 4–13)
BASOPHILS # BLD AUTO: 0.06 THOUSANDS/ΜL (ref 0–0.1)
BASOPHILS NFR BLD AUTO: 1 % (ref 0–1)
BUN SERPL-MCNC: 15 MG/DL (ref 5–25)
CALCIUM SERPL-MCNC: 9.2 MG/DL (ref 8.3–10.1)
CHLORIDE SERPL-SCNC: 105 MMOL/L (ref 100–108)
CO2 SERPL-SCNC: 27 MMOL/L (ref 21–32)
CREAT SERPL-MCNC: 0.76 MG/DL (ref 0.6–1.3)
EOSINOPHIL # BLD AUTO: 0.31 THOUSAND/ΜL (ref 0–0.61)
EOSINOPHIL NFR BLD AUTO: 4 % (ref 0–6)
ERYTHROCYTE [DISTWIDTH] IN BLOOD BY AUTOMATED COUNT: 14.7 % (ref 11.6–15.1)
GFR SERPL CREATININE-BSD FRML MDRD: 82 ML/MIN/1.73SQ M
GLUCOSE SERPL-MCNC: 132 MG/DL (ref 65–140)
HCT VFR BLD AUTO: 43.5 % (ref 34.8–46.1)
HGB BLD-MCNC: 14 G/DL (ref 11.5–15.4)
IMM GRANULOCYTES # BLD AUTO: 0.03 THOUSAND/UL (ref 0–0.2)
IMM GRANULOCYTES NFR BLD AUTO: 0 % (ref 0–2)
LYMPHOCYTES # BLD AUTO: 1.8 THOUSANDS/ΜL (ref 0.6–4.47)
LYMPHOCYTES NFR BLD AUTO: 21 % (ref 14–44)
MCH RBC QN AUTO: 27.1 PG (ref 26.8–34.3)
MCHC RBC AUTO-ENTMCNC: 32.2 G/DL (ref 31.4–37.4)
MCV RBC AUTO: 84 FL (ref 82–98)
MONOCYTES # BLD AUTO: 0.69 THOUSAND/ΜL (ref 0.17–1.22)
MONOCYTES NFR BLD AUTO: 8 % (ref 4–12)
NEUTROPHILS # BLD AUTO: 5.69 THOUSANDS/ΜL (ref 1.85–7.62)
NEUTS SEG NFR BLD AUTO: 66 % (ref 43–75)
NRBC BLD AUTO-RTO: 0 /100 WBCS
PLATELET # BLD AUTO: 146 THOUSANDS/UL (ref 149–390)
PMV BLD AUTO: 13.2 FL (ref 8.9–12.7)
POTASSIUM SERPL-SCNC: 4.3 MMOL/L (ref 3.5–5.3)
RBC # BLD AUTO: 5.17 MILLION/UL (ref 3.81–5.12)
SODIUM SERPL-SCNC: 137 MMOL/L (ref 136–145)
TROPONIN I SERPL-MCNC: <0.02 NG/ML
WBC # BLD AUTO: 8.58 THOUSAND/UL (ref 4.31–10.16)

## 2020-03-08 PROCEDURE — 70498 CT ANGIOGRAPHY NECK: CPT

## 2020-03-08 PROCEDURE — 70496 CT ANGIOGRAPHY HEAD: CPT

## 2020-03-08 PROCEDURE — 85025 COMPLETE CBC W/AUTO DIFF WBC: CPT | Performed by: EMERGENCY MEDICINE

## 2020-03-08 PROCEDURE — 99285 EMERGENCY DEPT VISIT HI MDM: CPT

## 2020-03-08 PROCEDURE — 96374 THER/PROPH/DIAG INJ IV PUSH: CPT

## 2020-03-08 PROCEDURE — 36415 COLL VENOUS BLD VENIPUNCTURE: CPT | Performed by: EMERGENCY MEDICINE

## 2020-03-08 PROCEDURE — 99284 EMERGENCY DEPT VISIT MOD MDM: CPT | Performed by: EMERGENCY MEDICINE

## 2020-03-08 PROCEDURE — 80048 BASIC METABOLIC PNL TOTAL CA: CPT | Performed by: EMERGENCY MEDICINE

## 2020-03-08 PROCEDURE — 93005 ELECTROCARDIOGRAM TRACING: CPT

## 2020-03-08 PROCEDURE — 84484 ASSAY OF TROPONIN QUANT: CPT | Performed by: EMERGENCY MEDICINE

## 2020-03-08 RX ORDER — MECLIZINE HYDROCHLORIDE 25 MG/1
25 TABLET ORAL ONCE
Status: COMPLETED | OUTPATIENT
Start: 2020-03-08 | End: 2020-03-08

## 2020-03-08 RX ORDER — MECLIZINE HCL 12.5 MG/1
12.5 TABLET ORAL EVERY 8 HOURS PRN
Qty: 20 TABLET | Refills: 0 | Status: SHIPPED | OUTPATIENT
Start: 2020-03-08

## 2020-03-08 RX ORDER — SODIUM CHLORIDE 9 MG/ML
3 INJECTION INTRAVENOUS AS NEEDED
Status: DISCONTINUED | OUTPATIENT
Start: 2020-03-08 | End: 2020-03-08 | Stop reason: HOSPADM

## 2020-03-08 RX ORDER — ONDANSETRON 2 MG/ML
4 INJECTION INTRAMUSCULAR; INTRAVENOUS ONCE
Status: COMPLETED | OUTPATIENT
Start: 2020-03-08 | End: 2020-03-08

## 2020-03-08 RX ADMIN — ONDANSETRON 4 MG: 2 INJECTION INTRAMUSCULAR; INTRAVENOUS at 16:41

## 2020-03-08 RX ADMIN — MECLIZINE HYDROCHLORIDE 25 MG: 25 TABLET ORAL at 16:41

## 2020-03-08 RX ADMIN — IOHEXOL 85 ML: 350 INJECTION, SOLUTION INTRAVENOUS at 17:35

## 2020-03-08 NOTE — ED ATTENDING ATTESTATION
Darrius Dunham DO, saw and evaluated the patient  I have discussed the patient with the resident/non-physician practitioner and agree with the resident's/non-physician practitioner's findings, Plan of Care, and MDM as documented in the resident's/non-physician practitioner's note, except where noted  All available labs and Radiology studies were reviewed  At this point I agree with the current assessment done in the Emergency Department  I have conducted an independent evaluation of this patient including a focused history and a physical exam     ED Note - Casi Castillo 76 y o  female MRN: [id number]  Unit/Bed#: ED 05 Encounter: [id number]    History of Present Illness   HPI  Casi Castillo is a 76 y o  female who presents for evaluation of acute onset dizziness associated with nausea and gait imbalance  No recent illness  Transient hypertension  No headache  No neck pain  No visual changes  No speech deficits  On Eliquis  REVIEW OF SYSTEMS  See HPI for further details  12 systems reviewed and otherwise negative except as noted  Historical Information     PAST MEDICAL HISTORY  Past Medical History:   Diagnosis Date    [facility])     Heart attack ([address] )     HLD (hyperlipidemia)     Hypertension        FAMILY HISTORY  History reviewed  No pertinent family history      SOCIAL HISTORY  Social History     Socioeconomic History    Marital status: /Civil Union     Spouse name: None    Number of children: None    Years of education: None    Highest education level: None   Occupational History    None   Social Needs    Financial resource strain: None    Food insecurity:     Worry: None     Inability: None    Transportation needs:     Medical: None     Non-medical: None   Tobacco Use    Smoking status: Never Smoker    Smokeless tobacco: Never Used   Substance and Sexual Activity    Alcohol use: Never     Frequency: Never    Drug use: Never    Sexual activity: None Lifestyle    Physical activity:     Days per week: None     Minutes per session: None    Stress: None   Relationships    Social connections:     Talks on phone: None     Gets together: None     Attends [employer] service: None     Active member of club or organization: None     Attends meetings of clubs or organizations: None     Relationship status: None    Intimate partner violence:     Fear of current or ex partner: None     Emotionally abused: None     Physically abused: None     Forced sexual activity: None   Other Topics Concern    None   Social History Narrative    None       SURGICAL HISTORY  Past Surgical History:   Procedure Laterality Date    CARDIAC CATHETERIZATION      CORONARY ANGIOPLASTY WITH STENT PLACEMENT       Meds/Allergies     CURRENT MEDICATIONS    Current Facility-Administered Medications:     Insert peripheral IV, , , Once **AND** sodium chloride (PF) 0 9 % injection 3 mL, 3 mL, Intravenous, PRN, Tr Jain, DO  No current outpatient medications on file  (Not in a hospital admission)    ALLERGIES  Allergies   Allergen Reactions    Ibuprofen      Objective     PHYSICAL EXAM    VITAL SIGNS: Blood pressure (!) 121/103, pulse 59, temperature 98 2 °F (36 8 °C), resp  rate 18, SpO2 98 %      Constitutional:  Appears well developed and well nourished, no acute distress, non-toxic appearance   Eyes:  PERRL, EOMI, conjunctivae pink, sclerae non-icteric, no nystagmus    HENT:  Normocephalic/Atraumatic, no rhinorrhea, mucous membranes moist   Neck: normal range of motion, no tenderness, supple   Respiratory:  No respiratory distress, normal breath sounds   Cardiovascular:  Normal rate, normal rhythm    GI:  Soft, non-tender, non-distended  :  No CVAT, no flank ecchymosis  Musculoskeletal:  No swelling or edema, no tenderness, no deformities  Integument:  Pink, warm, dry, Well hydrated, no rash, no erythema, no bullae   Lymphatic:  No cervical/ tonsillar/ submandibular lymphadenopathy noted   Neurologic:  Awake, Alert & oriented x 3, CN 2-12 intact, no focal neurological deficits, motor function intact, finger-to-nose intact  Psychiatric:  Speech and behavior appropriate       ED COURSE and MDM:    Assessment/Plan   Assessment:  Fady Haq is a 76 y o  female presents for evaluation of acute onset dizziness, nausea  Plan:  Labs, symptom management, CTA brain and neck, disposition as appropriate  CRITICAL CARE TIME: 0 minutes      Portions of the record may have been created with voice recognition software  Occasional wrong word or "sound a like" substitutions may have occurred due to the inherent limitations of voice recognition software       ED Provider  Electronically Signed by

## 2020-03-08 NOTE — DISCHARGE INSTRUCTIONS
Please call your primary care provider to follow-up as soon as possible  Come back to the emergency department if you have new or worsening dizziness  You were found to have a thyroid nodule on CT scan today  Non emergent ultrasound is recommended for follow-up of your nodule

## 2020-03-08 NOTE — ED PROVIDER NOTES
History  Chief Complaint   Patient presents with    Dizziness     reports suddent onset of dizziness and weakness 1 hour ago, pt slow to respond in triage, when attempting to ask questions pt starts to cry and states "she does not want to die and thats why shes not talking "    Weakness - Generalized     71-year-old female with previous medical history of hypertension, CABG, DVT currently on Eliquis presenting emergency department with acute onset of disequilibrium and nausea approximately 1 hour ago  Patient states that she was sitting down for lunch when she had acute onset of the symptoms  Patient has had dizziness in the past that she is on meclizine for  Patient says this dizziness is worse than her normal type  Patient denies headache, neurological deficits, neck pain, neck stiffness, vomiting, change in vision, change in hearing, numbness, tingling, weakness  Patient states that she feels drunk  Patient denies any recent illnesses, shortness breath, fevers, chest pain  Dizziness is not worse with moving her head, standing, walking  Dizziness   Quality:  Imbalance  Severity:  Severe  Onset quality:  Sudden  Timing:  Constant  Progression:  Unchanged  Relieved by:  Nothing  Worsened by:  Nothing  Ineffective treatments:  None tried  Associated symptoms: nausea        None       Past Medical History:   Diagnosis Date    [facility] rest [facility] [facility]     Heart attack ([facility] [address] [facility]     HLD (hyperlipidemia)     Hypertension        Past Surgical History:   Procedure Laterality Date    CARDIAC CATHETERIZATION      CORONARY ANGIOPLASTY WITH STENT PLACEMENT         History reviewed  No pertinent family history  I have reviewed and agree with the history as documented      E-Cigarette/Vaping    E-Cigarette Use Never User      E-Cigarette/Vaping Substances     Social History     Tobacco Use    Smoking status: Never Smoker    Smokeless tobacco: Never Used   Substance Use Topics    Alcohol use: Never Frequency: Never    Drug use: Never        Review of Systems   Gastrointestinal: Positive for nausea  Neurological: Positive for dizziness  All other systems reviewed and are negative  Physical Exam  ED Triage Vitals [03/08/20 1537]   Temperature Pulse Respirations Blood Pressure SpO2   98 2 °F (36 8 °C) 59 18 (!) 121/103 98 %      Temp src Heart Rate Source Patient Position - Orthostatic VS BP Location FiO2 (%)   -- -- -- -- --      Pain Score       No Pain             Orthostatic Vital Signs  Vitals:    03/08/20 1537   BP: (!) 121/103   Pulse: 59       Physical Exam   Constitutional: She appears well-developed and well-nourished  No distress  HENT:   Head: Normocephalic and atraumatic  Right Ear: External ear normal    Left Ear: External ear normal    Eyes: Conjunctivae and EOM are normal    Neck: No JVD present  No tracheal deviation present  Cardiovascular: Normal rate, regular rhythm, normal heart sounds and intact distal pulses  No murmur heard  Pulmonary/Chest: Breath sounds normal  No stridor  No respiratory distress  She has no wheezes  She has no rales  Abdominal: Soft  Bowel sounds are normal  She exhibits no distension and no mass  There is no tenderness  There is no rebound and no guarding  Genitourinary:   Genitourinary Comments: Deferred   Musculoskeletal: She exhibits no edema, tenderness or deformity  Neurological: She exhibits normal muscle tone  Coordination normal    Patient unable to ambulate secondary to dizziness  Patient appears unsteady when trying to walk  No nystagmus  Cranial nerves 2-12 intact bilaterally, negative Betzy-Hallpike  Negative point-to-point, negative pronator drift, muscular strength 5/5 in the upper lower extremities bilaterally  Skin: Skin is warm and dry  Capillary refill takes less than 2 seconds  No rash noted  She is not diaphoretic  No erythema  No pallor  Psychiatric: She has a normal mood and affect   Her behavior is normal  Judgment and thought content normal    Nursing note and vitals reviewed        ED Medications  Medications   sodium chloride (PF) 0 9 % injection 3 mL (has no administration in time range)   ondansetron (ZOFRAN) injection 4 mg (4 mg Intravenous Given 3/8/20 1641)   meclizine (ANTIVERT) tablet 25 mg (25 mg Oral Given 3/8/20 1641)   iohexol (OMNIPAQUE) 350 MG/ML injection (MULTI-DOSE) 85 mL (85 mL Intravenous Given 3/8/20 1735)       Diagnostic Studies  Results Reviewed     Procedure Component Value Units Date/Time    Troponin I [[id number]]  (Normal) Collected:  03/08/20 1638    Lab Status:  Final result Specimen:  Blood from Arm, Right Updated:  03/08/20 1705     Troponin I <0 02 ng/mL     Basic metabolic panel [[id number]] Collected:  03/08/20 1638    Lab Status:  Final result Specimen:  Blood from Arm, Right Updated:  03/08/20 1702     Sodium 137 mmol/L      Potassium 4 3 mmol/L      Chloride 105 mmol/L      CO2 27 mmol/L      ANION GAP 5 mmol/L      BUN 15 mg/dL      Creatinine 0 76 mg/dL      Glucose 132 mg/dL      Calcium 9 2 mg/dL      eGFR 82 ml/min/1 73sq m     Narrative:       [place] guidelines for Chronic Kidney Disease (CKD):     Stage 1 with normal or high GFR (GFR > 90 mL/min/1 73 square meters)    Stage 2 Mild CKD (GFR = 60-89 mL/min/1 73 square meters)    Stage 3A Moderate CKD (GFR = 45-59 mL/min/1 73 square meters)    Stage 3B Moderate CKD (GFR = 30-44 mL/min/1 73 square meters)    Stage 4 Severe CKD (GFR = 15-29 mL/min/1 73 square meters)    Stage 5 End Stage CKD (GFR <15 mL/min/1 73 square meters)  Note: GFR calculation is accurate only with a steady state creatinine    CBC and differential [[id number]]  (Abnormal) Collected:  03/08/20 1638    Lab Status:  Final result Specimen:  Blood from Arm, Right Updated:  03/08/20 1658     WBC 8 58 Thousand/uL      RBC 5 17 Million/uL      Hemoglobin 14 0 g/dL      Hematocrit 43 5 %      MCV 84 fL      MCH 27 1 pg      MCHC 32 2 g/dL RDW 14 7 %      MPV 13 2 fL      Platelets 336 Thousands/uL      nRBC 0 /100 WBCs      Neutrophils Relative 66 %      Immat GRANS % 0 %      Lymphocytes Relative 21 %      Monocytes Relative 8 %      Eosinophils Relative 4 %      Basophils Relative 1 %      Neutrophils Absolute 5 69 Thousands/µL      Immature Grans Absolute 0 03 Thousand/uL      Lymphocytes Absolute 1 80 Thousands/µL      Monocytes Absolute 0 69 Thousand/µL      Eosinophils Absolute 0 31 Thousand/µL      Basophils Absolute 0 06 Thousands/µL                  CTA head and neck with and without contrast   Final Result by Cindy Dey DO (03/08 1803)      Mild chronic microangiopathic changes  No acute intracranial abnormality  No significant carotid or vertebral artery stenosis  No focal intracranial stenosis or aneurysm  Incidental thyroid nodule(s) for which nonemergent thyroid ultrasound is recommended  The study was marked in EPIC for significant notification  Workstation performed: [id number]               Procedures  Procedures      ED Course  ED Course as of Mar 08 1911   Nehemiah Albarado Mar 08, 2020   1730 Patient has had resolution of her dizziness after meclizine  1823 Patient ambulated without any gross abnormalities  Patient given copy of CTA head and neck results  Patient informed of the thyroid nodule on the CT scan and informed to arrange outpatient follow-up/ultrasound  Please see attachments for signed receipt of this CTA scan  Identification of Seniors at Risk      Most Recent Value   (ISAR) Identification of Seniors at Risk   Before the illness or injury that brought you to the Emergency, did you need someone to help you on a regular basis? 0 Filed at: 03/08/2020 1532   In the last 24 hours, have you needed more help than usual?  0 Filed at: 03/08/2020 1532   Have you been hospitalized for one or more nights during the past 6 months?   0 Filed at: 03/08/2020 1532   In general, do you see well?  0 Filed at: 03/08/2020 1532   In general, do you have serious problems with your memory? 0 Filed at: 03/08/2020 1532   Do you take more than three different medications every day? 1 Filed at: 03/08/2020 1532   ISAR Score  1 Filed at: 03/08/2020 1532                          MDM  Number of Diagnoses or Management Options  Dizziness: new and requires workup  Thyroid nodule: new and requires workup  Diagnosis management comments: 16-year-old female presenting emergency department with dizziness and nausea  Patient has no neurological deficits on exam besides being unable to ambulate  Differential diagnosis:  Intracranial bleed/posterior bleed more likely than ischemic stroke as patient is anticoagulated on Eliquis, peripheral vertigo, central vertigo, anemia, electrolyte abnormality  Workup will include CTA neck and brain, CBC, BMP, troponin, ECG  Workup without acute findings  Patient was able to ambulate after 1 dosage meclizine  Patient feels safe to go home  Minimal suspicion for embolic stroke  Patient told to come back to the emergency department if she has new dizziness, neurological deficits such as change in vision, hearing, numbness, tingling, weakness, inability to walk  CT scan revealed a thyroid nodule  Patient told to follow up with primary care doctor regarding ultrasound of the nodule         Amount and/or Complexity of Data Reviewed  Clinical lab tests: ordered and reviewed  Tests in the radiology section of CPT®: ordered and reviewed  Decide to obtain previous medical records or to obtain history from someone other than the patient: yes  Review and summarize past medical records: yes  Independent visualization of images, tracings, or specimens: yes    Risk of Complications, Morbidity, and/or Mortality  Presenting problems: high  Diagnostic procedures: high  Management options: high    Patient Progress  Patient progress: stable        Disposition  Final diagnoses:   Dizziness Thyroid nodule     Time reflects when diagnosis was documented in both MDM as applicable and the Disposition within this note     Time User Action Codes Description Comment    3/8/2020  6:13 PM Delfino Rily Add [R42] Dizziness     3/8/2020  6:13 PM Delfino Rily Add [E04 1] Thyroid nodule       ED Disposition     ED Disposition Condition Date/Time Comment    Discharge Stable Sun Mar 8, 2020  6:12 PM [facility] discharge to home/self care  Follow-up Information     Follow up With Specialties Details Why Contact Info Additional [address] Emergency Department Emergency Medicine  If symptoms worsen [address]  [phone]  ED, [address], [address] [place] [address] 108    [address]  Call  For re-evaluation as soon as possible [phone]             Discharge Medication List as of 3/8/2020  6:17 PM      START taking these medications    Details   meclizine (ANTIVERT) 12 5 MG tablet Take 1 tablet (12 5 mg total) by mouth every 8 (eight) hours as needed for dizziness, Starting Sun 3/8/2020, Print           No discharge procedures on file  PDMP Review     None           ED Provider  Attending physically available and evaluated [facility]  I managed the patient along with the ED Attending      Electronically Signed by         Selvin Palmer DO  03/08/20 4521

## 2020-03-09 LAB
ATRIAL RATE: 63 BPM
P AXIS: 59 DEGREES
PR INTERVAL: 174 MS
QRS AXIS: -29 DEGREES
QRSD INTERVAL: 158 MS
QT INTERVAL: 478 MS
QTC INTERVAL: 489 MS
T WAVE AXIS: 96 DEGREES
VENTRICULAR RATE: 63 BPM

## 2020-03-09 PROCEDURE — 93010 ELECTROCARDIOGRAM REPORT: CPT | Performed by: INTERNAL MEDICINE

## 2020-03-12 ENCOUNTER — OFFICE VISIT (OUTPATIENT)
Dept: CARDIOLOGY CLINIC | Facility: CLINIC | Age: 65
End: 2020-03-12
Payer: COMMERCIAL

## 2020-03-12 VITALS
HEART RATE: 56 BPM | BODY MASS INDEX: 34.41 KG/M2 | SYSTOLIC BLOOD PRESSURE: 142 MMHG | HEIGHT: 62 IN | WEIGHT: 187 LBS | DIASTOLIC BLOOD PRESSURE: 70 MMHG

## 2020-03-12 DIAGNOSIS — I20.9 ANGINA, CLASS III (HCC): ICD-10-CM

## 2020-03-12 DIAGNOSIS — E78.5 DYSLIPIDEMIA: ICD-10-CM

## 2020-03-12 DIAGNOSIS — Z98.61 CAD S/P PERCUTANEOUS CORONARY ANGIOPLASTY: Primary | ICD-10-CM

## 2020-03-12 DIAGNOSIS — I10 HYPERTENSION, ESSENTIAL, BENIGN: ICD-10-CM

## 2020-03-12 DIAGNOSIS — I25.708 CORONARY ARTERY DISEASE OF BYPASS GRAFT OF NATIVE HEART WITH STABLE ANGINA PECTORIS (HCC): ICD-10-CM

## 2020-03-12 DIAGNOSIS — I25.10 CAD S/P PERCUTANEOUS CORONARY ANGIOPLASTY: Primary | ICD-10-CM

## 2020-03-12 PROCEDURE — 1160F RVW MEDS BY RX/DR IN RCRD: CPT | Performed by: INTERNAL MEDICINE

## 2020-03-12 PROCEDURE — 4040F PNEUMOC VAC/ADMIN/RCVD: CPT | Performed by: INTERNAL MEDICINE

## 2020-03-12 PROCEDURE — 1036F TOBACCO NON-USER: CPT | Performed by: INTERNAL MEDICINE

## 2020-03-12 PROCEDURE — 99214 OFFICE O/P EST MOD 30 MIN: CPT | Performed by: INTERNAL MEDICINE

## 2020-03-12 PROCEDURE — 3078F DIAST BP <80 MM HG: CPT | Performed by: INTERNAL MEDICINE

## 2020-03-12 PROCEDURE — 3077F SYST BP >= 140 MM HG: CPT | Performed by: INTERNAL MEDICINE

## 2020-03-12 PROCEDURE — 3008F BODY MASS INDEX DOCD: CPT | Performed by: INTERNAL MEDICINE

## 2020-03-12 RX ORDER — NITROGLYCERIN 0.4 MG/1
0.4 TABLET SUBLINGUAL
Qty: 90 TABLET | Refills: 0 | Status: SHIPPED | OUTPATIENT
Start: 2020-03-12

## 2020-03-12 NOTE — PROGRESS NOTES
Cardiology Follow Up    Karl Edwards  1955  [id number]  [address] [facility] [place]  [facility] [address]  [address]  [phone]    1  CAD S/P percutaneous coronary angioplasty     2  Coronary artery disease of bypass graft of native heart with stable angina pectoris ([facility] [address] )     3  Hypertension, essential, benign     4  Dyslipidemia         Interval History:   Cardiology follow-up  Patient was last seen in 12/17  Patient is clinically stable, angina pattern appears to be stable  Minimal nitroglycerin use, she walks regularly 1 hour at brisk walk without any exertional symptoms      Recent emergency room evaluation for dizziness, no cardiac component  EKG was unchanged with left bundle-branch block  Appears to be vertigo in nature  She did undergo a CTA that revealed no intracranial or extracranial obstructive arterial disease  There was mild microangiopathy noted in the brain  patient states compliant with low-cholesterol diet  Most recent lipid profile last year total cholesterol of 169, HDL 51 and LDL of 91  On high-intensity statin therapy  Compliant low-sodium diet, blood pressures been well control      Patient Active Problem List   Diagnosis    Coronary artery disease    CAD S/P percutaneous coronary angioplasty    Hypertension, essential, benign    Atrophic vaginitis    Angina, class III (HCC)    Allergic rhinitis    Acid reflux disease    Dyslipidemia    Edema, lower extremity    Fatigue    Glaucoma, open angle    Hearing difficulty    Osteoarthritis of knee    Scoliosis    Uterovaginal prolapse, incomplete    Microscopic hematuria    Restless leg syndrome    Recurrent deep venous thrombosis (HCC)    Pain of both hip joints    Acute pain of left knee    Hypercholesterolemia    Nonintractable episodic headache    Need for vaccination    Pre-op examination    Dental caries Past Medical History:   Diagnosis Date    Angina at rest [facility])     Coronary artery disease     stents placed    Depression     resolved 6/29/16    Heart attack ([address] )     History of angioplasty     History of cardiac cath 2003    HLD (hyperlipidemia)     Hyperlipidemia     Hypertension      Social History     Socioeconomic History    Marital status: /Civil Union     Spouse name: Not on file    Number of children: Not on file    Years of education: Not on file    Highest education level: Not on file   Occupational History    Not on file   Social Needs    Financial resource strain: Not on file    Food insecurity:     Worry: Not on file     Inability: Not on file    Transportation needs:     Medical: Not on file     Non-medical: Not on file   Tobacco Use    Smoking status: Never Smoker    Smokeless tobacco: Never Used   Substance and Sexual Activity    Alcohol use: Never     Frequency: Never    Drug use: Never    Sexual activity: Not on file   Lifestyle    Physical activity:     Days per week: Not on file     Minutes per session: Not on file    Stress: Not on file   Relationships    Social connections:     Talks on phone: Not on file     Gets together: Not on file     Attends Religion service: Not on file     Active member of club or organization: Not on file     Attends meetings of clubs or organizations: Not on file     Relationship status: Not on file    Intimate partner violence:     Fear of current or ex partner: Not on file     Emotionally abused: Not on file     Physically abused: Not on file     Forced sexual activity: Not on file   Other Topics Concern    Not on file   Social History Narrative    ** Merged History Encounter **         Lives with adult children  Alevism      Family History   Problem Relation Age of Onset    Diabetes Mother     Heart attack Mother         MI    Stroke Father         CVA    Hypothyroidism Sister     Heart disease Family cardiovascular disease    Hypertension Family      Past Surgical History:   Procedure Laterality Date    CARDIAC CATHETERIZATION      CARDIAC SURGERY  2000    open heart     CHOLECYSTECTOMY      CORONARY ANGIOPLASTY WITH STENT PLACEMENT      last assessed 6/29/16    CORONARY ANGIOPLASTY WITH STENT PLACEMENT      EAR SURGERY  1984    MN COLONOSCOPY FLX DX W/COLLJ SPEC WHEN PFRMD N/A 6/22/2017    Procedure: EGD AND COLONOSCOPY;  Surgeon: Samir Ruelas MD;  Location: [facility];   Service: Gastroenterology       Current Outpatient Medications:     acetaminophen (TYLENOL) 500 mg tablet, Take 500 mg by mouth every 6 (six) hours as needed for mild pain, Disp: , Rfl:     aluminum-magnesium hydroxide 200-200 MG/5ML suspension, Take 5 mL by mouth every 6 (six) hours as needed for heartburn, Disp: , Rfl:     aspirin 81 MG tablet, Take 1 tablet by mouth daily, Disp: , Rfl:     atorvastatin (LIPITOR) 80 mg tablet, take 1 tablet by mouth once daily, Disp: 90 tablet, Rfl: 0    conjugated estrogens (PREMARIN) vaginal cream, Apply a pea size amount of the estrogen cream to the opening of the vagina three nights per week , Disp: , Rfl:     ELIQUIS 5 MG, TAKE 1 TABLET EVERY 12 HOURS, Disp: 180 tablet, Rfl: 0    fluticasone (FLONASE) 50 mcg/act nasal spray, 1 spray into each nostril daily, Disp: 1 Bottle, Rfl: 1    latanoprost (XALATAN) 0 005 % ophthalmic solution, 1 drop daily at bedtime, Disp: , Rfl:     losartan (COZAAR) 100 MG tablet, take 1 tablet by mouth once daily, Disp: 90 tablet, Rfl: 0    meclizine (ANTIVERT) 12 5 MG tablet, Take 1 tablet (12 5 mg total) by mouth daily as needed for dizziness, Disp: 10 tablet, Rfl: 0    meclizine (ANTIVERT) 12 5 MG tablet, Take 1 tablet (12 5 mg total) by mouth every 8 (eight) hours as needed for dizziness, Disp: 20 tablet, Rfl: 0    metoprolol tartrate (LOPRESSOR) 50 mg tablet, TAKE 1 TABLET BY MOUTH EVERY 12 HOURS, Disp: 180 tablet, Rfl: 1    nitroglycerin (NITROSTAT) 0 4 mg SL tablet, Place 1 tablet (0 4 mg total) under the tongue every 5 (five) minutes as needed for chest pain, Disp: 90 tablet, Rfl: 0    omeprazole (PriLOSEC) 40 MG capsule, take 1 capsule by mouth once daily, Disp: 30 capsule, Rfl: 1    RA LORATADINE 10 MG tablet, take 1 tablet by mouth once daily, Disp: 90 tablet, Rfl: 1    ranolazine (Ranexa) 500 mg 12 hr tablet, Take 1 tablet (500 mg total) by mouth 2 (two) times a day, Disp: 60 tablet, Rfl: 0  Allergies   Allergen Reactions    Ibuprofen        Labs:  Admission on 03/08/2020, Discharged on 03/08/2020   Component Date Value    Ventricular Rate 03/08/2020 63     Atrial Rate 03/08/2020 63     SC Interval 03/08/2020 174     QRSD Interval 03/08/2020 158     QT Interval 03/08/2020 478     QTC Interval 03/08/2020 489     P Axis 03/08/2020 59     QRS Axis 03/08/2020 -29     T Wave Axis 03/08/2020 96     WBC 03/08/2020 8 58     RBC 03/08/2020 5 17*    Hemoglobin 03/08/2020 14 0     Hematocrit 03/08/2020 43 5     MCV 03/08/2020 84     MCH 03/08/2020 27 1     MCHC 03/08/2020 32 2     RDW 03/08/2020 14 7     MPV 03/08/2020 13 2*    Platelets 09/17/3310 146*    nRBC 03/08/2020 0     Neutrophils Relative 03/08/2020 66     Immat GRANS % 03/08/2020 0     Lymphocytes Relative 03/08/2020 21     Monocytes Relative 03/08/2020 8     Eosinophils Relative 03/08/2020 4     Basophils Relative 03/08/2020 1     Neutrophils Absolute 03/08/2020 5 69     Immature Grans Absolute 03/08/2020 0 03     Lymphocytes Absolute 03/08/2020 1 80     Monocytes Absolute 03/08/2020 0 69     Eosinophils Absolute 03/08/2020 0 31     Basophils Absolute 03/08/2020 0 06     Sodium 03/08/2020 137     Potassium 03/08/2020 4 3     Chloride 03/08/2020 105     CO2 03/08/2020 27     ANION GAP 03/08/2020 5     BUN 03/08/2020 15     Creatinine 03/08/2020 0 76     Glucose 03/08/2020 132     Calcium 03/08/2020 9 2     eGFR 03/08/2020 82     Troponin I 03/08/2020 <0 02 Transcribe Orders on 02/14/2020   Component Date Value    Clarity, UA 02/14/2020 Cloudy     Color, UA 02/14/2020 Yellow     Specific Gravity, UA 02/14/2020 1 018     pH, UA 02/14/2020 6 0     Glucose, UA 02/14/2020 Negative     Ketones, UA 02/14/2020 Negative     Blood, UA 02/14/2020 Negative     Protein, UA 02/14/2020 Negative     Nitrite, UA 02/14/2020 Negative     Bilirubin, UA 02/14/2020 Negative     Urobilinogen, UA 02/14/2020 0 2     Leukocytes, UA 02/14/2020 Small*    WBC, UA 02/14/2020 2-4*    RBC, UA 02/14/2020 None Seen     Hyaline Casts, UA 02/14/2020 5-10*    Bacteria, UA 02/14/2020 Occasional     Epithelial Cells 02/14/2020 Moderate*     Imaging: Cta Head And Neck With And Without Contrast    Result Date: 3/8/2020  Narrative: CTA NECK AND BRAIN WITH AND WITHOUT CONTRAST INDICATION: Ataxia, stroke suspected COMPARISON:   None  TECHNIQUE:  Routine CT imaging of the Brain without contrast   Post contrast imaging was performed after administration of iodinated contrast through the neck and brain  Post contrast axial 0 625 mm images timed to opacify the arterial system  3D rendering was performed on an independent workstation  MIP reconstructions performed  Coronal reconstructions were performed of the noncontrast portion of the brain  Radiation dose length product (DLP) for this visit:  1351 26 mGy-cm   This examination, like all CT scans performed in the [facility], was performed utilizing techniques to minimize radiation dose exposure, including the use of iterative reconstruction and automated exposure control  IV Contrast:  85 mL of iohexol (OMNIPAQUE)  IMAGE QUALITY:   Diagnostic FINDINGS: NONCONTRAST BRAIN PARENCHYMA: Decreased attenuation is noted in periventricular and subcortical white matter demonstrating an appearance that is statistically most likely to represent mild microangiopathic change    Tiny 2 mm calcification identified right caudate head//subependymal region of uncertain etiology  No surrounding edema  No CT signs of acute infarction  No intracranial mass, mass effect or midline shift  No acute parenchymal hemorrhage  VENTRICLES AND EXTRA-AXIAL SPACES:  Normal for the patient's age  VISUALIZED ORBITS AND PARANASAL SINUSES:  Unremarkable  CERVICAL VASCULATURE AORTIC ARCH AND GREAT VESSELS:  Normal aortic arch and great vessel origins  Normal visualized subclavian vessels  RIGHT VERTEBRAL ARTERY CERVICAL SEGMENT:  Normal origin  The vessel is normal in caliber throughout the neck  LEFT VERTEBRAL ARTERY CERVICAL SEGMENT:  Normal origin  The vessel is normal in caliber throughout the neck  RIGHT EXTRACRANIAL CAROTID SEGMENT:  Mild atherosclerotic disease of the distal common carotid artery and proximal cervical internal carotid artery without significant stenosis compared to the more distal ICA  LEFT EXTRACRANIAL CAROTID SEGMENT:  Mild atherosclerotic disease of the distal common carotid artery and proximal cervical internal carotid artery without significant stenosis compared to the more distal ICA  NASCET criteria was used to determine the degree of internal carotid artery diameter stenosis  INTRACRANIAL VASCULATURE INTERNAL CAROTID ARTERIES:  Normal enhancement of the intracranial portions of the internal carotid arteries  Normal ophthalmic artery origins  Normal ICA terminus  ANTERIOR CIRCULATION:  Symmetric A1 segments and anterior cerebral arteries with normal enhancement  Normal anterior communicating artery  MIDDLE CEREBRAL ARTERY CIRCULATION:  M1 segment and middle cerebral artery branches demonstrate normal enhancement bilaterally  DISTAL VERTEBRAL ARTERIES:  Normal distal vertebral arteries  Posterior inferior cerebellar artery origins are normal  Normal vertebral basilar junction  BASILAR ARTERY:  Basilar artery is normal in caliber  Normal superior cerebellar arteries   POSTERIOR CEREBRAL ARTERIES: Both posterior cerebral arteries arises from the basilar tip  Both arteries demonstrate normal enhancement  Normal posterior communicating arteries  DURAL VENOUS SINUSES:  Normal  NON VASCULAR ANATOMY BONY STRUCTURES:  No acute osseous abnormality  SOFT TISSUES OF THE NECK:  Left thyroid nodule measures 2 4 x 2 0 x 3 2 cm  THORACIC INLET:  Unremarkable  Impression: Mild chronic microangiopathic changes  No acute intracranial abnormality  No significant carotid or vertebral artery stenosis  No focal intracranial stenosis or aneurysm  Incidental thyroid nodule(s) for which nonemergent thyroid ultrasound is recommended  The study was marked in EPIC for significant notification  Workstation performed: [id number]       Review of Systems:  Review of Systems   Constitutional: Negative for activity change and fatigue  HENT: Negative for nosebleeds  Eyes: Negative for visual disturbance  Respiratory: Negative for apnea, shortness of breath, wheezing and stridor  Cardiovascular: Positive for chest pain  Negative for palpitations and leg swelling  Gastrointestinal: Negative for abdominal pain, anal bleeding and blood in stool  Endocrine: Negative for cold intolerance  Genitourinary: Negative for hematuria  Musculoskeletal: Negative for arthralgias, gait problem and myalgias  Skin: Negative for pallor and rash  Allergic/Immunologic: Negative for immunocompromised state  Neurological: Negative for dizziness and syncope  Hematological: Bruises/bleeds easily  Psychiatric/Behavioral: Negative for sleep disturbance  The patient is not nervous/anxious  Physical Exam:  Physical Exam   Constitutional: She appears well-developed  No distress  Neck: No JVD present  Cardiovascular: Normal rate, regular rhythm, normal heart sounds and intact distal pulses  Exam reveals no gallop and no friction rub  No murmur heard  Pulmonary/Chest: Effort normal and breath sounds normal  No stridor  No respiratory distress   She has no wheezes  She has no rales  Musculoskeletal: She exhibits no edema  Neurological: She is alert  Skin: Skin is warm  Capillary refill takes less than 2 seconds  She is not diaphoretic  Psychiatric: She has a normal mood and affect  Vitals reviewed  Discussion/Summary:  Coronary artery disease, complex history bypass surgery times 1 with LIMA to the LAD  Nayelindo angina class 03/2015 stress test abnormal with anteroapical ischemia, catheterization revealed occluded LIMA to the LAD, underwent PTCA stent of the LAD into the diagonal the LAD was occluded after that with improvement of the collateral flow from the diagonal to the distal LAD  Mild-to-moderate circumflex disease and RCA disease in setting of small coronary arteries  Stable angina class 1 at present time, echocardiogram at that time revealed normal left systolic function with stage I diastolic dysfunction  Echo last year revealed low normal left systolic function ejection fraction 50%, stage II diastolic dysfunction mild mitral and tricuspid insufficiency with estimated normal pulmonary pressures suggested by Doppler criteria  Will repeat a lipid profile  Continue current medications  Will check lipid profile  Clearance for dental work as well as possible cataract surgery was given to the patient  This note was completed in part utilizing [employer] direct voice recognition software  Grammatical errors, random word insertion, spelling mistakes, and incomplete sentences may be an occasional consequence of the system secondary to software limitations, ambient noise and hardware issues  At the time of dictation, efforts were made to edit, clarify and /or correct errors  Please read the chart carefully and recognize, using context, where substitutions have occurred    If you have any questions or concerns about the context, text or information contained within the body of this dictation, please contact myself, the provider, for further clarification

## 2020-03-26 DIAGNOSIS — I10 BENIGN ESSENTIAL HYPERTENSION: ICD-10-CM

## 2020-03-26 DIAGNOSIS — K21.9 GASTROESOPHAGEAL REFLUX DISEASE, ESOPHAGITIS PRESENCE NOT SPECIFIED: ICD-10-CM

## 2020-03-26 RX ORDER — LOSARTAN POTASSIUM 50 MG/1
TABLET ORAL
Qty: 180 TABLET | OUTPATIENT
Start: 2020-03-26

## 2020-03-26 RX ORDER — LOSARTAN POTASSIUM 100 MG/1
100 TABLET ORAL DAILY
Qty: 90 TABLET | Refills: 0 | Status: SHIPPED | OUTPATIENT
Start: 2020-03-26 | End: 2020-10-17

## 2020-03-26 RX ORDER — OMEPRAZOLE 40 MG/1
CAPSULE, DELAYED RELEASE ORAL
Qty: 30 CAPSULE | Refills: 1 | Status: SHIPPED | OUTPATIENT
Start: 2020-03-26 | End: 2020-06-07 | Stop reason: SDUPTHER

## 2020-03-27 DIAGNOSIS — I25.118 CORONARY ARTERY DISEASE INVOLVING NATIVE HEART WITH OTHER FORM OF ANGINA PECTORIS, UNSPECIFIED VESSEL OR LESION TYPE (HCC): ICD-10-CM

## 2020-03-27 RX ORDER — RANOLAZINE 500 MG/1
500 TABLET, EXTENDED RELEASE ORAL 2 TIMES DAILY
Qty: 60 TABLET | Refills: 8 | Status: SHIPPED | OUTPATIENT
Start: 2020-03-27 | End: 2021-02-15

## 2020-03-31 DIAGNOSIS — I20.9 ANGINA, CLASS III (HCC): ICD-10-CM

## 2020-03-31 DIAGNOSIS — I10 BENIGN ESSENTIAL HYPERTENSION: ICD-10-CM

## 2020-03-31 RX ORDER — METOPROLOL TARTRATE 50 MG/1
50 TABLET, FILM COATED ORAL EVERY 12 HOURS
Qty: 180 TABLET | Refills: 1 | Status: SHIPPED | OUTPATIENT
Start: 2020-03-31 | End: 2020-04-13 | Stop reason: SDUPTHER

## 2020-04-13 ENCOUNTER — TELEMEDICINE (OUTPATIENT)
Dept: FAMILY MEDICINE CLINIC | Facility: CLINIC | Age: 65
End: 2020-04-13

## 2020-04-13 DIAGNOSIS — E04.1 LEFT THYROID NODULE: Primary | ICD-10-CM

## 2020-04-13 DIAGNOSIS — I20.9 ANGINA, CLASS III (HCC): ICD-10-CM

## 2020-04-13 DIAGNOSIS — I10 BENIGN ESSENTIAL HYPERTENSION: ICD-10-CM

## 2020-04-13 PROBLEM — Z23 NEED FOR VACCINATION: Status: RESOLVED | Noted: 2019-07-11 | Resolved: 2020-04-13

## 2020-04-13 PROBLEM — Z01.818 PRE-OP EXAMINATION: Status: RESOLVED | Noted: 2019-07-12 | Resolved: 2020-04-13

## 2020-04-13 PROCEDURE — 99213 OFFICE O/P EST LOW 20 MIN: CPT | Performed by: FAMILY MEDICINE

## 2020-04-13 RX ORDER — METOPROLOL TARTRATE 50 MG/1
50 TABLET, FILM COATED ORAL EVERY 12 HOURS
Qty: 60 TABLET | Refills: 2 | Status: SHIPPED | OUTPATIENT
Start: 2020-04-13 | End: 2021-01-14

## 2020-04-26 ENCOUNTER — APPOINTMENT (OUTPATIENT)
Dept: LAB | Facility: HOSPITAL | Age: 65
End: 2020-04-26
Attending: INTERNAL MEDICINE
Payer: COMMERCIAL

## 2020-04-26 DIAGNOSIS — Z98.61 CAD S/P PERCUTANEOUS CORONARY ANGIOPLASTY: ICD-10-CM

## 2020-04-26 DIAGNOSIS — I25.10 CAD S/P PERCUTANEOUS CORONARY ANGIOPLASTY: ICD-10-CM

## 2020-04-26 DIAGNOSIS — E04.1 LEFT THYROID NODULE: ICD-10-CM

## 2020-04-26 DIAGNOSIS — I25.708 CORONARY ARTERY DISEASE OF BYPASS GRAFT OF NATIVE HEART WITH STABLE ANGINA PECTORIS (HCC): ICD-10-CM

## 2020-04-26 DIAGNOSIS — E78.5 DYSLIPIDEMIA: ICD-10-CM

## 2020-04-26 LAB
CHOLEST SERPL-MCNC: 173 MG/DL (ref 50–200)
HDLC SERPL-MCNC: 56 MG/DL
LDLC SERPL CALC-MCNC: 91 MG/DL (ref 0–100)
TRIGL SERPL-MCNC: 131 MG/DL
TSH SERPL DL<=0.05 MIU/L-ACNC: 3.49 UIU/ML (ref 0.36–3.74)

## 2020-04-26 PROCEDURE — 80061 LIPID PANEL: CPT

## 2020-04-26 PROCEDURE — 84443 ASSAY THYROID STIM HORMONE: CPT

## 2020-04-26 PROCEDURE — 36415 COLL VENOUS BLD VENIPUNCTURE: CPT

## 2020-04-28 ENCOUNTER — TELEMEDICINE (OUTPATIENT)
Dept: FAMILY MEDICINE CLINIC | Facility: CLINIC | Age: 65
End: 2020-04-28

## 2020-04-28 DIAGNOSIS — E04.1 LEFT THYROID NODULE: Primary | ICD-10-CM

## 2020-04-28 PROCEDURE — 99213 OFFICE O/P EST LOW 20 MIN: CPT | Performed by: FAMILY MEDICINE

## 2020-05-11 DIAGNOSIS — I82.409 RECURRENT DEEP VENOUS THROMBOSIS (HCC): ICD-10-CM

## 2020-05-13 ENCOUNTER — TELEPHONE (OUTPATIENT)
Dept: FAMILY MEDICINE CLINIC | Facility: CLINIC | Age: 65
End: 2020-05-13

## 2020-05-13 ENCOUNTER — HOSPITAL ENCOUNTER (OUTPATIENT)
Dept: ULTRASOUND IMAGING | Facility: HOSPITAL | Age: 65
Discharge: HOME | End: 2020-05-13
Payer: COMMERCIAL

## 2020-05-13 DIAGNOSIS — E04.1 LEFT THYROID NODULE: ICD-10-CM

## 2020-05-13 PROCEDURE — 76536 US EXAM OF HEAD AND NECK: CPT

## 2020-05-14 ENCOUNTER — TELEPHONE (OUTPATIENT)
Dept: FAMILY MEDICINE CLINIC | Facility: CLINIC | Age: 65
End: 2020-05-14

## 2020-05-14 DIAGNOSIS — E04.1 LEFT THYROID NODULE: Primary | ICD-10-CM

## 2020-05-15 ENCOUNTER — TELEPHONE (OUTPATIENT)
Dept: FAMILY MEDICINE CLINIC | Facility: CLINIC | Age: 65
End: 2020-05-15

## 2020-05-18 ENCOUNTER — TELEPHONE (OUTPATIENT)
Dept: FAMILY MEDICINE CLINIC | Facility: CLINIC | Age: 65
End: 2020-05-18

## 2020-05-18 ENCOUNTER — TELEMEDICINE (OUTPATIENT)
Dept: FAMILY MEDICINE CLINIC | Facility: CLINIC | Age: 65
End: 2020-05-18

## 2020-05-18 DIAGNOSIS — E04.1 LEFT THYROID NODULE: Primary | ICD-10-CM

## 2020-05-18 PROBLEM — Z86.718 HISTORY OF DVT (DEEP VEIN THROMBOSIS): Status: ACTIVE | Noted: 2020-05-18

## 2020-05-18 PROCEDURE — 99213 OFFICE O/P EST LOW 20 MIN: CPT | Performed by: FAMILY MEDICINE

## 2020-05-18 PROCEDURE — 1160F RVW MEDS BY RX/DR IN RCRD: CPT | Performed by: FAMILY MEDICINE

## 2020-05-19 ENCOUNTER — TELEPHONE (OUTPATIENT)
Dept: FAMILY MEDICINE CLINIC | Facility: CLINIC | Age: 65
End: 2020-05-19

## 2020-05-29 ENCOUNTER — HOSPITAL ENCOUNTER (OUTPATIENT)
Dept: ULTRASOUND IMAGING | Facility: HOSPITAL | Age: 65
Discharge: HOME | End: 2020-05-29
Admitting: RADIOLOGY
Payer: COMMERCIAL

## 2020-05-29 DIAGNOSIS — E04.1 LEFT THYROID NODULE: ICD-10-CM

## 2020-05-29 PROCEDURE — 88173 CYTOPATH EVAL FNA REPORT: CPT | Performed by: PATHOLOGY

## 2020-05-29 PROCEDURE — 10005 FNA BX W/US GDN 1ST LES: CPT

## 2020-05-29 PROCEDURE — 88172 CYTP DX EVAL FNA 1ST EA SITE: CPT | Performed by: PATHOLOGY

## 2020-05-29 RX ORDER — LIDOCAINE HYDROCHLORIDE 10 MG/ML
5 INJECTION, SOLUTION EPIDURAL; INFILTRATION; INTRACAUDAL; PERINEURAL ONCE
Status: COMPLETED | OUTPATIENT
Start: 2020-05-29 | End: 2020-05-29

## 2020-05-29 RX ADMIN — LIDOCAINE HYDROCHLORIDE 5 ML: 10 INJECTION, SOLUTION EPIDURAL; INFILTRATION; INTRACAUDAL; PERINEURAL at 08:45

## 2020-06-07 DIAGNOSIS — E78.5 HYPERLIPIDEMIA, UNSPECIFIED HYPERLIPIDEMIA TYPE: ICD-10-CM

## 2020-06-07 DIAGNOSIS — K21.9 GASTROESOPHAGEAL REFLUX DISEASE, ESOPHAGITIS PRESENCE NOT SPECIFIED: ICD-10-CM

## 2020-06-07 RX ORDER — ATORVASTATIN CALCIUM 80 MG/1
TABLET, FILM COATED ORAL
Qty: 90 TABLET | Refills: 0 | Status: SHIPPED | OUTPATIENT
Start: 2020-06-07 | End: 2020-09-14

## 2020-06-07 RX ORDER — OMEPRAZOLE 40 MG/1
CAPSULE, DELAYED RELEASE ORAL
Qty: 30 CAPSULE | Refills: 1 | Status: SHIPPED | OUTPATIENT
Start: 2020-06-07 | End: 2020-08-03

## 2020-06-15 ENCOUNTER — OFFICE VISIT (OUTPATIENT)
Dept: FAMILY MEDICINE CLINIC | Facility: CLINIC | Age: 65
End: 2020-06-15

## 2020-06-15 VITALS
WEIGHT: 186 LBS | RESPIRATION RATE: 16 BRPM | OXYGEN SATURATION: 99 % | HEIGHT: 62 IN | SYSTOLIC BLOOD PRESSURE: 120 MMHG | DIASTOLIC BLOOD PRESSURE: 60 MMHG | HEART RATE: 56 BPM | TEMPERATURE: 97 F | BODY MASS INDEX: 34.23 KG/M2

## 2020-06-15 DIAGNOSIS — L53.9 ERYTHEMA OF FACE: Primary | ICD-10-CM

## 2020-06-15 DIAGNOSIS — E04.1 LEFT THYROID NODULE: ICD-10-CM

## 2020-06-15 DIAGNOSIS — I10 HYPERTENSION, ESSENTIAL, BENIGN: ICD-10-CM

## 2020-06-15 PROCEDURE — 1036F TOBACCO NON-USER: CPT | Performed by: FAMILY MEDICINE

## 2020-06-15 PROCEDURE — 3008F BODY MASS INDEX DOCD: CPT | Performed by: FAMILY MEDICINE

## 2020-06-15 PROCEDURE — 3074F SYST BP LT 130 MM HG: CPT | Performed by: FAMILY MEDICINE

## 2020-06-15 PROCEDURE — 99213 OFFICE O/P EST LOW 20 MIN: CPT | Performed by: FAMILY MEDICINE

## 2020-06-15 PROCEDURE — 3008F BODY MASS INDEX DOCD: CPT | Performed by: INTERNAL MEDICINE

## 2020-06-15 PROCEDURE — 3288F FALL RISK ASSESSMENT DOCD: CPT | Performed by: FAMILY MEDICINE

## 2020-06-15 PROCEDURE — 4040F PNEUMOC VAC/ADMIN/RCVD: CPT | Performed by: FAMILY MEDICINE

## 2020-06-15 PROCEDURE — 1101F PT FALLS ASSESS-DOCD LE1/YR: CPT | Performed by: FAMILY MEDICINE

## 2020-06-15 PROCEDURE — 3078F DIAST BP <80 MM HG: CPT | Performed by: FAMILY MEDICINE

## 2020-06-15 PROCEDURE — 1160F RVW MEDS BY RX/DR IN RCRD: CPT | Performed by: FAMILY MEDICINE

## 2020-06-16 ENCOUNTER — TELEPHONE (OUTPATIENT)
Dept: CARDIOLOGY CLINIC | Facility: CLINIC | Age: 65
End: 2020-06-16

## 2020-06-22 ENCOUNTER — APPOINTMENT (OUTPATIENT)
Dept: LAB | Facility: HOSPITAL | Age: 65
End: 2020-06-22
Payer: COMMERCIAL

## 2020-06-22 DIAGNOSIS — L53.9 ERYTHEMA OF FACE: ICD-10-CM

## 2020-06-22 LAB
CRP SERPL QL: <3 MG/L
ERYTHROCYTE [SEDIMENTATION RATE] IN BLOOD: 6 MM/HOUR (ref 0–20)
RHEUMATOID FACT SER QL LA: NEGATIVE

## 2020-06-22 PROCEDURE — 36415 COLL VENOUS BLD VENIPUNCTURE: CPT

## 2020-06-22 PROCEDURE — 86140 C-REACTIVE PROTEIN: CPT

## 2020-06-22 PROCEDURE — 86430 RHEUMATOID FACTOR TEST QUAL: CPT

## 2020-06-22 PROCEDURE — 86200 CCP ANTIBODY: CPT

## 2020-06-22 PROCEDURE — 85652 RBC SED RATE AUTOMATED: CPT

## 2020-06-22 PROCEDURE — 86038 ANTINUCLEAR ANTIBODIES: CPT

## 2020-06-23 LAB — CCP IGA+IGG SERPL IA-ACNC: 5 UNITS (ref 0–19)

## 2020-06-24 LAB — RYE IGE QN: NEGATIVE

## 2020-06-29 ENCOUNTER — TELEPHONE (OUTPATIENT)
Dept: FAMILY MEDICINE CLINIC | Facility: CLINIC | Age: 65
End: 2020-06-29

## 2020-06-29 DIAGNOSIS — L53.9 ERYTHEMA OF FACE: Primary | ICD-10-CM

## 2020-06-29 NOTE — TELEPHONE ENCOUNTER
----- Message from Re Vasquez MD sent at 6/29/2020  7:55 AM EDT -----  Please assist this patient with dermatology referral  Thank you!

## 2020-06-30 NOTE — TELEPHONE ENCOUNTER
1st attempt   [id number] Unable to [facility] phone rings and now answer      [facility]  [phone]

## 2020-07-01 NOTE — TELEPHONE ENCOUNTER
2nd attempt   [phone] Left message to see if patient needs help scheduling appointment   If patient calls back please give them the number to call to schedule      If patient needs assistance scheduling ask which day of the week the patient prefers and if patient prefers morning or afternoon and I will schedule and call them back      Specialty Phone:[phone]  [facility]

## 2020-07-02 NOTE — TELEPHONE ENCOUNTER
[id number] spoke w/ patient daughter who stated she had just called dermatology office and left a message and was awaiting a call back  Will continue to follow

## 2020-07-20 ENCOUNTER — OFFICE VISIT (OUTPATIENT)
Dept: FAMILY MEDICINE CLINIC | Facility: CLINIC | Age: 65
End: 2020-07-20

## 2020-07-20 VITALS
BODY MASS INDEX: 34.2 KG/M2 | RESPIRATION RATE: 16 BRPM | SYSTOLIC BLOOD PRESSURE: 126 MMHG | WEIGHT: 187 LBS | OXYGEN SATURATION: 98 % | DIASTOLIC BLOOD PRESSURE: 74 MMHG | HEART RATE: 55 BPM | TEMPERATURE: 97.6 F

## 2020-07-20 DIAGNOSIS — R20.8 BURNING SENSATION OF FEET: ICD-10-CM

## 2020-07-20 DIAGNOSIS — I10 HYPERTENSION, ESSENTIAL, BENIGN: ICD-10-CM

## 2020-07-20 DIAGNOSIS — Z12.31 ENCOUNTER FOR SCREENING MAMMOGRAM FOR MALIGNANT NEOPLASM OF BREAST: Primary | ICD-10-CM

## 2020-07-20 LAB
CREAT UR-MCNC: 37.8 MG/DL
MICROALBUMIN UR-MCNC: <5 MG/L (ref 0–20)
MICROALBUMIN/CREAT 24H UR: <13 MG/G CREATININE (ref 0–30)

## 2020-07-20 PROCEDURE — 3074F SYST BP LT 130 MM HG: CPT | Performed by: FAMILY MEDICINE

## 2020-07-20 PROCEDURE — 99213 OFFICE O/P EST LOW 20 MIN: CPT | Performed by: FAMILY MEDICINE

## 2020-07-20 PROCEDURE — 82043 UR ALBUMIN QUANTITATIVE: CPT | Performed by: FAMILY MEDICINE

## 2020-07-20 PROCEDURE — 4040F PNEUMOC VAC/ADMIN/RCVD: CPT | Performed by: FAMILY MEDICINE

## 2020-07-20 PROCEDURE — 1036F TOBACCO NON-USER: CPT | Performed by: FAMILY MEDICINE

## 2020-07-20 PROCEDURE — 3078F DIAST BP <80 MM HG: CPT | Performed by: FAMILY MEDICINE

## 2020-07-20 PROCEDURE — 82570 ASSAY OF URINE CREATININE: CPT | Performed by: FAMILY MEDICINE

## 2020-07-20 PROCEDURE — 1160F RVW MEDS BY RX/DR IN RCRD: CPT | Performed by: FAMILY MEDICINE

## 2020-07-20 NOTE — ASSESSMENT & PLAN NOTE
Advised using Tylenol winsome Q6-8 for next 3-4 days  Freeze plastic bottles and massage soles of feet BID  Provided exercises in AVS for pt to complete    If no improvements may consider referral to Podiatry

## 2020-07-20 NOTE — PATIENT INSTRUCTIONS
Ejercicios para la fascitis plantar   LO QUE NECESITA SABER:   Los ejercicios para la fascitis plantar ayudan a estirar la fascia plantar, los músculos de la pantorrilla y el tendón de Jairon  También ayudan a fortalecer los músculos que dan soporte a los talones y los pies  Julian Pickerel y el estiramiento pueden ayudar a evitar que la fascitis plantar empeore o regrese  INSTRUCCIONES SOBRE EL EL HOSPITALARIA:   Pregúntele a valenzuela [place] [employer] vitaminas y minerales son adecuados para usted  · Valenzuela dolor e inflamación aumentan    · Usted desarrolla un nuevo dolor en la rodilla, cadera o espalda  · Usted tiene preguntas o inquietudes acerca de valenzuela condición o cuidado  Cómo hacer ejercicios para la fascitis plantar:  Pregunte a valenzuela médico cuándo debe comenzar a hacer estos ejercicios y con qué frecuencia debe realizarlos  · Estiramiento del talón:  Párese derecho con las jose sobre la pared  Coloque la pierna lesionada ligeramente detrás de la otra pierna  Mantenga los [employer], inclínese hacia adelante y doble ambas rodillas  Sostenga está posición por 30 segundos  · Estiramiento de la pantorrilla:  Párese derecho con las jose sobre la pared  Altavista un paso hacia adelante de forma que valenzuela pie no lastimado se encuentre en frente de valenzuela pie lastimado  Mantenga la pierna de adelante doblada y la pierna de atrás derecha  Cuidadosamente inclínese hacia adelante hasta que sienta valenzuela pantorrilla estirarse  Sostenga la tensión por 27 segundos y [employer]  · Estiramiento de la fascia plantar sentado:  Siéntese en anival superficie firme, ingris el piso o anival alfombra  Extienda las piernas [employer]  Eleve el pie lesionado unas pocas pulgadas por encima del suelo  Mantenga valenzuela pierna derecha  Agárrese los dedos del pie lesionado y llévelos hacia usted  Con la otra mano, tóquese la fascia plantar  Debe sentir que hace presión hacia afuera  Sostenga está posición por 30 segundos   Si no puede llegar a los dedos, coloque anival toalla o anival corbata alrededor de valenzuela pie  Tire suavemente de la toalla o la corbata y flexione los dedos [place] usted  · Levantamiento de talones:  Párese sobre la pierna lesionada  Levante la otra pierna del suelo  Agárrese de anival baranda o anival pared para mantener el equilibrio  Levántese lentamente [employer] dedos de la pierna lesionada  Sostenga está posición por 5 segundos  Baje el talón despacio [employer]  · Enrollar los dedos del pie:  Coloque anival toalla sobre el piso  Ponga valenzuela pie plano sobre la toalla  Agarre la toalla con los dedos del pie  Levante la toalla con los dedos del pie  Acuda a jaimee consultas de control con valenzuela médico según le indicaron  Anote jaimee preguntas para que se acuerde de hacerlas perlita jaimee visitas  © 2017 [facility] [facility] Information is for End User's use only and may not be sold, redistributed or otherwise used for commercial purposes  All illustrations and images included in CareNotes® are the copyrighted property of A D A M , Inc  or Abdiel Warren  Esta información es sólo para uso en educación  Valenzuela intención no es darle un consejo médico sobre enfermedades o tratamientos  Colsulte con valenzuela Verlon Damián farmacéutico antes de seguir cualquier régimen médico para saber si es seguro y efectivo para usted

## 2020-07-20 NOTE — PROGRESS NOTES
Assessment/Plan:    Hypertension, essential, benign  At goal per JNC 8 guidelines:Yes  Current Medication regime includes ACEI/ARB: Yes  Microalb/Cr ratio:  Lab Results   Component Value Date    MICROALBCRE 12 11/21/2018     ASCVD risk:  Statin currently used: Yes  Current Alcohol Usage: No, Counseled on usage N/A  Current Cigarette smoker: No, ready to quit N/A, Counseled on usage N/A  Current diet involves salt restriction: practices moderation  Currently Physical Active: Yes, walks 3-4x week    Burning sensation of feet  Advised using Tylenol winsome Q6-8 for next 3-4 days  Freeze plastic bottles and massage soles of feet BID  Provided exercises in AVS for pt to complete  If no improvements may consider referral to Podiatry       Diagnoses and all orders for this visit:    Encounter for screening mammogram for malignant neoplasm of breast    Hypertension, essential, benign    Burning sensation of feet    Other orders  -     Cancel: Mammo screening bilateral w cad; Future          Subjective:      Patient ID: Daiana Fu is a 76 y o  female  Daiana Fu is a 76 y o  Female, presents to the clinic today for one-month history of bilateral leg and foot pain  Patient describes the pain as burning and most significant at night  Daughter is present and states that pain is chronic but intermittent  Usually last for 2-3 days and goes away on its own  She has a past hx of RLS, and was using medication in the past but had stopped as her sx got better  She feels that this is similar but its worse than before  She does not have difficulty with sleeping           The following portions of the patient's history were reviewed and updated as appropriate: She  has a past medical history of Angina at rest [facility]), Coronary artery disease, Depression, Heart attack [facility]), History of angioplasty, History of cardiac cath (2003), History of DVT (deep vein thrombosis) (5/18/2020), HLD (hyperlipidemia), Hyperlipidemia, and Hypertension  Patient Active Problem List    Diagnosis Date Noted    Burning sensation of feet 07/20/2020    Erythema of face 06/15/2020    History of DVT (deep vein thrombosis) 05/18/2020    Left thyroid nodule 04/13/2020    Dental caries 07/17/2019    Nonintractable episodic headache 06/24/2019    Pain of both hip joints 12/10/2018    Acute pain of left knee 12/10/2018    Hypercholesterolemia 12/10/2018    Recurrent deep venous thrombosis (HCC) 04/20/2018    Restless leg syndrome 02/08/2018    Allergic rhinitis 09/30/2016    Microscopic hematuria 09/13/2016    Atrophic vaginitis 08/23/2016    Uterovaginal prolapse, incomplete 08/23/2016    Fatigue 06/29/2016    Edema, lower extremity 08/04/2015    Glaucoma, open angle 05/28/2015    Hearing difficulty 05/28/2015    CAD S/P percutaneous coronary angioplasty 05/26/2015    Angina, class III ([facility] [address] ) 05/26/2015    Coronary artery disease 05/19/2015    Hypertension, essential, benign 05/19/2015    Acid reflux disease 05/19/2015    Dyslipidemia 05/19/2015    Osteoarthritis of knee 05/19/2015    Scoliosis 05/19/2015     She  has a past surgical history that includes Cardiac surgery (2000); Cholecystectomy; EAR SURGERY (1984); pr colonoscopy flx dx w/collj spec when pfrmd (N/A, 6/22/2017); Coronary angioplasty with stent; Cardiac catheterization; Coronary angioplasty with stent; and US guided thyroid biopsy (5/29/2020)  Her family history includes Diabetes in her mother; Heart attack in her mother; Heart disease in her family; Hypertension in her family; Hypothyroidism in her sister; Stroke in her father  She  reports that she has never smoked  She has never used smokeless tobacco  She reports that she does not drink alcohol or use drugs    Current Outpatient Medications   Medication Sig Dispense Refill    acetaminophen (TYLENOL) 500 mg tablet Take 500 mg by mouth every 6 (six) hours as needed for mild pain      aluminum-magnesium hydroxide 200-200 MG/5ML suspension Take 5 mL by mouth every 6 (six) hours as needed for heartburn      apixaban (Eliquis) 5 mg Take 1 tablet (5 mg total) by mouth every 12 (twelve) hours 180 tablet 0    aspirin 81 MG tablet Take 1 tablet by mouth daily      atorvastatin (LIPITOR) 80 mg tablet take 1 tablet by mouth once daily 90 tablet 0    conjugated estrogens (PREMARIN) vaginal cream Apply a pea size amount of the estrogen cream to the opening of the vagina three nights per week   fluticasone (FLONASE) 50 mcg/act nasal spray 1 spray into each nostril daily 1 Bottle 1    latanoprost (XALATAN) 0 005 % ophthalmic solution 1 drop daily at bedtime      losartan (COZAAR) 100 MG tablet Take 1 tablet (100 mg total) by mouth daily 90 tablet 0    meclizine (ANTIVERT) 12 5 MG tablet Take 1 tablet (12 5 mg total) by mouth every 8 (eight) hours as needed for dizziness 20 tablet 0    metoprolol tartrate (LOPRESSOR) 50 mg tablet Take 1 tablet (50 mg total) by mouth every 12 (twelve) hours 60 tablet 2    nitroglycerin (NITROSTAT) 0 4 mg SL tablet Place 1 tablet (0 4 mg total) under the tongue every 5 (five) minutes as needed for chest pain 90 tablet 0    omeprazole (PriLOSEC) 40 MG capsule take 1 capsule by mouth once daily 30 capsule 1    RA LORATADINE 10 MG tablet take 1 tablet by mouth once daily 90 tablet 1    ranolazine (Ranexa) 500 mg 12 hr tablet Take 1 tablet (500 mg total) by mouth 2 (two) times a day 60 tablet 8     No current facility-administered medications for this visit        Current Outpatient Medications on File Prior to Visit   Medication Sig    acetaminophen (TYLENOL) 500 mg tablet Take 500 mg by mouth every 6 (six) hours as needed for mild pain    aluminum-magnesium hydroxide 200-200 MG/5ML suspension Take 5 mL by mouth every 6 (six) hours as needed for heartburn    apixaban (Eliquis) 5 mg Take 1 tablet (5 mg total) by mouth every 12 (twelve) hours    aspirin 81 MG tablet Take 1 tablet by mouth daily    atorvastatin (LIPITOR) 80 mg tablet take 1 tablet by mouth once daily    conjugated estrogens (PREMARIN) vaginal cream Apply a pea size amount of the estrogen cream to the opening of the vagina three nights per week   fluticasone (FLONASE) 50 mcg/act nasal spray 1 spray into each nostril daily    latanoprost (XALATAN) 0 005 % ophthalmic solution 1 drop daily at bedtime    losartan (COZAAR) 100 MG tablet Take 1 tablet (100 mg total) by mouth daily    meclizine (ANTIVERT) 12 5 MG tablet Take 1 tablet (12 5 mg total) by mouth every 8 (eight) hours as needed for dizziness    metoprolol tartrate (LOPRESSOR) 50 mg tablet Take 1 tablet (50 mg total) by mouth every 12 (twelve) hours    nitroglycerin (NITROSTAT) 0 4 mg SL tablet Place 1 tablet (0 4 mg total) under the tongue every 5 (five) minutes as needed for chest pain    omeprazole (PriLOSEC) 40 MG capsule take 1 capsule by mouth once daily    RA LORATADINE 10 MG tablet take 1 tablet by mouth once daily    ranolazine (Ranexa) 500 mg 12 hr tablet Take 1 tablet (500 mg total) by mouth 2 (two) times a day     No current facility-administered medications on file prior to visit  She is allergic to ibuprofen       Review of Systems   Constitutional: Negative for chills, diaphoresis and fever  Respiratory: Negative for cough and shortness of breath  Cardiovascular: Negative for chest pain  Gastrointestinal: Negative for abdominal pain, diarrhea, nausea and vomiting  Genitourinary: Negative for dysuria  Musculoskeletal: Negative for arthralgias, gait problem and myalgias  Neurological: Negative for dizziness, weakness and headaches  Tingling, pain and burning in the feet bilaterally  Psychiatric/Behavioral: Negative for suicidal ideas           Objective:      /74 (BP Location: Right arm, Patient Position: Sitting, Cuff Size: Large)   Pulse 55   Temp 97 6 °F (36 4 °C)   Resp 16   Wt 84 8 kg (187 lb)   SpO2 98%   BMI 34 20 kg/m²          Physical Exam   Constitutional: She is oriented to person, place, and time  She appears well-developed and well-nourished  No distress  Eyes: Conjunctivae and EOM are normal  No scleral icterus  Neck: Normal range of motion  Neck supple  Cardiovascular: Normal rate and normal heart sounds  Exam reveals no friction rub  No murmur heard  Pulmonary/Chest: Effort normal and breath sounds normal  No respiratory distress  She has no wheezes  Abdominal: Soft  There is no tenderness  Musculoskeletal: Normal range of motion  She exhibits tenderness  She exhibits no edema  Tenderness localized to plantar surfaces of both feet  Neurological: She is alert and oriented to person, place, and time  Skin: Skin is warm  No rash noted  Psychiatric: She has a normal mood and affect  Vitals reviewed

## 2020-07-20 NOTE — ASSESSMENT & PLAN NOTE
At goal per JNC 8 guidelines:Yes  Current Medication regime includes ACEI/ARB: Yes  Microalb/Cr ratio:  Lab Results   Component Value Date    MICROALBCRE 12 11/21/2018     ASCVD risk:  Statin currently used: Yes  Current Alcohol Usage: No, Counseled on usage N/A  Current Cigarette smoker: No, ready to quit N/A, Counseled on usage N/A  Current diet involves salt restriction: practices moderation  Currently Physical Active: Yes, walks 3-4x week

## 2020-08-03 DIAGNOSIS — K21.9 GASTROESOPHAGEAL REFLUX DISEASE, ESOPHAGITIS PRESENCE NOT SPECIFIED: ICD-10-CM

## 2020-08-03 DIAGNOSIS — I82.409 RECURRENT DEEP VENOUS THROMBOSIS (HCC): ICD-10-CM

## 2020-08-03 DIAGNOSIS — J30.1 ALLERGIC RHINITIS DUE TO POLLEN, UNSPECIFIED SEASONALITY: ICD-10-CM

## 2020-08-03 RX ORDER — LORATADINE 10 MG/1
TABLET ORAL
Qty: 90 TABLET | Refills: 1 | Status: SHIPPED | OUTPATIENT
Start: 2020-08-03 | End: 2021-01-14

## 2020-08-03 RX ORDER — OMEPRAZOLE 40 MG/1
CAPSULE, DELAYED RELEASE ORAL
Qty: 30 CAPSULE | Refills: 1 | Status: SHIPPED | OUTPATIENT
Start: 2020-08-03 | End: 2020-10-13

## 2020-08-03 RX ORDER — APIXABAN 5 MG/1
TABLET, FILM COATED ORAL
Qty: 180 TABLET | Refills: 0 | Status: SHIPPED | OUTPATIENT
Start: 2020-08-03 | End: 2020-11-15

## 2020-09-02 ENCOUNTER — TELEPHONE (OUTPATIENT)
Dept: CARDIOLOGY CLINIC | Facility: CLINIC | Age: 65
End: 2020-09-02

## 2020-09-10 DIAGNOSIS — E78.5 HYPERLIPIDEMIA, UNSPECIFIED HYPERLIPIDEMIA TYPE: ICD-10-CM

## 2020-09-14 RX ORDER — ATORVASTATIN CALCIUM 80 MG/1
TABLET, FILM COATED ORAL
Qty: 90 TABLET | Refills: 0 | Status: SHIPPED | OUTPATIENT
Start: 2020-09-14 | End: 2020-12-17

## 2020-09-21 ENCOUNTER — CLINICAL SUPPORT (OUTPATIENT)
Dept: CARDIOLOGY CLINIC | Facility: CLINIC | Age: 65
End: 2020-09-21
Payer: COMMERCIAL

## 2020-09-21 VITALS
DIASTOLIC BLOOD PRESSURE: 76 MMHG | HEIGHT: 62 IN | HEART RATE: 53 BPM | WEIGHT: 189.2 LBS | TEMPERATURE: 97.7 F | BODY MASS INDEX: 34.82 KG/M2 | SYSTOLIC BLOOD PRESSURE: 138 MMHG

## 2020-09-21 DIAGNOSIS — I10 HYPERTENSION, ESSENTIAL, BENIGN: ICD-10-CM

## 2020-09-21 DIAGNOSIS — Z01.810 PRE-OPERATIVE CARDIOVASCULAR EXAMINATION: Primary | ICD-10-CM

## 2020-09-21 PROCEDURE — 93000 ELECTROCARDIOGRAM COMPLETE: CPT | Performed by: INTERNAL MEDICINE

## 2020-09-21 PROCEDURE — 99211 OFF/OP EST MAY X REQ PHY/QHP: CPT | Performed by: INTERNAL MEDICINE

## 2020-09-21 PROCEDURE — 1036F TOBACCO NON-USER: CPT | Performed by: INTERNAL MEDICINE

## 2020-09-21 NOTE — PROGRESS NOTES
Pt is here today under the direction of Dr Marilia Light for an ekg for cardiac clearance for cataract removal on L and R eye  Pt has no cardiac complaints  Reviewed list of medications with pt  ekg was reviewed by Dr Mary Jo Nuñez  Will scan ekg and send to Dr Marilia Light for review

## 2020-09-22 ENCOUNTER — CONSULT (OUTPATIENT)
Dept: FAMILY MEDICINE CLINIC | Facility: CLINIC | Age: 65
End: 2020-09-22

## 2020-09-22 VITALS
DIASTOLIC BLOOD PRESSURE: 74 MMHG | HEIGHT: 62 IN | HEART RATE: 67 BPM | WEIGHT: 189 LBS | OXYGEN SATURATION: 98 % | TEMPERATURE: 97.6 F | BODY MASS INDEX: 34.78 KG/M2 | SYSTOLIC BLOOD PRESSURE: 136 MMHG | RESPIRATION RATE: 18 BRPM

## 2020-09-22 DIAGNOSIS — Z01.818 PREOP GENERAL PHYSICAL EXAM: Primary | ICD-10-CM

## 2020-09-22 DIAGNOSIS — I10 HYPERTENSION, ESSENTIAL, BENIGN: ICD-10-CM

## 2020-09-22 PROCEDURE — 1036F TOBACCO NON-USER: CPT | Performed by: FAMILY MEDICINE

## 2020-09-22 PROCEDURE — 99214 OFFICE O/P EST MOD 30 MIN: CPT | Performed by: FAMILY MEDICINE

## 2020-09-22 PROCEDURE — 3008F BODY MASS INDEX DOCD: CPT | Performed by: FAMILY MEDICINE

## 2020-09-22 NOTE — ASSESSMENT & PLAN NOTE
Blood pressure is well controlled   Exam is negative for any acute findings  Patient denies any alarming symptoms   Cleared by cardiologyyesterday  Cleared for surgery based on current physical exam

## 2020-09-22 NOTE — PROGRESS NOTES
Assessment/Plan:    Preop general physical exam  Blood pressure is well controlled   Exam is negative for any acute findings  Patient denies any alarming symptoms   Cleared by cardiologyyesterday  Cleared for surgery based on current physical exam    Hypertension, essential, benign  /74, at goal   Continue Salt restriction diet  Continue Losartan as prescribed       Diagnoses and all orders for this visit:    Preop general physical exam    Hypertension, essential, benign    Other orders  -     Cancel: Basic metabolic panel; Future        Subjective:      Patient ID: Pipe Freeman is a 76 y o  female  HPI    Patient presents for pre-op physical exam prior to bilateral cataract surgery, right eye on 10/5/2020 and left eye on 10/19/2020  Pt denies any allergies to medication or anesthesia  Patient saw cardiology yesterday, EKG  as normal, cleared from a cardiology perspective  Hypertension is controlled at 136/74 today      Pt denies any symptoms currently: no SOB, CP, headache, eye pain, abdominourinary symptoms      The following portions of the patient's history were reviewed and updated as appropriate: She  has a past medical history of Angina at rest [facility]), Coronary artery disease, Depression, Heart attack ([facility] [address] ), History of angioplasty, History of cardiac cath (2003), History of DVT (deep vein thrombosis) (5/18/2020), HLD (hyperlipidemia), Hyperlipidemia, and Hypertension    She   Patient Active Problem List    Diagnosis Date Noted    Preop general physical exam 09/22/2020    Burning sensation of feet 07/20/2020    Erythema of face 06/15/2020    History of DVT (deep vein thrombosis) 05/18/2020    Left thyroid nodule 04/13/2020    Dental caries 07/17/2019    Nonintractable episodic headache 06/24/2019    Pain of both hip joints 12/10/2018    Acute pain of left knee 12/10/2018    Hypercholesterolemia 12/10/2018    Recurrent deep venous thrombosis (HCC) 04/20/2018    Restless leg syndrome 02/08/2018    Allergic rhinitis 09/30/2016    Microscopic hematuria 09/13/2016    Atrophic vaginitis 08/23/2016    Uterovaginal prolapse, incomplete 08/23/2016    Fatigue 06/29/2016    Edema, lower extremity 08/04/2015    Glaucoma, open angle 05/28/2015    Hearing difficulty 05/28/2015    CAD S/P percutaneous coronary angioplasty 05/26/2015    Angina, class III ([facility] [address] ) 05/26/2015    Coronary artery disease 05/19/2015    Hypertension, essential, benign 05/19/2015    Acid reflux disease 05/19/2015    Dyslipidemia 05/19/2015    Osteoarthritis of knee 05/19/2015    Scoliosis 05/19/2015     She  has a past surgical history that includes Cardiac surgery (2000); Cholecystectomy; EAR SURGERY (1984); pr colonoscopy flx dx w/collj spec when pfrmd (N/A, 6/22/2017); Coronary angioplasty with stent; Cardiac catheterization; Coronary angioplasty with stent; and US guided thyroid biopsy (5/29/2020)  Her family history includes Diabetes in her mother; Heart attack in her mother; Heart disease in her family; Hypertension in her family; Hypothyroidism in her sister; Stroke in her father  She  reports that she has never smoked  She has never used smokeless tobacco  She reports that she does not drink alcohol or use drugs  Current Outpatient Medications   Medication Sig Dispense Refill    acetaminophen (TYLENOL) 500 mg tablet Take 500 mg by mouth every 6 (six) hours as needed for mild pain      aluminum-magnesium hydroxide 200-200 MG/5ML suspension Take 5 mL by mouth every 6 (six) hours as needed for heartburn      aspirin 81 MG tablet Take 1 tablet by mouth daily      atorvastatin (LIPITOR) 80 mg tablet take 1 tablet by mouth once daily 90 tablet 0    conjugated estrogens (PREMARIN) vaginal cream Apply a pea size amount of the estrogen cream to the opening of the vagina three nights per week        fluticasone (FLONASE) 50 mcg/act nasal spray 1 spray into each nostril daily 1 Bottle 1    latanoprost (XALATAN) 0 005 % ophthalmic solution 1 drop daily at bedtime      loratadine (CLARITIN) 10 mg tablet take 1 tablet by mouth once daily 90 tablet 1    losartan (COZAAR) 100 MG tablet Take 1 tablet (100 mg total) by mouth daily 90 tablet 0    meclizine (ANTIVERT) 12 5 MG tablet Take 1 tablet (12 5 mg total) by mouth every 8 (eight) hours as needed for dizziness 20 tablet 0    metoprolol tartrate (LOPRESSOR) 50 mg tablet Take 1 tablet (50 mg total) by mouth every 12 (twelve) hours 60 tablet 2    nitroglycerin (NITROSTAT) 0 4 mg SL tablet Place 1 tablet (0 4 mg total) under the tongue every 5 (five) minutes as needed for chest pain 90 tablet 0    omeprazole (PriLOSEC) 40 MG capsule take 1 capsule by mouth once daily 30 capsule 1    ranolazine (Ranexa) 500 mg 12 hr tablet Take 1 tablet (500 mg total) by mouth 2 (two) times a day 60 tablet 8    Eliquis 5 MG take 1 tablet by mouth every 12 hours 180 tablet 0     No current facility-administered medications for this visit  She is allergic to ibuprofen       Review of Systems   Constitutional: Negative for activity change, appetite change, chills, fatigue and fever  HENT: Negative for congestion, ear pain, rhinorrhea and sore throat  Eyes: Negative for pain and visual disturbance  Respiratory: Negative for cough, chest tightness and shortness of breath  Cardiovascular: Negative for chest pain, palpitations and leg swelling  Gastrointestinal: Negative for abdominal pain, constipation, diarrhea, nausea and vomiting  Endocrine: Negative for polydipsia and polyuria  Genitourinary: Negative for difficulty urinating, dysuria, frequency, hematuria and urgency  Musculoskeletal: Negative for gait problem and neck pain  Skin: Negative for color change, pallor and rash  Neurological: Negative for dizziness, tremors, syncope, facial asymmetry, speech difficulty, weakness, light-headedness, numbness and headaches           Objective:      /74 (BP Location: Left arm, Patient Position: Sitting, Cuff Size: Standard)   Pulse 67   Temp 97 6 °F (36 4 °C) (Temporal)   Resp 18   Ht 5' 2" (1 575 m)   Wt 85 7 kg (189 lb)   SpO2 98%   BMI 34 57 kg/m²        Physical Exam  Vitals signs reviewed  Constitutional:       General: She is not in acute distress  Appearance: She is not ill-appearing  HENT:      Head: Normocephalic and atraumatic  Comments: Bilateral cataracts noted     Nose: No congestion or rhinorrhea  Mouth/Throat:      Mouth: Mucous membranes are moist       Pharynx: No oropharyngeal exudate or posterior oropharyngeal erythema  Eyes:      General:         Right eye: No discharge  Left eye: No discharge  Extraocular Movements: Extraocular movements intact  Conjunctiva/sclera: Conjunctivae normal       Pupils: Pupils are equal, round, and reactive to light  Neck:      Musculoskeletal: Neck supple  Thyroid: No thyromegaly  Cardiovascular:      Rate and Rhythm: Normal rate and regular rhythm  Heart sounds: Normal heart sounds  No murmur  Pulmonary:      Effort: Pulmonary effort is normal  No respiratory distress  Breath sounds: Normal breath sounds  No wheezing  Abdominal:      General: Bowel sounds are normal  There is no distension  Palpations: Abdomen is soft  Tenderness: There is no abdominal tenderness  There is no right CVA tenderness, left CVA tenderness or guarding  Musculoskeletal:         General: No swelling or tenderness  Right lower leg: No edema  Left lower leg: No edema  Lymphadenopathy:      Cervical: No cervical adenopathy  Skin:     General: Skin is warm  Findings: No rash  Neurological:      Mental Status: She is alert and oriented to person, place, and time  Psychiatric:         Behavior: Behavior normal          Thought Content:  Thought content normal

## 2020-09-22 NOTE — PROGRESS NOTES
Assessment/Plan:    No problem-specific Assessment & Plan notes found for this encounter  Diagnoses and all orders for this visit:    Pre-operative clearance  -     Basic metabolic panel; Future    Hypertension, essential, benign        Subjective:      Patient ID: Amish Small is a 76 y o  female  HPI    Patient presents for pre-op clearnace for bilateral cataract surgery, right eye on 10/5/2020 and left eye on 10/19/2020  Pt denies any allergies to medication or anesthesia  Patient saw cardiology yesterday, EKG  as normal, cleared from a cardiology perspective  Hypertension is controlled at 136/74 today  Pt denies any symptoms currently: no SOB, CP, headache, eye pain, abdominourinary symptoms  The following portions of the patient's history were reviewed and updated as appropriate:   She  has a past medical history of Angina at rest [facility]), Coronary artery disease, Depression, Heart attack ([facility] [address] ), History of angioplasty, History of cardiac cath (2003), History of DVT (deep vein thrombosis) (5/18/2020), HLD (hyperlipidemia), Hyperlipidemia, and Hypertension    She   Patient Active Problem List    Diagnosis Date Noted    Pre-operative clearance 09/22/2020    Burning sensation of feet 07/20/2020    Erythema of face 06/15/2020    History of DVT (deep vein thrombosis) 05/18/2020    Left thyroid nodule 04/13/2020    Dental caries 07/17/2019    Nonintractable episodic headache 06/24/2019    Pain of both hip joints 12/10/2018    Acute pain of left knee 12/10/2018    Hypercholesterolemia 12/10/2018    Recurrent deep venous thrombosis (HCC) 04/20/2018    Restless leg syndrome 02/08/2018    Allergic rhinitis 09/30/2016    Microscopic hematuria 09/13/2016    Atrophic vaginitis 08/23/2016    Uterovaginal prolapse, incomplete 08/23/2016    Fatigue 06/29/2016    Edema, lower extremity 08/04/2015    Glaucoma, open angle 05/28/2015    Hearing difficulty 05/28/2015    CAD S/P percutaneous coronary angioplasty 05/26/2015    Angina, class III ([facility] [address] ) 05/26/2015    Coronary artery disease 05/19/2015    Hypertension, essential, benign 05/19/2015    Acid reflux disease 05/19/2015    Dyslipidemia 05/19/2015    Osteoarthritis of knee 05/19/2015    Scoliosis 05/19/2015     She  has a past surgical history that includes Cardiac surgery (2000); Cholecystectomy; EAR SURGERY (1984); pr colonoscopy flx dx w/collj spec when pfrmd (N/A, 6/22/2017); Coronary angioplasty with stent; Cardiac catheterization; Coronary angioplasty with stent; and US guided thyroid biopsy (5/29/2020)  Her family history includes Diabetes in her mother; Heart attack in her mother; Heart disease in her family; Hypertension in her family; Hypothyroidism in her sister; Stroke in her father  She  reports that she has never smoked  She has never used smokeless tobacco  She reports that she does not drink alcohol or use drugs  Current Outpatient Medications   Medication Sig Dispense Refill    acetaminophen (TYLENOL) 500 mg tablet Take 500 mg by mouth every 6 (six) hours as needed for mild pain      aluminum-magnesium hydroxide 200-200 MG/5ML suspension Take 5 mL by mouth every 6 (six) hours as needed for heartburn      aspirin 81 MG tablet Take 1 tablet by mouth daily      atorvastatin (LIPITOR) 80 mg tablet take 1 tablet by mouth once daily 90 tablet 0    conjugated estrogens (PREMARIN) vaginal cream Apply a pea size amount of the estrogen cream to the opening of the vagina three nights per week        fluticasone (FLONASE) 50 mcg/act nasal spray 1 spray into each nostril daily 1 Bottle 1    latanoprost (XALATAN) 0 005 % ophthalmic solution 1 drop daily at bedtime      loratadine (CLARITIN) 10 mg tablet take 1 tablet by mouth once daily 90 tablet 1    losartan (COZAAR) 100 MG tablet Take 1 tablet (100 mg total) by mouth daily 90 tablet 0    meclizine (ANTIVERT) 12 5 MG tablet Take 1 tablet (12 5 mg total) by mouth every 8 (eight) hours as needed for dizziness 20 tablet 0    metoprolol tartrate (LOPRESSOR) 50 mg tablet Take 1 tablet (50 mg total) by mouth every 12 (twelve) hours 60 tablet 2    nitroglycerin (NITROSTAT) 0 4 mg SL tablet Place 1 tablet (0 4 mg total) under the tongue every 5 (five) minutes as needed for chest pain 90 tablet 0    omeprazole (PriLOSEC) 40 MG capsule take 1 capsule by mouth once daily 30 capsule 1    ranolazine (Ranexa) 500 mg 12 hr tablet Take 1 tablet (500 mg total) by mouth 2 (two) times a day 60 tablet 8    Eliquis 5 MG take 1 tablet by mouth every 12 hours 180 tablet 0     No current facility-administered medications for this visit  She is allergic to ibuprofen       Review of Systems   Constitutional: Negative for activity change, appetite change, chills, fatigue and fever  HENT: Negative for congestion, ear pain, rhinorrhea and sore throat  Eyes: Negative for pain and visual disturbance  Respiratory: Negative for cough, chest tightness and shortness of breath  Cardiovascular: Negative for chest pain, palpitations and leg swelling  Gastrointestinal: Negative for abdominal pain, constipation, diarrhea, nausea and vomiting  Endocrine: Negative for polydipsia and polyuria  Genitourinary: Negative for difficulty urinating, dysuria, frequency, hematuria and urgency  Musculoskeletal: Negative for gait problem and neck pain  Skin: Negative for pallor, rash and wound  Neurological: Negative for dizziness, tremors, syncope, facial asymmetry, speech difficulty, weakness, light-headedness, numbness and headaches  Psychiatric/Behavioral: Negative for confusion  Objective:      /74 (BP Location: Left arm, Patient Position: Sitting, Cuff Size: Standard)   Pulse 67   Temp 97 6 °F (36 4 °C) (Temporal)   Resp 18   Ht 5' 2" (1 575 m)   Wt 85 7 kg (189 lb)   SpO2 98%   BMI 34 57 kg/m²          Physical Exam  Vitals signs reviewed     Constitutional: General: She is not in acute distress  Appearance: She is not diaphoretic  HENT:      Head: Normocephalic and atraumatic  Right Ear: Tympanic membrane normal       Left Ear: Tympanic membrane normal       Nose: Nose normal       Mouth/Throat:      Mouth: Mucous membranes are moist       Pharynx: No oropharyngeal exudate  Eyes:      Extraocular Movements: Extraocular movements intact  Conjunctiva/sclera: Conjunctivae normal       Pupils: Pupils are equal, round, and reactive to light  Neck:      Musculoskeletal: Neck supple  No neck rigidity or muscular tenderness  Thyroid: No thyromegaly  Vascular: No carotid bruit  Cardiovascular:      Rate and Rhythm: Normal rate and regular rhythm  Pulses: Normal pulses  Heart sounds: Normal heart sounds  No murmur  Pulmonary:      Effort: Pulmonary effort is normal  No respiratory distress  Breath sounds: Normal breath sounds  No wheezing  Abdominal:      General: Bowel sounds are normal  There is no distension  Palpations: Abdomen is soft  Tenderness: There is no abdominal tenderness  There is no right CVA tenderness, left CVA tenderness or guarding  Musculoskeletal:         General: No swelling, tenderness or deformity  Right lower leg: No edema  Left lower leg: No edema  Lymphadenopathy:      Cervical: No cervical adenopathy  Skin:     General: Skin is warm  Findings: No rash  Neurological:      Mental Status: She is alert and oriented to person, place, and time     Psychiatric:         Mood and Affect: Mood normal          Behavior: Behavior normal

## 2020-10-12 DIAGNOSIS — K21.9 GASTROESOPHAGEAL REFLUX DISEASE: ICD-10-CM

## 2020-10-12 DIAGNOSIS — I10 BENIGN ESSENTIAL HYPERTENSION: ICD-10-CM

## 2020-10-13 RX ORDER — OMEPRAZOLE 40 MG/1
CAPSULE, DELAYED RELEASE ORAL
Qty: 30 CAPSULE | Refills: 1 | Status: SHIPPED | OUTPATIENT
Start: 2020-10-13 | End: 2020-12-17

## 2020-10-17 RX ORDER — LOSARTAN POTASSIUM 100 MG/1
TABLET ORAL
Qty: 90 TABLET | Refills: 0 | Status: SHIPPED | OUTPATIENT
Start: 2020-10-17 | End: 2021-02-17

## 2020-10-26 ENCOUNTER — OFFICE VISIT (OUTPATIENT)
Dept: FAMILY MEDICINE CLINIC | Facility: CLINIC | Age: 65
End: 2020-10-26

## 2020-10-26 VITALS
TEMPERATURE: 96.4 F | HEIGHT: 62 IN | RESPIRATION RATE: 18 BRPM | DIASTOLIC BLOOD PRESSURE: 88 MMHG | SYSTOLIC BLOOD PRESSURE: 152 MMHG | HEART RATE: 60 BPM | BODY MASS INDEX: 35.59 KG/M2 | WEIGHT: 193.4 LBS | OXYGEN SATURATION: 99 %

## 2020-10-26 DIAGNOSIS — Z20.822 ENCOUNTER FOR LABORATORY TESTING FOR COVID-19 VIRUS: ICD-10-CM

## 2020-10-26 DIAGNOSIS — I10 HYPERTENSION, ESSENTIAL, BENIGN: ICD-10-CM

## 2020-10-26 DIAGNOSIS — L53.9 ERYTHEMA OF FACE: ICD-10-CM

## 2020-10-26 DIAGNOSIS — L71.9 ROSACEA: ICD-10-CM

## 2020-10-26 DIAGNOSIS — Z23 NEED FOR 23-POLYVALENT PNEUMOCOCCAL POLYSACCHARIDE VACCINE: Primary | ICD-10-CM

## 2020-10-26 DIAGNOSIS — U07.1 COVID-19: ICD-10-CM

## 2020-10-26 PROCEDURE — 1160F RVW MEDS BY RX/DR IN RCRD: CPT | Performed by: FAMILY MEDICINE

## 2020-10-26 PROCEDURE — 90471 IMMUNIZATION ADMIN: CPT

## 2020-10-26 PROCEDURE — 90732 PPSV23 VACC 2 YRS+ SUBQ/IM: CPT

## 2020-10-26 PROCEDURE — 99213 OFFICE O/P EST LOW 20 MIN: CPT | Performed by: FAMILY MEDICINE

## 2020-10-26 PROCEDURE — 3008F BODY MASS INDEX DOCD: CPT | Performed by: FAMILY MEDICINE

## 2020-10-26 PROCEDURE — 3077F SYST BP >= 140 MM HG: CPT | Performed by: FAMILY MEDICINE

## 2020-10-26 PROCEDURE — 4040F PNEUMOC VAC/ADMIN/RCVD: CPT | Performed by: FAMILY MEDICINE

## 2020-10-26 PROCEDURE — 3079F DIAST BP 80-89 MM HG: CPT | Performed by: FAMILY MEDICINE

## 2020-10-26 RX ORDER — METRONIDAZOLE 10 MG/G
GEL TOPICAL DAILY
Qty: 45 G | Refills: 0 | Status: SHIPPED | OUTPATIENT
Start: 2020-10-26

## 2020-10-26 RX ORDER — POLYMYXIN B SULFATE AND TRIMETHOPRIM 1; 10000 MG/ML; [USP'U]/ML
SOLUTION OPHTHALMIC
COMMUNITY
Start: 2020-10-21

## 2020-10-26 RX ORDER — ESTRADIOL 0.1 MG/G
CREAM VAGINAL
COMMUNITY
Start: 2020-10-12

## 2020-10-26 RX ORDER — PREDNISOLONE ACETATE 10 MG/ML
SUSPENSION/ DROPS OPHTHALMIC
COMMUNITY
Start: 2020-10-15

## 2020-11-02 ENCOUNTER — LAB (OUTPATIENT)
Dept: LAB | Facility: HOSPITAL | Age: 65
End: 2020-11-02
Payer: COMMERCIAL

## 2020-11-02 DIAGNOSIS — I10 HYPERTENSION, ESSENTIAL, BENIGN: ICD-10-CM

## 2020-11-02 LAB
ALBUMIN SERPL BCP-MCNC: 3.5 G/DL (ref 3.5–5)
ALP SERPL-CCNC: 82 U/L (ref 46–116)
ALT SERPL W P-5'-P-CCNC: 25 U/L (ref 12–78)
ANION GAP SERPL CALCULATED.3IONS-SCNC: 4 MMOL/L (ref 4–13)
AST SERPL W P-5'-P-CCNC: 20 U/L (ref 5–45)
BILIRUB SERPL-MCNC: 0.62 MG/DL (ref 0.2–1)
BUN SERPL-MCNC: 17 MG/DL (ref 5–25)
CALCIUM SERPL-MCNC: 8.9 MG/DL (ref 8.3–10.1)
CHLORIDE SERPL-SCNC: 109 MMOL/L (ref 100–108)
CO2 SERPL-SCNC: 29 MMOL/L (ref 21–32)
CREAT SERPL-MCNC: 0.8 MG/DL (ref 0.6–1.3)
GFR SERPL CREATININE-BSD FRML MDRD: 77 ML/MIN/1.73SQ M
GLUCOSE P FAST SERPL-MCNC: 92 MG/DL (ref 65–99)
POTASSIUM SERPL-SCNC: 4.2 MMOL/L (ref 3.5–5.3)
PROT SERPL-MCNC: 6.8 G/DL (ref 6.4–8.2)
SODIUM SERPL-SCNC: 142 MMOL/L (ref 136–145)

## 2020-11-02 PROCEDURE — 80053 COMPREHEN METABOLIC PANEL: CPT

## 2020-11-02 PROCEDURE — 36415 COLL VENOUS BLD VENIPUNCTURE: CPT

## 2020-11-14 DIAGNOSIS — I82.409 RECURRENT DEEP VENOUS THROMBOSIS (HCC): ICD-10-CM

## 2020-11-15 RX ORDER — APIXABAN 5 MG/1
TABLET, FILM COATED ORAL
Qty: 180 TABLET | Refills: 0 | Status: SHIPPED | OUTPATIENT
Start: 2020-11-15 | End: 2021-02-17

## 2020-12-16 DIAGNOSIS — K21.9 GASTROESOPHAGEAL REFLUX DISEASE: ICD-10-CM

## 2020-12-16 DIAGNOSIS — E78.5 HYPERLIPIDEMIA, UNSPECIFIED HYPERLIPIDEMIA TYPE: ICD-10-CM

## 2020-12-17 RX ORDER — OMEPRAZOLE 40 MG/1
CAPSULE, DELAYED RELEASE ORAL
Qty: 30 CAPSULE | Refills: 1 | Status: SHIPPED | OUTPATIENT
Start: 2020-12-17 | End: 2021-02-17

## 2020-12-17 RX ORDER — ATORVASTATIN CALCIUM 80 MG/1
TABLET, FILM COATED ORAL
Qty: 90 TABLET | Refills: 0 | Status: SHIPPED | OUTPATIENT
Start: 2020-12-17 | End: 2021-03-19

## 2021-01-13 DIAGNOSIS — J30.1 ALLERGIC RHINITIS DUE TO POLLEN, UNSPECIFIED SEASONALITY: ICD-10-CM

## 2021-01-13 DIAGNOSIS — I20.9 ANGINA, CLASS III (HCC): ICD-10-CM

## 2021-01-13 DIAGNOSIS — I10 BENIGN ESSENTIAL HYPERTENSION: ICD-10-CM

## 2021-01-14 RX ORDER — LORATADINE 10 MG/1
TABLET ORAL
Qty: 90 TABLET | Refills: 1 | Status: SHIPPED | OUTPATIENT
Start: 2021-01-14 | End: 2021-07-22 | Stop reason: SDUPTHER

## 2021-01-14 RX ORDER — METOPROLOL TARTRATE 50 MG/1
TABLET, FILM COATED ORAL
Qty: 60 TABLET | Refills: 2 | Status: SHIPPED | OUTPATIENT
Start: 2021-01-14 | End: 2021-04-21

## 2021-02-08 ENCOUNTER — OFFICE VISIT (OUTPATIENT)
Dept: CARDIOLOGY CLINIC | Facility: CLINIC | Age: 66
End: 2021-02-08
Payer: COMMERCIAL

## 2021-02-08 VITALS
WEIGHT: 189 LBS | DIASTOLIC BLOOD PRESSURE: 88 MMHG | BODY MASS INDEX: 34.78 KG/M2 | SYSTOLIC BLOOD PRESSURE: 146 MMHG | HEIGHT: 62 IN | HEART RATE: 56 BPM

## 2021-02-08 DIAGNOSIS — Z01.818 PRE-OPERATIVE CLEARANCE: Primary | ICD-10-CM

## 2021-02-08 DIAGNOSIS — I10 BENIGN ESSENTIAL HYPERTENSION: ICD-10-CM

## 2021-02-08 DIAGNOSIS — I25.10 CORONARY ARTERY DISEASE INVOLVING NATIVE CORONARY ARTERY OF NATIVE HEART WITHOUT ANGINA PECTORIS: ICD-10-CM

## 2021-02-08 PROCEDURE — 93000 ELECTROCARDIOGRAM COMPLETE: CPT | Performed by: INTERNAL MEDICINE

## 2021-02-08 PROCEDURE — 99213 OFFICE O/P EST LOW 20 MIN: CPT | Performed by: INTERNAL MEDICINE

## 2021-02-08 NOTE — PROGRESS NOTES
Cardiology Follow Up    Ney Fuentes  1955  [id number]  [address] [facility] [place]  [address] [facility] [address]  [address] [address]  [phone]    1  Coronary artery disease involving native coronary artery of native heart without angina pectoris  POCT ECG   2  Benign essential hypertension  POCT ECG   3  Pre-operative cardiovascular examination  POCT ECG       Interval History: ***    Patient Active Problem List   Diagnosis    Coronary artery disease    CAD S/P percutaneous coronary angioplasty    Hypertension, essential, benign    Atrophic vaginitis    Angina, class III (HCC)    Allergic rhinitis    Acid reflux disease    Dyslipidemia    Edema, lower extremity    Fatigue    Glaucoma, open angle    Hearing difficulty    Osteoarthritis of knee    Scoliosis    Uterovaginal prolapse, incomplete    Microscopic hematuria    Restless leg syndrome    Recurrent deep venous thrombosis (HCC)    Pain of both hip joints    Acute pain of left knee    Hypercholesterolemia    Nonintractable episodic headache    Need for 23-polyvalent pneumococcal polysaccharide vaccine    Dental caries    Left thyroid nodule    History of DVT (deep vein thrombosis)    Rosacea    Burning sensation of feet    Preop general physical exam    Benign essential hypertension    COVID-19 SCREENING FOR TRAVEL     Past Medical History:   Diagnosis Date    Ang[facility] rest [facility])     Coronary artery disease     stents placed    Depression     resolved 6/29/16    Heart attack ([facility] [address] )     History of angioplasty     History of cardiac cath 2003    History of DVT (deep vein thrombosis) 5/18/2020    8/10/2017 RIGHT LOWER LIMB: There is acute deep vein thrombosis in several gastrocnemius veins from the proximal to the distal calf      HLD (hyperlipidemia)     Hyperlipidemia     Hypertension      Social History     Socioeconomic History    Marital status:       Spouse name: Not on file    Number of children: Not on file    Years of education: Not on file    Highest education level: Not on file   Occupational History    Not on file   Social Needs    Financial resource strain: Not on file    Food insecurity     Worry: Not on file     Inability: Not on file    Transportation needs     Medical: Not on file     Non-medical: Not on file   Tobacco Use    Smoking status: Former Smoker     Types: Cigarettes     Quit date:      Years since quittin 1    Smokeless tobacco: Never Used   Substance and Sexual Activity    Alcohol use: Never     Frequency: Never    Drug use: Never    Sexual activity: Not on file   Lifestyle    Physical activity     Days per week: Not on file     Minutes per session: Not on file    Stress: Not on file   Relationships    Social connections     Talks on phone: Not on file     Gets together: Not on file     Attends [employer] service: Not on file     Active member of club or organization: Not on file     Attends meetings of clubs or organizations: Not on file     Relationship status: Not on file    Intimate partner violence     Fear of current or ex partner: Not on file     Emotionally abused: Not on file     Physically abused: Not on file     Forced sexual activity: Not on file   Other Topics Concern    Not on file   Social History Narrative    ** Merged History Encounter **         Lives with adult children  Yarsanism      Family History   Problem Relation Age of Onset    Diabetes Mother     Heart attack Mother         MI    Stroke Father         CVA    Hypothyroidism Sister     Heart disease Family         cardiovascular disease    Hypertension Family      Past Surgical History:   Procedure Laterality Date    CARDIAC CATHETERIZATION      CARDIAC SURGERY      open heart     CHOLECYSTECTOMY      CORONARY ANGIOPLASTY WITH STENT PLACEMENT      last assessed 16    CORONARY ANGIOPLASTY WITH STENT PLACEMENT      EAR SURGERY  1984    [place] COLONOSCOPY FLX DX W/COLLJ SPEC WHEN PFRMD N/A 6/22/2017    Procedure: EGD AND COLONOSCOPY;  Surgeon: Gabi Stewart MD;  Location: [facility];   Service: Gastroenterology    US GUIDED THYROID BIOPSY  5/29/2020       Current Outpatient Medications:     acetaminophen (TYLENOL) 500 mg tablet, Take 500 mg by mouth every 6 (six) hours as needed for mild pain, Disp: , Rfl:     aluminum-magnesium hydroxide 200-200 MG/5ML suspension, Take 5 mL by mouth every 6 (six) hours as needed for heartburn, Disp: , Rfl:     aspirin 81 MG tablet, Take 1 tablet by mouth daily, Disp: , Rfl:     atorvastatin (LIPITOR) 80 mg tablet, take 1 tablet by mouth once daily, Disp: 90 tablet, Rfl: 0    conjugated estrogens (PREMARIN) vaginal cream, Apply a pea size amount of the estrogen cream to the opening of the vagina three nights per week , Disp: , Rfl:     Eliquis 5 MG, take 1 tablet by mouth every 12 hours, Disp: 180 tablet, Rfl: 0    estradiol (ESTRACE) 0 1 mg/g vaginal cream, , Disp: , Rfl:     fluticasone (FLONASE) 50 mcg/act nasal spray, 1 spray into each nostril daily, Disp: 1 Bottle, Rfl: 1    latanoprost (XALATAN) 0 005 % ophthalmic solution, 1 drop daily at bedtime, Disp: , Rfl:     loratadine (CLARITIN) 10 mg tablet, take 1 tablet by mouth once daily, Disp: 90 tablet, Rfl: 1    losartan (COZAAR) 100 MG tablet, take 1 tablet by mouth once daily, Disp: 90 tablet, Rfl: 0    meclizine (ANTIVERT) 12 5 MG tablet, Take 1 tablet (12 5 mg total) by mouth every 8 (eight) hours as needed for dizziness, Disp: 20 tablet, Rfl: 0    metoprolol tartrate (LOPRESSOR) 50 mg tablet, take 1 tablet by mouth every 12 hours, Disp: 60 tablet, Rfl: 2    metroNIDAZOLE (METROGEL) 1 % gel, Apply topically daily, Disp: 45 g, Rfl: 0    omeprazole (PriLOSEC) 40 MG capsule, take 1 capsule by mouth once daily, Disp: 30 capsule, Rfl: 1    polymyxin b-trimethoprim (POLYTRIM) ophthalmic solution, , Disp: , Rfl:     prednisoLONE acetate (PRED FORTE) 1 % ophthalmic suspension, , Disp: , Rfl:     ranolazine (Ranexa) 500 mg 12 hr tablet, Take 1 tablet (500 mg total) by mouth 2 (two) times a day, Disp: 60 tablet, Rfl: 8    nitroglycerin (NITROSTAT) 0 4 mg SL tablet, Place 1 tablet (0 4 mg total) under the tongue every 5 (five) minutes as needed for chest pain (Patient not taking: Reported on 2/8/2021), Disp: 90 tablet, Rfl: 0  Allergies   Allergen Reactions    Ibuprofen        Labs:{Recent [id number]::"ROQ applicable"}  Imaging: No results found      Review of Systems:  Review of Systems    Physical Exam:  Physical Exam    Discussion/Summary:***

## 2021-02-08 NOTE — PATIENT INSTRUCTIONS
2gm sodium low fat low cholesterol diet, eating fresh is best, fresh fruit, fresh vegetables, lean protein       Exercise nuclear stress test   Hold Metoprolol the morning of the stress test

## 2021-02-08 NOTE — PROGRESS NOTES
Cardiology Consultation for pre operative clearance     Karl Edwards  [id number]  1955  HEART & VASCULAR [facility] [place]  [address] [address]    1  Coronary artery disease involving native coronary artery of native heart without angina pectoris  POCT ECG    NM myocardial perfusion spect (rx stress and/or rest)   2  Benign essential hypertension  POCT ECG   3  Pre-operative cardiovascular examination  POCT ECG   4  Pre-operative clearance  NM myocardial perfusion spect (rx stress and/or rest)         Ms Kellie Ward presents to our office today for pre operative clearance  She is to undergo bilateral lid Mullerectomy by Dr Carey Barraza primary language is Sapnish  Ms So Duarte is accompanied by her daughter who is interpreting for Vashti Eason today  Madi admits to 2 pillow orthopnea, snoring at night and daytime fatigue  Sebasmyla Eason is walking 45 minutes to 60 minutes three times a week  Sebasmyla Eason admits to mid sternal chest pressure, 5/10, radiating to her back, relieved with rest   Once she has rested she is able to resume walking  CP not associated with dyspnea, diaphoresis or nausea  HPI:  GERD  CAD  Remote CABG Lima to LAD,  Remote hx of Stent to prox LAD, Prox RCA stent, Right posterolateral segment sp stent  9/25/15 % stenosis in prox LAD at the site of the previous stent,   Sp PCI to the 90% lesion in the 1st diag, Xience Aplipen Rx EVELYNE       Hypertension  Hyperlipidemia 4/26/20 , , HDL 56, LDL 91   DVT  OA knee    HgbA1C 5 7 on 8/14/17 1/18/19 TTE showed LVEF 50%, NRWMA, Grade 2 DD, mild MR, mild TR, No AS  Patient Active Problem List   Diagnosis    Coronary artery disease    CAD S/P percutaneous coronary angioplasty    Hypertension, essential, benign    Atrophic vaginitis    Angina, class III (HCC)    Allergic rhinitis    Acid reflux disease    Dyslipidemia    Edema, lower extremity    Fatigue    Glaucoma, open angle    Hearing difficulty    Osteoarthritis of knee    Scoliosis    Uterovaginal prolapse, incomplete    Microscopic hematuria    Restless leg syndrome    Recurrent deep venous thrombosis (HCC)    Pain of both hip joints    Acute pain of left knee    Hypercholesterolemia    Nonintractable episodic headache    Need for 23-polyvalent pneumococcal polysaccharide vaccine    Dental caries    Left thyroid nodule    History of DVT (deep vein thrombosis)    Rosacea    Burning sensation of feet    Preop general physical exam    Benign essential hypertension    COVID-19 SCREENING FOR TRAVEL     Past Medical History:   Diagnosis Date    Angina at rest [facility])     Coronary artery disease     stents placed    Depression     resolved 16    Heart attack ([address] )     History of angioplasty     History of cardiac cath     History of DVT (deep vein thrombosis) 2020    8/10/2017 RIGHT LOWER LIMB: There is acute deep vein thrombosis in several gastrocnemius veins from the proximal to the distal calf   HLD (hyperlipidemia)     Hyperlipidemia     Hypertension      Social History     Socioeconomic History    Marital status:       Spouse name: Not on file    Number of children: Not on file    Years of education: Not on file    Highest education level: Not on file   Occupational History    Not on file   Social Needs    Financial resource strain: Not on file    Food insecurity     Worry: Not on file     Inability: Not on file    Transportation needs     Medical: Not on file     Non-medical: Not on file   Tobacco Use    Smoking status: Former Smoker     Types: Cigarettes     Quit date:      Years since quittin     Smokeless tobacco: Never Used   Substance and Sexual Activity    Alcohol use: Never     Frequency: Never    Drug use: Never    Sexual activity: Not on file   Lifestyle    Physical activity     Days per week: Not on file Minutes per session: Not on file    Stress: Not on file   Relationships    Social connections     Talks on phone: Not on file     Gets together: Not on file     Attends Orthodoxy service: Not on file     Active member of club or organization: Not on file     Attends meetings of clubs or organizations: Not on file     Relationship status: Not on file    Intimate partner violence     Fear of current or ex partner: Not on file     Emotionally abused: Not on file     Physically abused: Not on file     Forced sexual activity: Not on file   Other Topics Concern    Not on file   Social History Narrative    ** Merged History Encounter **         Lives with adult children  Judaism      Family History   Problem Relation Age of Onset    Diabetes Mother     Heart attack Mother         MI    Stroke Father         CVA    Hypothyroidism Sister     Heart disease Family         cardiovascular disease    Hypertension Family      Past Surgical History:   Procedure Laterality Date    CARDIAC CATHETERIZATION      CARDIAC SURGERY  2000    open heart     CHOLECYSTECTOMY      CORONARY ANGIOPLASTY WITH STENT PLACEMENT      last assessed 6/29/16    CORONARY ANGIOPLASTY WITH STENT PLACEMENT      EAR SURGERY  1984    MO COLONOSCOPY FLX DX W/COLLJ SPEC WHEN PFRMD N/A 6/22/2017    Procedure: EGD AND COLONOSCOPY;  Surgeon: Alfred Christian MD;  [facility];   Service: Gastroenterology    US GUIDED THYROID BIOPSY  5/29/2020       Current Outpatient Medications:     acetaminophen (TYLENOL) 500 mg tablet, Take 500 mg by mouth every 6 (six) hours as needed for mild pain, Disp: , Rfl:     aluminum-magnesium hydroxide 200-200 MG/5ML suspension, Take 5 mL by mouth every 6 (six) hours as needed for heartburn, Disp: , Rfl:     aspirin 81 MG tablet, Take 1 tablet by mouth daily, Disp: , Rfl:     atorvastatin (LIPITOR) 80 mg tablet, take 1 tablet by mouth once daily, Disp: 90 tablet, Rfl: 0    conjugated estrogens (PREMARIN) vaginal cream, Apply a pea size amount of the estrogen cream to the opening of the vagina three nights per week , Disp: , Rfl:     Eliquis 5 MG, take 1 tablet by mouth every 12 hours, Disp: 180 tablet, Rfl: 0    estradiol (ESTRACE) 0 1 mg/g vaginal cream, , Disp: , Rfl:     fluticasone (FLONASE) 50 mcg/act nasal spray, 1 spray into each nostril daily, Disp: 1 Bottle, Rfl: 1    latanoprost (XALATAN) 0 005 % ophthalmic solution, 1 drop daily at bedtime, Disp: , Rfl:     loratadine (CLARITIN) 10 mg tablet, take 1 tablet by mouth once daily, Disp: 90 tablet, Rfl: 1    losartan (COZAAR) 100 MG tablet, take 1 tablet by mouth once daily, Disp: 90 tablet, Rfl: 0    meclizine (ANTIVERT) 12 5 MG tablet, Take 1 tablet (12 5 mg total) by mouth every 8 (eight) hours as needed for dizziness, Disp: 20 tablet, Rfl: 0    metoprolol tartrate (LOPRESSOR) 50 mg tablet, take 1 tablet by mouth every 12 hours, Disp: 60 tablet, Rfl: 2    metroNIDAZOLE (METROGEL) 1 % gel, Apply topically daily, Disp: 45 g, Rfl: 0    omeprazole (PriLOSEC) 40 MG capsule, take 1 capsule by mouth once daily, Disp: 30 capsule, Rfl: 1    polymyxin b-trimethoprim (POLYTRIM) ophthalmic solution, , Disp: , Rfl:     prednisoLONE acetate (PRED FORTE) 1 % ophthalmic suspension, , Disp: , Rfl:     ranolazine (Ranexa) 500 mg 12 hr tablet, Take 1 tablet (500 mg total) by mouth 2 (two) times a day, Disp: 60 tablet, Rfl: 8    nitroglycerin (NITROSTAT) 0 4 mg SL tablet, Place 1 tablet (0 4 mg total) under the tongue every 5 (five) minutes as needed for chest pain (Patient not taking: Reported on 2/8/2021), Disp: 90 tablet, Rfl: 0  Allergies   Allergen Reactions    Ibuprofen      Vitals:    02/08/21 0754   BP: 146/88   BP Location: Right arm   Cuff Size: Large   Pulse: 56   Weight: 85 7 kg (189 lb)   Height: 5' 2" (1 575 m)       Labs:  No visits with results within 2 Month(s) from this visit     Latest known visit with results is:   Lab on 11/02/2020 Component Date Value    Sodium 11/02/2020 142     Potassium 11/02/2020 4 2     Chloride 11/02/2020 109*    CO2 11/02/2020 29     ANION GAP 11/02/2020 4     BUN 11/02/2020 17     Creatinine 11/02/2020 0 80     Glucose, Fasting 11/02/2020 92     Calcium 11/02/2020 8 9     AST 11/02/2020 20     ALT 11/02/2020 25     Alkaline Phosphatase 11/02/2020 82     Total Protein 11/02/2020 6 8     Albumin 11/02/2020 3 5     Total Bilirubin 11/02/2020 0 62     eGFR 11/02/2020 77      Imaging: No results found  Review of Systems:  Review of Systems   Constitutional: Positive for fatigue  Respiratory: Positive for shortness of breath  Cardiovascular: Positive for chest pain  Musculoskeletal: Positive for arthralgias  All other systems reviewed and are negative  Physical Exam:  Physical Exam  Vitals signs reviewed  Constitutional:       Appearance: She is obese  Cardiovascular:      Rate and Rhythm: Regular rhythm  Bradycardia present  Comments: 1/6 SASCHA  Pulmonary:      Effort: Pulmonary effort is normal       Breath sounds: Normal breath sounds  Abdominal:      General: Bowel sounds are normal       Palpations: Abdomen is soft  Musculoskeletal:      Right lower leg: Edema present  Left lower leg: Edema present  Comments: Trace marbella LE edema    Skin:     General: Skin is warm and dry  Capillary Refill: Capillary refill takes less than 2 seconds  Neurological:      General: No focal deficit present  Mental Status: She is alert and oriented to person, place, and time  Psychiatric:         Mood and Affect: Mood normal          Discussion/Summary:  1  Pre operative clearance for planned bilateral lid Mullerectomy by Dr Kermit Mccarty at [facility] on 2/25/21  Ms Abdirahman Guardado admits to exertional chest pain with walking, relieved with rest   EKG without change from previous, SB with LBBB    I have ordered and Nuclear stress test R/O ischemia prior to the planned procedure  Ms Lind Pallas is aware to hold the Metoprolol the morning of the stress test     2  CAD, Remote CABG Lima to LAD,Remote hx of Stent to prox LAD, Prox RCA stent, Right posterolateral segment sp stent, 9/25/15 [facility] 100% stenosis in prox LAD at the site of the previous stent,   Sp PCI to the 90% lesion in the 1st diag, Xience Aplipen Rx EVELYNE  Continues on metoprolol tartrate 50 mg q 12 hours, losartan 100 mg daily Ranexa 500 mg b i d , aspirin 81 mg daily,  Lipitor 80 mg daily  3  Hypertension- /90, 2gm sodium diet, home monitoring and keep a log , New on losartan 100 mg daily, metoprolol tartrate 50 mg q 12 hours  4  Hyperlipidemia 4/26/20 , , HDL 56, LDL 91-  Continue Lipitor 80 mg daily, low-fat low-cholesterol

## 2021-02-11 ENCOUNTER — TELEPHONE (OUTPATIENT)
Dept: CARDIOLOGY CLINIC | Facility: CLINIC | Age: 66
End: 2021-02-11

## 2021-02-11 NOTE — TELEPHONE ENCOUNTER
Pt scheduled for Upper lid Mullerectomy on 2/25/21  [facility] would like a 4 day hold of Eliquis and 7 day hold of ASA prior to surgery  Please advise

## 2021-02-12 ENCOUNTER — HOSPITAL ENCOUNTER (OUTPATIENT)
Dept: NON INVASIVE DIAGNOSTICS | Facility: CLINIC | Age: 66
Discharge: HOME | End: 2021-02-12
Payer: COMMERCIAL

## 2021-02-12 DIAGNOSIS — Z01.818 PRE-OPERATIVE CLEARANCE: ICD-10-CM

## 2021-02-12 DIAGNOSIS — I82.409 RECURRENT DEEP VENOUS THROMBOSIS (HCC): ICD-10-CM

## 2021-02-12 DIAGNOSIS — I10 BENIGN ESSENTIAL HYPERTENSION: ICD-10-CM

## 2021-02-12 DIAGNOSIS — K21.9 GASTROESOPHAGEAL REFLUX DISEASE: ICD-10-CM

## 2021-02-12 DIAGNOSIS — I25.118 CORONARY ARTERY DISEASE INVOLVING NATIVE HEART WITH OTHER FORM OF ANGINA PECTORIS, UNSPECIFIED VESSEL OR LESION TYPE (HCC): ICD-10-CM

## 2021-02-12 LAB
CHEST PAIN STATEMENT: NORMAL
MAX DIASTOLIC BP: 100 MMHG
MAX HEART RATE: 92 BPM
MAX PREDICTED HEART RATE: 153 BPM
MAX. SYSTOLIC BP: 180 MMHG
PROTOCOL NAME: NORMAL
REASON FOR TERMINATION: NORMAL
TARGET HR FORMULA: NORMAL
TIME IN EXERCISE PHASE: NORMAL

## 2021-02-12 PROCEDURE — 93016 CV STRESS TEST SUPVJ ONLY: CPT | Performed by: INTERNAL MEDICINE

## 2021-02-12 PROCEDURE — G1004 CDSM NDSC: HCPCS

## 2021-02-12 PROCEDURE — 78452 HT MUSCLE IMAGE SPECT MULT: CPT

## 2021-02-12 PROCEDURE — 93017 CV STRESS TEST TRACING ONLY: CPT

## 2021-02-12 PROCEDURE — A9502 TC99M TETROFOSMIN: HCPCS

## 2021-02-12 PROCEDURE — 78452 HT MUSCLE IMAGE SPECT MULT: CPT | Performed by: INTERNAL MEDICINE

## 2021-02-12 PROCEDURE — 93018 CV STRESS TEST I&R ONLY: CPT | Performed by: INTERNAL MEDICINE

## 2021-02-12 RX ADMIN — REGADENOSON 0.4 MG: 0.08 INJECTION, SOLUTION INTRAVENOUS at 09:50

## 2021-02-15 RX ORDER — RANOLAZINE 500 MG/1
TABLET, EXTENDED RELEASE ORAL
Qty: 60 TABLET | Refills: 8 | Status: SHIPPED | OUTPATIENT
Start: 2021-02-15 | End: 2021-07-22 | Stop reason: SDUPTHER

## 2021-02-16 ENCOUNTER — TELEPHONE (OUTPATIENT)
Dept: CARDIOLOGY CLINIC | Facility: CLINIC | Age: 66
End: 2021-02-16

## 2021-02-17 RX ORDER — OMEPRAZOLE 40 MG/1
CAPSULE, DELAYED RELEASE ORAL
Qty: 30 CAPSULE | Refills: 0 | Status: SHIPPED | OUTPATIENT
Start: 2021-02-17 | End: 2021-03-19

## 2021-02-17 RX ORDER — APIXABAN 5 MG/1
TABLET, FILM COATED ORAL
Qty: 60 TABLET | Refills: 0 | Status: SHIPPED | OUTPATIENT
Start: 2021-02-17 | End: 2021-03-19

## 2021-02-17 RX ORDER — LOSARTAN POTASSIUM 100 MG/1
TABLET ORAL
Qty: 30 TABLET | Refills: 0 | Status: SHIPPED | OUTPATIENT
Start: 2021-02-17 | End: 2021-03-19

## 2021-02-19 ENCOUNTER — TELEPHONE (OUTPATIENT)
Dept: FAMILY MEDICINE CLINIC | Facility: CLINIC | Age: 66
End: 2021-02-19

## 2021-02-19 NOTE — PRE-PROCEDURE INSTRUCTIONS
Pre-Surgery Instructions:   Medication Instructions    acetaminophen (TYLENOL) 500 mg tablet Instructed patient per Anesthesia Guidelines   aluminum-magnesium hydroxide 200-200 MG/5ML suspension Instructed patient per Anesthesia Guidelines   aspirin 81 MG tablet Instructed patient per Anesthesia Guidelines   atorvastatin (LIPITOR) 80 mg tablet Instructed patient per Anesthesia Guidelines   conjugated estrogens (PREMARIN) vaginal cream Instructed patient per Anesthesia Guidelines   Eliquis 5 MG Instructed patient per Anesthesia Guidelines   estradiol (ESTRACE) 0 1 mg/g vaginal cream Instructed patient per Anesthesia Guidelines   fluticasone (FLONASE) 50 mcg/act nasal spray Instructed patient per Anesthesia Guidelines   latanoprost (XALATAN) 0 005 % ophthalmic solution Instructed patient per Anesthesia Guidelines   loratadine (CLARITIN) 10 mg tablet Instructed patient per Anesthesia Guidelines   losartan (COZAAR) 100 MG tablet Instructed patient per Anesthesia Guidelines   meclizine (ANTIVERT) 12 5 MG tablet Instructed patient per Anesthesia Guidelines   metoprolol tartrate (LOPRESSOR) 50 mg tablet Instructed patient per Anesthesia Guidelines   metroNIDAZOLE (METROGEL) 1 % gel Instructed patient per Anesthesia Guidelines   omeprazole (PriLOSEC) 40 MG capsule Instructed patient per Anesthesia Guidelines   ranolazine (RANEXA) 500 mg 12 hr tablet Instructed patient per Anesthesia Guidelines  Instructions given to daughter, Ansley Andersen  Instructed patient to take Metoprolol and Omeprazole with sip of water the morning of surgery  Instructed to hold aspirin starting now per the cardiologist   Shweta Michaud at surgeon's office waiting to hear from PCP about the Eliquis and will inform patient

## 2021-02-19 NOTE — TELEPHONE ENCOUNTER
[facility] for [facility] is calling needing a letter ASAP, for pt to stop Eliquis 5 MG for 4 days she is having a B/L lid surgery on Thursday 02/25/20  Pt would need to stop medication Sunday latest Monday       UZ:[phone]

## 2021-02-22 ENCOUNTER — TELEPHONE (OUTPATIENT)
Dept: FAMILY MEDICINE CLINIC | Facility: CLINIC | Age: 66
End: 2021-02-22

## 2021-02-22 ENCOUNTER — CONSULT (OUTPATIENT)
Dept: FAMILY MEDICINE CLINIC | Facility: CLINIC | Age: 66
End: 2021-02-22

## 2021-02-22 VITALS
TEMPERATURE: 97.8 F | OXYGEN SATURATION: 98 % | RESPIRATION RATE: 16 BRPM | DIASTOLIC BLOOD PRESSURE: 72 MMHG | WEIGHT: 189.1 LBS | SYSTOLIC BLOOD PRESSURE: 128 MMHG | BODY MASS INDEX: 34.8 KG/M2 | HEART RATE: 62 BPM | HEIGHT: 62 IN

## 2021-02-22 DIAGNOSIS — I82.409 RECURRENT DEEP VENOUS THROMBOSIS (HCC): Primary | ICD-10-CM

## 2021-02-22 PROCEDURE — 3078F DIAST BP <80 MM HG: CPT | Performed by: FAMILY MEDICINE

## 2021-02-22 PROCEDURE — 1036F TOBACCO NON-USER: CPT | Performed by: FAMILY MEDICINE

## 2021-02-22 PROCEDURE — 1160F RVW MEDS BY RX/DR IN RCRD: CPT | Performed by: FAMILY MEDICINE

## 2021-02-22 PROCEDURE — 3074F SYST BP LT 130 MM HG: CPT | Performed by: FAMILY MEDICINE

## 2021-02-22 PROCEDURE — 99213 OFFICE O/P EST LOW 20 MIN: CPT | Performed by: FAMILY MEDICINE

## 2021-02-22 NOTE — H&P (VIEW-ONLY)
Presurgical Evaluation    Subjective:      Patient ID: Jimena Hurtado is a 78 y o  female  Chief Complaint   Patient presents with    Pre-op Clearance     b/l lid surgery       Jimena Hurtado is a 78 y o  female, presenting for pre op evaluation for scheduled Mullerectomy on 2/25  Surgical team required clearance to stop Eliquis prior to procedure  Pt was placed on this medication two years ago for recurrent DVT  Pt states that she is to be on it " for the rest of my life"> She does not have a family hx of DVT, or coagulopathy  She was not seen y Heme/Onc for this not nor did she have a previous workup  She has been tolerating this medication well and denies reoccurence of DVT post therapy  No hx of PE  The following portions of the patient's history were reviewed and updated as appropriate: current medications, past family history, past medical history and problem list     Procedure date: February 25, 2021    Surgeon: Dr Alexandra Molina  Planned procedure:  Mullerectomy  Diagnosis for procedure:  Bilateral Ptosis    Prior anesthesia: Yes   General; Complications:  None / Tolerated well    CAD History: CAD   NOTE: Patient should see Cardiology if time available before surgery, and if appropriate  Pulmonary History: None    Renal history: None    Diabetes History:  None     Neurological History: None     On Immunosuppressant meds/biologics: No      Review of Systems   Constitutional: Negative for chills, diaphoresis and fever  Respiratory: Negative for cough and shortness of breath  Cardiovascular: Negative for chest pain  Gastrointestinal: Negative for abdominal pain, diarrhea, nausea and vomiting  Genitourinary: Negative for dysuria  Musculoskeletal: Negative for arthralgias  Neurological: Negative for dizziness and headaches  Psychiatric/Behavioral: Negative for suicidal ideas           Current Outpatient Medications   Medication Sig Dispense Refill    acetaminophen (TYLENOL) 500 mg tablet Take 500 mg by mouth every 6 (six) hours as needed for mild pain      aluminum-magnesium hydroxide 200-200 MG/5ML suspension Take 5 mL by mouth every 6 (six) hours as needed for heartburn      atorvastatin (LIPITOR) 80 mg tablet take 1 tablet by mouth once daily 90 tablet 0    conjugated estrogens (PREMARIN) vaginal cream Apply a pea size amount of the estrogen cream to the opening of the vagina three nights per week   fluticasone (FLONASE) 50 mcg/act nasal spray 1 spray into each nostril daily 1 Bottle 1    latanoprost (XALATAN) 0 005 % ophthalmic solution Administer 1 drop to both eyes daily at bedtime       loratadine (CLARITIN) 10 mg tablet take 1 tablet by mouth once daily 90 tablet 1    losartan (COZAAR) 100 MG tablet take 1 tablet by mouth once daily 30 tablet 0    meclizine (ANTIVERT) 12 5 MG tablet Take 1 tablet (12 5 mg total) by mouth every 8 (eight) hours as needed for dizziness 20 tablet 0    metoprolol tartrate (LOPRESSOR) 50 mg tablet take 1 tablet by mouth every 12 hours 60 tablet 2    nitroglycerin (NITROSTAT) 0 4 mg SL tablet Place 1 tablet (0 4 mg total) under the tongue every 5 (five) minutes as needed for chest pain 90 tablet 0    omeprazole (PriLOSEC) 40 MG capsule take 1 capsule by mouth once daily 30 capsule 0    aspirin 81 MG tablet Take 1 tablet by mouth daily      Eliquis 5 MG take 1 tablet by mouth every 12 hours (Patient not taking: Reported on 2/22/2021) 60 tablet 0    estradiol (ESTRACE) 0 1 mg/g vaginal cream       metroNIDAZOLE (METROGEL) 1 % gel Apply topically daily (Patient not taking: Reported on 2/22/2021) 45 g 0    polymyxin b-trimethoprim (POLYTRIM) ophthalmic solution       prednisoLONE acetate (PRED FORTE) 1 % ophthalmic suspension       ranolazine (RANEXA) 500 mg 12 hr tablet take 1 tablet by mouth twice a day 60 tablet 8     No current facility-administered medications for this visit          Allergies on file: Ibuprofen    Patient Active Problem List   Diagnosis    Coronary artery disease    CAD S/P percutaneous coronary angioplasty    Hypertension, essential, benign    Atrophic vaginitis    Angina, class III (HCC)    Allergic rhinitis    Acid reflux disease    Dyslipidemia    Edema, lower extremity    Fatigue    Glaucoma, open angle    Hearing difficulty    Osteoarthritis of knee    Scoliosis    Uterovaginal prolapse, incomplete    Microscopic hematuria    Restless leg syndrome    Recurrent deep venous thrombosis (HCC)    Pain of both hip joints    Acute pain of left knee    Hypercholesterolemia    Nonintractable episodic headache    Need for 23-polyvalent pneumococcal polysaccharide vaccine    Dental caries    Left thyroid nodule    History of DVT (deep vein thrombosis)    Rosacea    Burning sensation of feet    Preop general physical exam    Benign essential hypertension    COVID-19 SCREENING FOR TRAVEL        Past Medical History:   Diagnosis Date    Angina at rest [facility])     Coronary artery disease     stents placed    Depression     resolved 6/29/16    Heart attack ([address] )     History of angioplasty     History of cardiac cath 2003    History of DVT (deep vein thrombosis) 5/18/2020    8/10/2017 RIGHT LOWER LIMB: There is acute deep vein thrombosis in several gastrocnemius veins from the proximal to the distal calf   HLD (hyperlipidemia)     Hyperlipidemia     Hypertension     Wears hearing aid in right ear        Past Surgical History:   Procedure Laterality Date    CARDIAC CATHETERIZATION      CARDIAC SURGERY  2000    open heart     CHOLECYSTECTOMY      CORONARY ANGIOPLASTY WITH STENT PLACEMENT      last assessed 6/29/16    CORONARY ANGIOPLASTY WITH STENT PLACEMENT      EAR SURGERY  1984    NM COLONOSCOPY FLX DX W/COLLJ SPEC WHEN PFRMD N/A 6/22/2017    Procedure: EGD AND COLONOSCOPY;  Surgeon: Simran Gutierrez MD;  [facility];   Service: Gastroenterology    US GUIDED THYROID BIOPSY  2020       Family History   Problem Relation Age of Onset    Diabetes Mother     Heart attack Mother         MI    Stroke Father         CVA   [de-identified] Hypothyroidism Sister     Heart disease Family         cardiovascular disease    Hypertension Family        Social History     Tobacco Use    Smoking status: Former Smoker     Types: Cigarettes     Quit date:      Years since quittin 1    Smokeless tobacco: Never Used   Substance Use Topics    Alcohol use: Never     Frequency: Never    Drug use: Never       Objective:    Vitals:    21 1503   BP: 128/72   BP Location: Left arm   Patient Position: Sitting   Cuff Size: Standard   Pulse: 62   Resp: 16   Temp: 97 8 °F (36 6 °C)   TempSrc: Temporal   SpO2: 98%   Weight: 85 8 kg (189 lb 1 6 oz)   Height: 5' 2" (1 575 m)        Physical Exam  Vitals signs reviewed  Constitutional:       General: She is not in acute distress  Appearance: She is well-developed  HENT:      Head:      Comments: Bilateral ptosis   Eyes:      General: No scleral icterus  Conjunctiva/sclera: Conjunctivae normal    Neck:      Musculoskeletal: Normal range of motion and neck supple  Cardiovascular:      Rate and Rhythm: Normal rate  Heart sounds: Normal heart sounds  No murmur  No friction rub  Pulmonary:      Effort: Pulmonary effort is normal  No respiratory distress  Breath sounds: Normal breath sounds  No wheezing  Musculoskeletal: Normal range of motion  Skin:     General: Skin is warm  Findings: No rash  Neurological:      Mental Status: She is alert and oriented to person, place, and time  Preop labs/testing available and reviewed: no               EKG no    Echo no    Stress test/cath yes    PFT/Art no    Functional capacity: Walking , 4-5 MPH               4 Mets   Pick the highest level patient can comfortably perform   4 mets or greater for surgery    RCRI  High Risk surgery?          1 Point  CAD History:         1 Point   MI; Positive Stress Test; CP due to Mi;  Nitrate Usage to control Angina; Pathologic Q wave on EKG  CHF Active:         1 Point   Pulm Edema; Paroxysmal Nocturnal Dyspnea;  Bibasilar Rales (crackles);S3; CHF on CXR  Cerebrovascular Disease (TIA or CVA):     1 Point  DM on Insulin:        1 Point  Serum Creat >2 0 mg/dl:       1 Point          Total Points: 1     Scorin: Class I, Very Low Risk (0 4%)     1: Class II, Low risk (0 9%)     2: Class III Moderate (6 6%)     3: Class IV High (>11%)      BJ Risk:  GFR:        For PCP:  If GFR>60, Hold ACE/ARB/Diuretic on the day of surgery, and NSAIDS 10 days before  If GFR<45, Consider PRE and POST op Nephrology Consult  If 46 <GFR> 59 : Has Patient had BJ in last 6 Months? no   If YES: Preop Nephrology consult   If No:  Karla 26 Nephrology consult  Assessment/Plan:    Patient is medically optimized (cleared) for the planned procedure  Further testing/evaluation is not required      Postop concerns: no    Problem List Items Addressed This Visit        Cardiovascular and Mediastinum    Recurrent deep venous thrombosis ([facility] [address] ) - Primary           Diagnoses and all orders for this visit:    Recurrent deep venous thrombosis (HCC)          Pre-Surgery Instructions:   Medication Instructions    acetaminophen (TYLENOL) 500 mg tablet Take morning of surgery    aluminum-magnesium hydroxide 200-200 MG/5ML suspension Take morning of surgery    atorvastatin (LIPITOR) 80 mg tablet Take morning of surgery    conjugated estrogens (PREMARIN) vaginal cream Take morning of surgery    fluticasone (FLONASE) 50 mcg/act nasal spray Take morning of surgery    latanoprost (XALATAN) 0 005 % ophthalmic solution Per Ophthalmology    loratadine (CLARITIN) 10 mg tablet Take morning of surgery    losartan (COZAAR) 100 MG tablet Take morning of surgery    meclizine (ANTIVERT) 12 5 MG tablet Stop taking 1 days prior to surgery    metoprolol tartrate (LOPRESSOR) 50 mg tablet Take morning of surgery    nitroglycerin (NITROSTAT) 0 4 mg SL tablet N/A    omeprazole (PriLOSEC) 40 MG capsule Take morning of surgery        NOTE: Please use the above to review important meds for your specialty, the remainder "per anesthesia Guidelines "    NOTE: Please place an Inbasket message for "[facility]""" pool for complicated patients  Letter provided clearing pt to stop Eliquis 2 days prior to procedure  This is a low risk procedure & pt will not be immobile in the post operative period  She can resume therapy 12-24 hrs after the procedure if there are no contraindications

## 2021-02-22 NOTE — LETTER
To Whom it May concern,    This is to inform that the patient, Catracho Ndiaye, :  1955,  Is scheduled for a mullerectomy   She is medically cleared  to stop her medication eliquis 2 days prior to her procedure  She can resume this medication 12-24 hours after the procedure as long as are no contraindications to this  Please do not hesitate to contact me if you have any questions      Sincerely,     Dr Channing De La Fuente

## 2021-02-22 NOTE — PROGRESS NOTES
Presurgical Evaluation    Subjective:      Patient ID: Teo Hurd is a 78 y o  female  Chief Complaint   Patient presents with    Pre-op Clearance     b/l lid surgery       Teo Hurd is a 78 y o  female, presenting for pre op evaluation for scheduled Mullerectomy on 2/25  Surgical team required clearance to stop Eliquis prior to procedure  Pt was placed on this medication two years ago for recurrent DVT  Pt states that she is to be on it " for the rest of my life"> She does not have a family hx of DVT, or coagulopathy  She was not seen y Heme/Onc for this not nor did she have a previous workup  She has been tolerating this medication well and denies reoccurence of DVT post therapy  No hx of PE  The following portions of the patient's history were reviewed and updated as appropriate: current medications, past family history, past medical history and problem list     Procedure date: February 25, 2021    Surgeon: Dr Gillermo Canavan  Planned procedure:  Mullerectomy  Diagnosis for procedure:  Bilateral Ptosis    Prior anesthesia: Yes   General; Complications:  None / Tolerated well    CAD History: CAD   NOTE: Patient should see Cardiology if time available before surgery, and if appropriate  Pulmonary History: None    Renal history: None    Diabetes History:  None     Neurological History: None     On Immunosuppressant meds/biologics: No      Review of Systems   Constitutional: Negative for chills, diaphoresis and fever  Respiratory: Negative for cough and shortness of breath  Cardiovascular: Negative for chest pain  Gastrointestinal: Negative for abdominal pain, diarrhea, nausea and vomiting  Genitourinary: Negative for dysuria  Musculoskeletal: Negative for arthralgias  Neurological: Negative for dizziness and headaches  Psychiatric/Behavioral: Negative for suicidal ideas           Current Outpatient Medications   Medication Sig Dispense Refill    acetaminophen (TYLENOL) 500 mg tablet Take 500 mg by mouth every 6 (six) hours as needed for mild pain      aluminum-magnesium hydroxide 200-200 MG/5ML suspension Take 5 mL by mouth every 6 (six) hours as needed for heartburn      atorvastatin (LIPITOR) 80 mg tablet take 1 tablet by mouth once daily 90 tablet 0    conjugated estrogens (PREMARIN) vaginal cream Apply a pea size amount of the estrogen cream to the opening of the vagina three nights per week   fluticasone (FLONASE) 50 mcg/act nasal spray 1 spray into each nostril daily 1 Bottle 1    latanoprost (XALATAN) 0 005 % ophthalmic solution Administer 1 drop to both eyes daily at bedtime       loratadine (CLARITIN) 10 mg tablet take 1 tablet by mouth once daily 90 tablet 1    losartan (COZAAR) 100 MG tablet take 1 tablet by mouth once daily 30 tablet 0    meclizine (ANTIVERT) 12 5 MG tablet Take 1 tablet (12 5 mg total) by mouth every 8 (eight) hours as needed for dizziness 20 tablet 0    metoprolol tartrate (LOPRESSOR) 50 mg tablet take 1 tablet by mouth every 12 hours 60 tablet 2    nitroglycerin (NITROSTAT) 0 4 mg SL tablet Place 1 tablet (0 4 mg total) under the tongue every 5 (five) minutes as needed for chest pain 90 tablet 0    omeprazole (PriLOSEC) 40 MG capsule take 1 capsule by mouth once daily 30 capsule 0    aspirin 81 MG tablet Take 1 tablet by mouth daily      Eliquis 5 MG take 1 tablet by mouth every 12 hours (Patient not taking: Reported on 2/22/2021) 60 tablet 0    estradiol (ESTRACE) 0 1 mg/g vaginal cream       metroNIDAZOLE (METROGEL) 1 % gel Apply topically daily (Patient not taking: Reported on 2/22/2021) 45 g 0    polymyxin b-trimethoprim (POLYTRIM) ophthalmic solution       prednisoLONE acetate (PRED FORTE) 1 % ophthalmic suspension       ranolazine (RANEXA) 500 mg 12 hr tablet take 1 tablet by mouth twice a day 60 tablet 8     No current facility-administered medications for this visit          Allergies on file: Ibuprofen    Patient Active Problem List   Diagnosis    Coronary artery disease    CAD S/P percutaneous coronary angioplasty    Hypertension, essential, benign    Atrophic vaginitis    Angina, class III (HCC)    Allergic rhinitis    Acid reflux disease    Dyslipidemia    Edema, lower extremity    Fatigue    Glaucoma, open angle    Hearing difficulty    Osteoarthritis of knee    Scoliosis    Uterovaginal prolapse, incomplete    Microscopic hematuria    Restless leg syndrome    Recurrent deep venous thrombosis (HCC)    Pain of both hip joints    Acute pain of left knee    Hypercholesterolemia    Nonintractable episodic headache    Need for 23-polyvalent pneumococcal polysaccharide vaccine    Dental caries    Left thyroid nodule    History of DVT (deep vein thrombosis)    Rosacea    Burning sensation of feet    Preop general physical exam    Benign essential hypertension    COVID-19 SCREENING FOR TRAVEL        Past Medical History:   Diagnosis Date    Angina at rest [facility])     Coronary artery disease     stents placed    Depression     resolved 6/29/16    Heart attack ([address] )     History of angioplasty     History of cardiac cath 2003    History of DVT (deep vein thrombosis) 5/18/2020    8/10/2017 RIGHT LOWER LIMB: There is acute deep vein thrombosis in several gastrocnemius veins from the proximal to the distal calf   HLD (hyperlipidemia)     Hyperlipidemia     Hypertension     Wears hearing aid in right ear        Past Surgical History:   Procedure Laterality Date    CARDIAC CATHETERIZATION      CARDIAC SURGERY  2000    open heart     CHOLECYSTECTOMY      CORONARY ANGIOPLASTY WITH STENT PLACEMENT      last assessed 6/29/16    CORONARY ANGIOPLASTY WITH STENT PLACEMENT      EAR SURGERY  1984    SC COLONOSCOPY FLX DX W/COLLJ SPEC WHEN PFRMD N/A 6/22/2017    Procedure: EGD AND COLONOSCOPY;  Surgeon: Alexi Hartmann MD;  Location: [facility];   Service: Gastroenterology    US GUIDED THYROID BIOPSY  2020       Family History   Problem Relation Age of Onset    Diabetes Mother     Heart attack Mother         MI    Stroke Father         CVA   Valaria Reil Hypothyroidism Sister     Heart disease Family         cardiovascular disease    Hypertension Family        Social History     Tobacco Use    Smoking status: Former Smoker     Types: Cigarettes     Quit date:      Years since quittin 1    Smokeless tobacco: Never Used   Substance Use Topics    Alcohol use: Never     Frequency: Never    Drug use: Never       Objective:    Vitals:    21 1503   BP: 128/72   BP Location: Left arm   Patient Position: Sitting   Cuff Size: Standard   Pulse: 62   Resp: 16   Temp: 97 8 °F (36 6 °C)   TempSrc: Temporal   SpO2: 98%   Weight: 85 8 kg (189 lb 1 6 oz)   Height: 5' 2" (1 575 m)        Physical Exam  Vitals signs reviewed  Constitutional:       General: She is not in acute distress  Appearance: She is well-developed  HENT:      Head:      Comments: Bilateral ptosis   Eyes:      General: No scleral icterus  Conjunctiva/sclera: Conjunctivae normal    Neck:      Musculoskeletal: Normal range of motion and neck supple  Cardiovascular:      Rate and Rhythm: Normal rate  Heart sounds: Normal heart sounds  No murmur  No friction rub  Pulmonary:      Effort: Pulmonary effort is normal  No respiratory distress  Breath sounds: Normal breath sounds  No wheezing  Musculoskeletal: Normal range of motion  Skin:     General: Skin is warm  Findings: No rash  Neurological:      Mental Status: She is alert and oriented to person, place, and time  Preop labs/testing available and reviewed: no               EKG no    Echo no    Stress test/cath yes    PFT/Art no    Functional capacity: Walking , 4-5 MPH               4 Mets   Pick the highest level patient can comfortably perform   4 mets or greater for surgery    RCRI  High Risk surgery?          1 Point  CAD History:         1 Point   MI; Positive Stress Test; CP due to Mi;  Nitrate Usage to control Angina; Pathologic Q wave on EKG  CHF Active:         1 Point   Pulm Edema; Paroxysmal Nocturnal Dyspnea;  Bibasilar Rales (crackles);S3; CHF on CXR  Cerebrovascular Disease (TIA or CVA):     1 Point  DM on Insulin:        1 Point  Serum Creat >2 0 mg/dl:       1 Point          Total Points: 1     Scorin: Class I, Very Low Risk (0 4%)     1: Class II, Low risk (0 9%)     2: Class III Moderate (6 6%)     3: Class IV High (>11%)      BJ Risk:  GFR:        For PCP:  If GFR>60, Hold ACE/ARB/Diuretic on the day of surgery, and NSAIDS 10 days before  If GFR<45, Consider PRE and POST op Nephrology Consult  If 46 <GFR> 59 : Has Patient had BJ in last 6 Months? no   If YES: Preop Nephrology consult   If No:  Karla [address] Nephrology consult  Assessment/Plan:    Patient is medically optimized (cleared) for the planned procedure  Further testing/evaluation is not required      Postop concerns: no    Problem List Items Addressed This Visit        Cardiovascular and Mediastinum    Recurrent deep venous thrombosis ([facility] [address] ) - Primary           Diagnoses and all orders for this visit:    Recurrent deep venous thrombosis (HCC)          Pre-Surgery Instructions:   Medication Instructions    acetaminophen (TYLENOL) 500 mg tablet Take morning of surgery    aluminum-magnesium hydroxide 200-200 MG/5ML suspension Take morning of surgery    atorvastatin (LIPITOR) 80 mg tablet Take morning of surgery    conjugated estrogens (PREMARIN) vaginal cream Take morning of surgery    fluticasone (FLONASE) 50 mcg/act nasal spray Take morning of surgery    latanoprost (XALATAN) 0 005 % ophthalmic solution Per Ophthalmology    loratadine (CLARITIN) 10 mg tablet Take morning of surgery    losartan (COZAAR) 100 MG tablet Take morning of surgery    meclizine (ANTIVERT) 12 5 MG tablet Stop taking 1 days prior to surgery    metoprolol tartrate (LOPRESSOR) 50 mg tablet Take morning of surgery    nitroglycerin (NITROSTAT) 0 4 mg SL tablet N/A    omeprazole (PriLOSEC) 40 MG capsule Take morning of surgery        NOTE: Please use the above to review important meds for your specialty, the remainder "per anesthesia Guidelines "    NOTE: Please place an Inbasket message for "[facility]'S \[facility] [place] [facility]\""" pool for complicated patients  Letter provided clearing pt to stop Eliquis 2 days prior to procedure  This is a low risk procedure & pt will not be immobile in the post operative period  She can resume therapy 12-24 hrs after the procedure if there are no contraindications

## 2021-02-22 NOTE — TELEPHONE ENCOUNTER
SIGNATURES NEEDED FOR [facility] pre op  FORM RECEIVED VIA FAX  WILL BE PLACED IN YOUR BIN AT ASSIGNED DELIVERY TIMES

## 2021-02-23 ENCOUNTER — ANESTHESIA EVENT (OUTPATIENT)
Dept: PERIOP | Facility: HOSPITAL | Age: 66
End: 2021-02-23
Payer: COMMERCIAL

## 2021-02-23 PROBLEM — H53.9 VISUAL DISORDER: Status: ACTIVE | Noted: 2021-02-23

## 2021-02-23 PROBLEM — Z98.61 HISTORY OF PTCA: Status: ACTIVE | Noted: 2021-02-23

## 2021-02-23 NOTE — ANESTHESIA PREPROCEDURE EVALUATION
Procedure:  UPPER LID MULLERECTOMY (Bilateral Face)    Relevant Problems   ANESTHESIA  old chart reviewed      CARDIO  lopressor in AM   stress test result noted   (+) Angina, class III (HCC)   (+) Benign essential hypertension   (+) CAD S/P percutaneous coronary angioplasty   (+) Coronary artery disease   (+) History of PTCA   (+) Hypercholesterolemia   (+) Hypertension, essential, benign   (+) Recurrent deep venous thrombosis (HCC)      ENDO  obesity      GI/HEPATIC   (+) Acid reflux disease      /RENAL (within normal limits)      GYN  PM      HEMATOLOGY (within normal limits)      MUSCULOSKELETAL   (+) Osteoarthritis of knee   (+) Scoliosis      NEURO/PSYCH  glaucoma hx   (+) History of DVT (deep vein thrombosis)   (+) Nonintractable episodic headache   (+) Visual disorder      PULMONARY  ex smoker  likely MARIJA   (-) Smoking   (-) URI (upper respiratory infection)        Physical Exam    Airway    Mallampati score: II  TM Distance: >3 FB  Neck ROM: limited     Dental       Cardiovascular  Cardiovascular exam normal    Pulmonary  Pulmonary exam normal     Other Findings        Anesthesia Plan  ASA Score- 3     Anesthesia Type- IV sedation with anesthesia and general with ASA Monitors  Additional Monitors:   Airway Plan: LMA  Comment: [place] only   Daughter is an excellent   Likely MARIJA  Plan Factors-Exercise tolerance (METS): >4 METS  Chart reviewed  EKG reviewed  Imaging results reviewed  Existing labs reviewed  Patient summary reviewed  Patient is not a current smoker  Patient not instructed to abstain from smoking on day of procedure  Patient did not smoke on day of surgery  Obstructive sleep apnea risk education given perioperatively  Induction- intravenous  Postoperative Plan- Plan for postoperative opioid use  Informed Consent- Anesthetic plan and risks discussed with patient  I personally reviewed this patient with the CRNA   Discussed and agreed on the Anesthesia Plan with the EFRAÍN High

## 2021-02-24 ENCOUNTER — TELEPHONE (OUTPATIENT)
Dept: FAMILY MEDICINE CLINIC | Facility: CLINIC | Age: 66
End: 2021-02-24

## 2021-02-24 NOTE — TELEPHONE ENCOUNTER
----- Message from Merly Case MD sent at 2/23/2021 10:07 AM EST -----  Hello! Just wanted to confirm if the letter that was created under communications was sent to [facility] for [facility]  Thank you!

## 2021-02-25 ENCOUNTER — HOSPITAL ENCOUNTER (OUTPATIENT)
Facility: HOSPITAL | Age: 66
Setting detail: OUTPATIENT SURGERY
Discharge: HOME | End: 2021-02-25
Attending: OPHTHALMOLOGY | Admitting: OPHTHALMOLOGY
Payer: COMMERCIAL

## 2021-02-25 ENCOUNTER — ANESTHESIA (OUTPATIENT)
Dept: PERIOP | Facility: HOSPITAL | Age: 66
End: 2021-02-25
Payer: COMMERCIAL

## 2021-02-25 VITALS
DIASTOLIC BLOOD PRESSURE: 80 MMHG | HEART RATE: 64 BPM | WEIGHT: 189 LBS | BODY MASS INDEX: 34.78 KG/M2 | SYSTOLIC BLOOD PRESSURE: 160 MMHG | RESPIRATION RATE: 18 BRPM | HEIGHT: 62 IN | OXYGEN SATURATION: 97 % | TEMPERATURE: 97 F

## 2021-02-25 DIAGNOSIS — H02.403 ACQUIRED INVOLUTIONAL PTOSIS OF EYELID, BILATERAL: Primary | ICD-10-CM

## 2021-02-25 RX ORDER — BUPIVACAINE HYDROCHLORIDE 5 MG/ML
INJECTION, SOLUTION PERINEURAL AS NEEDED
Status: DISCONTINUED | OUTPATIENT
Start: 2021-02-25 | End: 2021-02-25 | Stop reason: HOSPADM

## 2021-02-25 RX ORDER — DEXAMETHASONE SODIUM PHOSPHATE 4 MG/ML
4 INJECTION, SOLUTION INTRA-ARTICULAR; INTRALESIONAL; INTRAMUSCULAR; INTRAVENOUS; SOFT TISSUE ONCE AS NEEDED
Status: DISCONTINUED | OUTPATIENT
Start: 2021-02-25 | End: 2021-02-25 | Stop reason: HOSPADM

## 2021-02-25 RX ORDER — MEPERIDINE HYDROCHLORIDE 25 MG/ML
12.5 INJECTION INTRAMUSCULAR; INTRAVENOUS; SUBCUTANEOUS
Status: DISCONTINUED | OUTPATIENT
Start: 2021-02-25 | End: 2021-02-25 | Stop reason: HOSPADM

## 2021-02-25 RX ORDER — FENTANYL CITRATE 50 UG/ML
INJECTION, SOLUTION INTRAMUSCULAR; INTRAVENOUS AS NEEDED
Status: DISCONTINUED | OUTPATIENT
Start: 2021-02-25 | End: 2021-02-25

## 2021-02-25 RX ORDER — FENTANYL CITRATE/PF 50 MCG/ML
12.5 SYRINGE (ML) INJECTION
Status: DISCONTINUED | OUTPATIENT
Start: 2021-02-25 | End: 2021-02-25 | Stop reason: HOSPADM

## 2021-02-25 RX ORDER — DEXAMETHASONE SODIUM PHOSPHATE 10 MG/ML
INJECTION, SOLUTION INTRAMUSCULAR; INTRAVENOUS AS NEEDED
Status: DISCONTINUED | OUTPATIENT
Start: 2021-02-25 | End: 2021-02-25

## 2021-02-25 RX ORDER — HYDROCODONE BITARTRATE AND ACETAMINOPHEN 5; 325 MG/1; MG/1
1 TABLET ORAL EVERY 6 HOURS PRN
Qty: 20 TABLET | Refills: 0 | Status: SHIPPED | OUTPATIENT
Start: 2021-02-25 | End: 2021-03-07

## 2021-02-25 RX ORDER — LIDOCAINE HYDROCHLORIDE 10 MG/ML
INJECTION, SOLUTION EPIDURAL; INFILTRATION; INTRACAUDAL; PERINEURAL AS NEEDED
Status: DISCONTINUED | OUTPATIENT
Start: 2021-02-25 | End: 2021-02-25

## 2021-02-25 RX ORDER — DIPHENHYDRAMINE HYDROCHLORIDE 50 MG/ML
12.5 INJECTION INTRAMUSCULAR; INTRAVENOUS ONCE AS NEEDED
Status: DISCONTINUED | OUTPATIENT
Start: 2021-02-25 | End: 2021-02-25 | Stop reason: HOSPADM

## 2021-02-25 RX ORDER — PROMETHAZINE HYDROCHLORIDE 25 MG/ML
12.5 INJECTION, SOLUTION INTRAMUSCULAR; INTRAVENOUS ONCE AS NEEDED
Status: DISCONTINUED | OUTPATIENT
Start: 2021-02-25 | End: 2021-02-25 | Stop reason: HOSPADM

## 2021-02-25 RX ORDER — LIDOCAINE HYDROCHLORIDE AND EPINEPHRINE BITARTRATE 20; .01 MG/ML; MG/ML
INJECTION, SOLUTION SUBCUTANEOUS AS NEEDED
Status: DISCONTINUED | OUTPATIENT
Start: 2021-02-25 | End: 2021-02-25 | Stop reason: HOSPADM

## 2021-02-25 RX ORDER — SODIUM CHLORIDE 9 MG/ML
125 INJECTION, SOLUTION INTRAVENOUS CONTINUOUS
Status: DISCONTINUED | OUTPATIENT
Start: 2021-02-25 | End: 2021-02-25 | Stop reason: HOSPADM

## 2021-02-25 RX ORDER — FENTANYL CITRATE/PF 50 MCG/ML
25 SYRINGE (ML) INJECTION
Status: DISCONTINUED | OUTPATIENT
Start: 2021-02-25 | End: 2021-02-25 | Stop reason: HOSPADM

## 2021-02-25 RX ORDER — BALANCED SALT SOLUTION 6.4; .75; .48; .3; 3.9; 1.7 MG/ML; MG/ML; MG/ML; MG/ML; MG/ML; MG/ML
SOLUTION OPHTHALMIC AS NEEDED
Status: DISCONTINUED | OUTPATIENT
Start: 2021-02-25 | End: 2021-02-25 | Stop reason: HOSPADM

## 2021-02-25 RX ORDER — ONDANSETRON 2 MG/ML
INJECTION INTRAMUSCULAR; INTRAVENOUS AS NEEDED
Status: DISCONTINUED | OUTPATIENT
Start: 2021-02-25 | End: 2021-02-25

## 2021-02-25 RX ORDER — PROPOFOL 10 MG/ML
INJECTION, EMULSION INTRAVENOUS AS NEEDED
Status: DISCONTINUED | OUTPATIENT
Start: 2021-02-25 | End: 2021-02-25

## 2021-02-25 RX ORDER — MAGNESIUM HYDROXIDE 1200 MG/15ML
LIQUID ORAL AS NEEDED
Status: DISCONTINUED | OUTPATIENT
Start: 2021-02-25 | End: 2021-02-25 | Stop reason: HOSPADM

## 2021-02-25 RX ADMIN — FENTANYL CITRATE 25 MCG: 50 INJECTION INTRAMUSCULAR; INTRAVENOUS at 13:04

## 2021-02-25 RX ADMIN — FENTANYL CITRATE 25 MCG: 50 INJECTION INTRAMUSCULAR; INTRAVENOUS at 13:10

## 2021-02-25 RX ADMIN — DEXAMETHASONE SODIUM PHOSPHATE 4 MG: 10 INJECTION, SOLUTION INTRAMUSCULAR; INTRAVENOUS at 13:16

## 2021-02-25 RX ADMIN — LIDOCAINE HYDROCHLORIDE 50 MG: 10 INJECTION, SOLUTION EPIDURAL; INFILTRATION; INTRACAUDAL; PERINEURAL at 13:04

## 2021-02-25 RX ADMIN — SODIUM CHLORIDE: 0.9 INJECTION, SOLUTION INTRAVENOUS at 13:01

## 2021-02-25 RX ADMIN — FENTANYL CITRATE 25 MCG: 50 INJECTION INTRAMUSCULAR; INTRAVENOUS at 13:20

## 2021-02-25 RX ADMIN — FENTANYL CITRATE 25 MCG: 50 INJECTION INTRAMUSCULAR; INTRAVENOUS at 13:15

## 2021-02-25 RX ADMIN — ONDANSETRON HYDROCHLORIDE 4 MG: 2 INJECTION, SOLUTION INTRAMUSCULAR; INTRAVENOUS at 13:44

## 2021-02-25 RX ADMIN — PROPOFOL 200 MG: 10 INJECTION, EMULSION INTRAVENOUS at 13:04

## 2021-02-25 NOTE — NURSING NOTE
Pt is awake,alert,tolerated diet, OOB with assistance, voided  Pt and family seen and instructed by Dr Tati Humphries  All questions were answered, no complaints offered

## 2021-02-25 NOTE — ANESTHESIA POSTPROCEDURE EVALUATION
Post-Op Assessment Note    CV Status:  Stable  Pain Score: 2    Pain management: adequate     Mental Status:  Alert and awake   Hydration Status:  Euvolemic   PONV Controlled:  Controlled   Airway Patency:  Patent      Post Op Vitals Reviewed: Yes      Staff: Anesthesiologist, CRNA   Comments: LMA without any known complications        No complications documented      /76 (02/25/21 1415)    Temp      Pulse 69 (02/25/21 1415)   Resp 14 (02/25/21 1415)    SpO2 95 % (02/25/21 1415)

## 2021-02-25 NOTE — ANESTHESIA POSTPROCEDURE EVALUATION
Post-Op Assessment Note    CV Status:  Stable    Pain management: adequate     Mental Status:  Alert and awake   Hydration Status:  Euvolemic   PONV Controlled:  Controlled   Airway Patency:  Patent      Post Op Vitals Reviewed: Yes      Staff: CRNA         No complications documented      BP (P) 136/66 (02/25/21 1355)    Temp (P) 98 °F (36 7 °C) (02/25/21 1355)    Pulse (P) 68 (02/25/21 1355)   Resp (P) 16 (02/25/21 1355)    SpO2 (P) 93 % (02/25/21 1355)

## 2021-02-25 NOTE — DISCHARGE INSTRUCTIONS
POST-OPERATIVE INSTRUCTIONS   EYELID SURGERY     Diet: While post-operative nausea is rare with this type of anesthesia, it is wise to start your diet by drinking clear liquids after surgery (e g , 7-Up, Gatorade, ginger ale, etc )  If clear liquids are tolerated well without nausea or vomiting, you may advance towards a regular diet  Avoid fatty foods (e g , milk, pizza, hamburgers, etc ) on the day of surgery or as long as nausea persists  Pain control: Many eyelid procedures only require Extra Strength Tylenol (acetaminophen) for postoperative pain control  For those patients with more extensive eyelid reconstruction, a prescription for a pain reliever is often given  Patients may choose to take the prescribed pain reliever OR Extra Strength Tylenol, BUT NOT both together  Unless specifically instructed, aspirin products such as Rusty, Bufferin, Anacin, Excedrin, Advil, Nuprin, Motrin, Ibuprofen, etc , should be avoided for at least one week after surgery  Any other medications which you were taking prior to surgery, should be continued on their regular schedule, unless you were instructed otherwise  Activity: Keep your activity to a minimum for the first 24 to 48 hours after surgery  No heavy lifting, bending, or straining until after you have your post-op visit  You may shower 24 hours after surgery  Wash your hair in the shower with your back to the water  Wash your face gently with a washcloth  Try not to get the stitches wet until you are seen for your post-op appointment  Whenever lying down or sleeping, keep your head elevated on 2 or 3 pillows such that your head is always elevated above the level of your chest      Wound care: Apply a cold compress to the surgical site as much as possible for 48 hours following surgery (20 minutes on/20 minutes off while awake)  A folded washcloth soaked in ice-cold water and wrung out or a bag of frozen peas works well for this   Do not apply ice directly to the skin  Once the ice packs are discontinued, you can start using warm, moist compresses over the affected area for 20 minutes at a time, four times a day  This helps with swelling and can be continued until you first post-operative visit  Avoid placing a patch on the eye if possible  A patch is usually not necessary and may delay recognition of potential complications  A small tube of eye ointment is prescribed after surgery  This is to be gently applied to the stitches twice  A Q-tip works well for this  If the eyes feel irritated, the ointment or artificial tears (which can be bought over-the-counter at the pharmacy) are safe to use in the eye for lubrication as often as needed  This will likely result in blurred vision but will go away once the ointment is stopped  EXCEPTION: If a patch is placed over the eye by the surgeon, do NOT apply cold compresses or ointment  Leave the patch undisturbed  A physician will remove the patch at your first post-operative visit  Follow up: If your surgery was done on an outpatient basis, you should receive a phone call the day after surgery to check on your progress and to arrange for a post-operative clinic appointment  The first post-operative visit will be one to two weeks after surgery to check the incisions and remove sutures if necessary  A second post-operative visit six to eight weeks after surgery will assess the final surgical result  The following problems should be reported to the office as soon as possible:   1  Continuous, brisk bleeding  Please note that some oozing or drainage is common following a surgical procedure  Do not try to clean dried blood from the surgical site  This will likely only create more bleeding  2  Temperature (fever) over 101?     3  Excessive pain at surgical site not relieved by the pain medication, especially if associated with protrusion of the eyeball  4  Sudden loss of vision   Please note that some blurriness is expected after surgery due to the ointment used around the eyes  All patients should be able to check their vision even with eyelid swelling  5  Double vision     If a problem should arise or you have a question, please call:     · Office [phone]    I hope that your surgery experience with us is a positive one  Please contact my office with any questions       David Villasenor MD, MPhil

## 2021-02-25 NOTE — OP NOTE
OPERATIVE REPORT  PATIENT NAME: Frandy Villasenor    :  1955  MRN: [id number]  Pt Location:  OR ROOM 10    SURGERY DATE: 2021    Surgeon(s) and Role:     Rosas Rivas MD - Primary    Preop Diagnosis:  Unspecified ptosis of bilateral eyelids [H02 403]    Post-Op Diagnosis Codes:     * Unspecified ptosis of bilateral eyelids [H02 403]    Procedure(s) (LRB):  UPPER LID MULLERECTOMY (Bilateral)    Specimen(s):  * No specimens in log *    Estimated Blood Loss:   Minimal    Drains:  * No LDAs found *    Anesthesia Type:   IV Sedation with Anesthesia    Operative Indications:  Unspecified ptosis of bilateral eyelids [H02 403]       Operative Findings:  Bilateral upper lid ptosis    Complications:   None    Procedure and Technique: This is a patient who presented to clinic with visually significant bilateral upper lid ptosis the patient noted that the heavy upper eyelid was interfering with her peripheral vision and ability to navigate outside  The risks benefits and alternatives of surgery were discussed at length with the patient  All questions were answered  Patient signed informed consent  The patient was taken to the operating room and placed on the table in the supine position where anesthesia was induced  2% lidocaine with epinephrine was injected at the surgical site and the patient was prepped and draped in the usual manner for ocular facial surgery  A 4 0 silk traction suture was placed through the right upper lid  The lid was everted over a Desmarres retractor  Further local anesthetic was injected subconjunctivally  4 mm was marked from the superior border of tarsus onto the conjunctiva  A 4 0 silk traction suture was placed through the conjunctiva and quick's muscle at the marked points  The conjunctiva and Quick's muscle was tented up and placed in a Putterman ptosis clamp    A 5 0 fast-absorbing plain gut suture was passed full-thickness through the upper lid from the skin to conjunctiva at the lateral aspect and passed in a running horizontal mattress fashion from lateral to medial and then medial to lateral where it was then exited from the conjunctiva to the skin and tied off  The tissue in the ptosis clamp was then excised with a 15 Bard-Pedro blade  The traction sutures were removed  The same procedure was then performed on the left upper lid  The patient was then awakened and taken from the operating room in good condition having tolerated the procedure well       I was present for the entire procedure    Patient Disposition:  PACU     SIGNATURE: Jil Greene MD  DATE: February 25, 2021  TIME: 12:46 PM

## 2021-03-10 DIAGNOSIS — Z23 ENCOUNTER FOR IMMUNIZATION: ICD-10-CM

## 2021-03-16 ENCOUNTER — OFFICE VISIT (OUTPATIENT)
Dept: CARDIOLOGY CLINIC | Facility: CLINIC | Age: 66
End: 2021-03-16
Payer: COMMERCIAL

## 2021-03-16 VITALS
BODY MASS INDEX: 32.44 KG/M2 | HEIGHT: 64 IN | HEART RATE: 66 BPM | SYSTOLIC BLOOD PRESSURE: 140 MMHG | DIASTOLIC BLOOD PRESSURE: 80 MMHG | WEIGHT: 190 LBS

## 2021-03-16 DIAGNOSIS — Z98.61 CAD S/P PERCUTANEOUS CORONARY ANGIOPLASTY: ICD-10-CM

## 2021-03-16 DIAGNOSIS — I25.10 CAD S/P PERCUTANEOUS CORONARY ANGIOPLASTY: ICD-10-CM

## 2021-03-16 DIAGNOSIS — E78.5 DYSLIPIDEMIA: ICD-10-CM

## 2021-03-16 DIAGNOSIS — I10 HYPERTENSION, ESSENTIAL, BENIGN: Primary | ICD-10-CM

## 2021-03-16 DIAGNOSIS — I25.118 CORONARY ARTERY DISEASE OF NATIVE ARTERY OF NATIVE HEART WITH STABLE ANGINA PECTORIS (HCC): ICD-10-CM

## 2021-03-16 PROCEDURE — 1160F RVW MEDS BY RX/DR IN RCRD: CPT | Performed by: INTERNAL MEDICINE

## 2021-03-16 PROCEDURE — 99214 OFFICE O/P EST MOD 30 MIN: CPT | Performed by: INTERNAL MEDICINE

## 2021-03-16 PROCEDURE — 1036F TOBACCO NON-USER: CPT | Performed by: INTERNAL MEDICINE

## 2021-03-16 PROCEDURE — 3008F BODY MASS INDEX DOCD: CPT | Performed by: INTERNAL MEDICINE

## 2021-03-16 PROCEDURE — 3008F BODY MASS INDEX DOCD: CPT | Performed by: FAMILY MEDICINE

## 2021-03-16 NOTE — PROGRESS NOTES
Cardiology Follow Up    Reggie Cowden  1955  [id number]  [address] [facility] [place]  [facility]  [id number] [facility]  [phone]    1  Hypertension, essential, benign     2  CAD S/P percutaneous coronary angioplasty     3  Coronary artery disease of native artery of native heart with stable angina pectoris ([address] )     4  Dyslipidemia         Interval History:   Cardiology follow-up  Patient continues to do well, she walks regularly almost an hour every day, brisk pace, she has really has had no much in the way of angina, last nitroglycerin was almost a year ago  She is compliant low-cholesterol diet, most recent lipid profile total cholesterol 173, HDL 56, LDL 91, adequate control on high-intensity statin therapy  No significant bleeding issues on full anticoagulation factor Xa inhibitor plus aspirin  Compliant low-sodium diet, blood pressures been well control as well    She complains of poor sleep    Patient Active Problem List   Diagnosis    Coronary artery disease    CAD S/P percutaneous coronary angioplasty    Hypertension, essential, benign    Atrophic vaginitis    Angina, class III (HCC)    Allergic rhinitis    Acid reflux disease    Dyslipidemia    Edema, lower extremity    Fatigue    Glaucoma, open angle    Hearing difficulty    Osteoarthritis of knee    Scoliosis    Uterovaginal prolapse, incomplete    Microscopic hematuria    Restless leg syndrome    Recurrent deep venous thrombosis (HCC)    Pain of both hip joints    Acute pain of left knee    Hypercholesterolemia    Nonintractable episodic headache    Need for 23-polyvalent pneumococcal polysaccharide vaccine    Dental caries    Left thyroid nodule    History of DVT (deep vein thrombosis)    Rosacea    Burning sensation of feet    Preop general physical exam    Benign essential hypertension    COVID-19 SCREENING FOR TRAVEL  History of PTCA    Visual disorder     Past Medical History:   Diagnosis Date    Angina at rest [facility])     Coronary artery disease     stents placed    Depression     resolved 16    Heart attack ([facility] [address] [facility] )     History of angioplasty     History of cardiac cath     History of DVT (deep vein thrombosis) 2020    8/10/2017 RIGHT LOWER LIMB: There is acute deep vein thrombosis in several gastrocnemius veins from the proximal to the distal calf   HLD (hyperlipidemia)     Hyperlipidemia     Hypertension     Wears hearing aid in right ear      Social History     Socioeconomic History    Marital status:       Spouse name: Not on file    Number of children: Not on file    Years of education: Not on file    Highest education level: Not on file   Occupational History    Not on file   Social Needs    Financial resource strain: Not on file    Food insecurity     Worry: Not on file     Inability: Not on file    Transportation needs     Medical: Not on file     Non-medical: Not on file   Tobacco Use    Smoking status: Former Smoker     Types: Cigarettes     Quit date:      Years since quittin 2    Smokeless tobacco: Never Used   Substance and Sexual Activity    Alcohol use: Never     Frequency: Never    Drug use: Never    Sexual activity: Not on file   Lifestyle    Physical activity     Days per week: Not on file     Minutes per session: Not on file    Stress: Not on file   Relationships    Social connections     Talks on phone: Not on file     Gets together: Not on file     Attends Rastafarian service: Not on file     Active member of club or organization: Not on file     Attends meetings of clubs or organizations: Not on file     Relationship status: Not on file    Intimate partner violence     Fear of current or ex partner: Not on file     Emotionally abused: Not on file     Physically abused: Not on file     Forced sexual activity: Not on file   Other Topics Concern    Not on file   Social History Narrative    ** Merged History Encounter **         Lives with adult children  [place]      Family History   Problem Relation Age of Onset    Diabetes Mother     Heart attack Mother         MI    Stroke Father         CVA    Hypothyroidism Sister     Heart disease Family         cardiovascular disease    Hypertension Family      Past Surgical History:   Procedure Laterality Date    CARDIAC CATHETERIZATION      CARDIAC SURGERY  2000    open heart     CHOLECYSTECTOMY      CORONARY ANGIOPLASTY WITH STENT PLACEMENT      last assessed 6/29/16    CORONARY ANGIOPLASTY WITH STENT PLACEMENT      EAR SURGERY  1984    WY COLONOSCOPY FLX DX W/COLLJ SPEC WHEN PFRMD N/A 6/22/2017    Procedure: EGD AND COLONOSCOPY;  Surgeon: Mary Alice Underwood MD;  Location:  GI LAB;   Service: Gastroenterology    WY FIX LID PTOSIS,FASANELLA-SERVAT Bilateral 2/25/2021    Procedure: UPPER LID MULLERECTOMY;  Surgeon: Shila Wild MD;  Location: [address] [place] [address];  Service: Ophthalmology    US GUIDED THYROID BIOPSY  5/29/2020       Current Outpatient Medications:     acetaminophen (TYLENOL) 500 mg tablet, Take 500 mg by mouth every 6 (six) hours as needed for mild pain, Disp: , Rfl:     aluminum-magnesium hydroxide 200-200 MG/5ML suspension, Take 5 mL by mouth every 6 (six) hours as needed for heartburn, Disp: , Rfl:     aspirin 81 MG tablet, Take 1 tablet by mouth daily, Disp: , Rfl:     atorvastatin (LIPITOR) 80 mg tablet, take 1 tablet by mouth once daily, Disp: 90 tablet, Rfl: 0    conjugated estrogens (PREMARIN) vaginal cream, Apply a pea size amount of the estrogen cream to the opening of the vagina three nights per week , Disp: , Rfl:     Eliquis 5 MG, take 1 tablet by mouth every 12 hours, Disp: 60 tablet, Rfl: 0    estradiol (ESTRACE) 0 1 mg/g vaginal cream, , Disp: , Rfl:     fluticasone (FLONASE) 50 mcg/act nasal spray, 1 spray into each nostril daily, Disp: 1 Bottle, Rfl: 1    latanoprost (XALATAN) 0 005 % ophthalmic solution, Administer 1 drop to both eyes daily at bedtime , Disp: , Rfl:     loratadine (CLARITIN) 10 mg tablet, take 1 tablet by mouth once daily, Disp: 90 tablet, Rfl: 1    losartan (COZAAR) 100 MG tablet, take 1 tablet by mouth once daily, Disp: 30 tablet, Rfl: 0    meclizine (ANTIVERT) 12 5 MG tablet, Take 1 tablet (12 5 mg total) by mouth every 8 (eight) hours as needed for dizziness, Disp: 20 tablet, Rfl: 0    metoprolol tartrate (LOPRESSOR) 50 mg tablet, take 1 tablet by mouth every 12 hours, Disp: 60 tablet, Rfl: 2    metroNIDAZOLE (METROGEL) 1 % gel, Apply topically daily, Disp: 45 g, Rfl: 0    nitroglycerin (NITROSTAT) 0 4 mg SL tablet, Place 1 tablet (0 4 mg total) under the tongue every 5 (five) minutes as needed for chest pain, Disp: 90 tablet, Rfl: 0    omeprazole (PriLOSEC) 40 MG capsule, take 1 capsule by mouth once daily, Disp: 30 capsule, Rfl: 0    polymyxin b-trimethoprim (POLYTRIM) ophthalmic solution, , Disp: , Rfl:     prednisoLONE acetate (PRED FORTE) 1 % ophthalmic suspension, , Disp: , Rfl:     ranolazine (RANEXA) 500 mg 12 hr tablet, take 1 tablet by mouth twice a day, Disp: 60 tablet, Rfl: 8  Allergies   Allergen Reactions    Ibuprofen Other (See Comments)     Cannot take per her doctor       Labs:  Hospital Outpatient Visit on 02/12/2021   Component Date Value    Protocol Name 02/12/2021 [facility]     Time In Exercise Phase 02/12/2021 00:03:00     MAX  SYSTOLIC BP 49/43/7828 808     Max Diastolic Bp 82/38/8649 454     Max Heart Rate 02/12/2021 92     Max Predicted Heart Rate 02/12/2021 153     Reason for Termination 02/12/2021 TEST COMPLETE        Test Indication 02/12/2021                      Value:Dyspnea  Fatigue  Syncope      Target Hr Formular 02/12/2021 (220 - Age)*100%     Chest Pain Statement 02/12/2021 none    Lab on 11/02/2020   Component Date Value    Sodium 11/02/2020 142     Potassium 11/02/2020 4 2     Chloride 11/02/2020 109*    CO2 11/02/2020 29     ANION GAP 11/02/2020 4     BUN 11/02/2020 17     Creatinine 11/02/2020 0 80     Glucose, Fasting 11/02/2020 92     Calcium 11/02/2020 8 9     AST 11/02/2020 20     ALT 11/02/2020 25     Alkaline Phosphatase 11/02/2020 82     Total Protein 11/02/2020 6 8     Albumin 11/02/2020 3 5     Total Bilirubin 11/02/2020 0 62     eGFR 11/02/2020 77      Imaging: No results found  Review of Systems:  Review of Systems   Constitutional: Negative for activity change and fatigue  HENT: Negative for hearing loss and nosebleeds  Eyes: Negative for visual disturbance  Respiratory: Negative for shortness of breath, wheezing and stridor  Cardiovascular: Negative for chest pain, palpitations and leg swelling  Gastrointestinal: Negative for abdominal pain, anal bleeding and blood in stool  Endocrine: Negative for cold intolerance  Genitourinary: Negative for hematuria  Musculoskeletal: Negative for arthralgias, gait problem and myalgias  Skin: Negative for pallor and rash  Allergic/Immunologic: Negative for immunocompromised state  Neurological: Negative for dizziness, syncope and speech difficulty  Hematological: Bruises/bleeds easily  Psychiatric/Behavioral: Positive for sleep disturbance  The patient is not nervous/anxious  Physical Exam:  Physical Exam  Vitals signs reviewed  Constitutional:       General: She is not in acute distress  Appearance: Normal appearance  She is obese  She is not ill-appearing, toxic-appearing or diaphoretic  Eyes:      General: No scleral icterus  Neck:      Vascular: No carotid bruit  Cardiovascular:      Rate and Rhythm: Normal rate and regular rhythm  Pulses: Normal pulses  Heart sounds: Normal heart sounds  No murmur  No friction rub  No gallop  Pulmonary:      Effort: Pulmonary effort is normal  No respiratory distress  Breath sounds: Normal breath sounds  No stridor   No wheezing, rhonchi or rales    Skin:     General: Skin is warm and dry  Capillary Refill: Capillary refill takes less than 2 seconds  Coloration: Skin is not jaundiced  Findings: No bruising or erythema  Neurological:      General: No focal deficit present  Mental Status: She is alert  Psychiatric:         Mood and Affect: Mood normal          Discussion/Summary: coronary artery disease, complex single bypass surgery with LIMA to the LAD 20 years ago, failure of the LIMA  Last catheterization 6 years ago, lima to the LAD was occluded, there was a high-grade lesion in the proximal LAD into a diagonal that was collateralizing the LAD she underwent PTCA drug stent of the diagonal   There was mild-to-moderate disease in the circumflex and RCA system in the setting of small coronary arteries  Echocardiogram 2 years ago revealed normal left systolic function with stage II diastolic dysfunction and mild mitral and tricuspid insufficiency with estimated normal pulmonary pressures suggested by Doppler criteria angina pattern is stable, recent stress test personally reviewed revealed ejection fraction 49% and large anteroapical apical defect fixed  No ischemia which is consistent with anatomy  Based on his control angina pattern and unchanged anatomy favor no catheterization as she was recommended  Explained the rational for that  Patient was interviewed in [facility] [place]  This note was completed in part utilizing [employer] direct voice recognition software  Grammatical errors, random word insertion, spelling mistakes, and incomplete sentences may be an occasional consequence of the system secondary to software limitations, ambient noise and hardware issues  At the time of dictation, efforts were made to edit, clarify and /or correct errors  Please read the chart carefully and recognize, using context, where substitutions have occurred    If you have any questions or concerns about the context, text or information contained within the body of this dictation, please contact myself, the provider, for further clarification

## 2021-03-18 DIAGNOSIS — I82.409 RECURRENT DEEP VENOUS THROMBOSIS (HCC): ICD-10-CM

## 2021-03-18 DIAGNOSIS — K21.9 GASTROESOPHAGEAL REFLUX DISEASE: ICD-10-CM

## 2021-03-18 DIAGNOSIS — I10 BENIGN ESSENTIAL HYPERTENSION: ICD-10-CM

## 2021-03-18 DIAGNOSIS — E78.5 HYPERLIPIDEMIA, UNSPECIFIED HYPERLIPIDEMIA TYPE: ICD-10-CM

## 2021-03-19 RX ORDER — ATORVASTATIN CALCIUM 80 MG/1
TABLET, FILM COATED ORAL
Qty: 90 TABLET | Refills: 0 | Status: SHIPPED | OUTPATIENT
Start: 2021-03-19 | End: 2021-06-15

## 2021-03-19 RX ORDER — OMEPRAZOLE 40 MG/1
CAPSULE, DELAYED RELEASE ORAL
Qty: 30 CAPSULE | Refills: 0 | Status: SHIPPED | OUTPATIENT
Start: 2021-03-19 | End: 2021-04-21

## 2021-03-19 RX ORDER — APIXABAN 5 MG/1
TABLET, FILM COATED ORAL
Qty: 60 TABLET | Refills: 0 | Status: SHIPPED | OUTPATIENT
Start: 2021-03-19 | End: 2021-04-21

## 2021-03-19 RX ORDER — LOSARTAN POTASSIUM 100 MG/1
TABLET ORAL
Qty: 30 TABLET | Refills: 0 | Status: SHIPPED | OUTPATIENT
Start: 2021-03-19 | End: 2021-04-21

## 2021-04-19 DIAGNOSIS — I82.409 RECURRENT DEEP VENOUS THROMBOSIS (HCC): ICD-10-CM

## 2021-04-19 DIAGNOSIS — I20.9 ANGINA, CLASS III (HCC): ICD-10-CM

## 2021-04-19 DIAGNOSIS — K21.9 GASTROESOPHAGEAL REFLUX DISEASE: ICD-10-CM

## 2021-04-19 DIAGNOSIS — I10 BENIGN ESSENTIAL HYPERTENSION: ICD-10-CM

## 2021-04-21 RX ORDER — METOPROLOL TARTRATE 50 MG/1
TABLET, FILM COATED ORAL
Qty: 60 TABLET | Refills: 2 | Status: SHIPPED | OUTPATIENT
Start: 2021-04-21 | End: 2021-08-23 | Stop reason: SDUPTHER

## 2021-04-21 RX ORDER — OMEPRAZOLE 40 MG/1
CAPSULE, DELAYED RELEASE ORAL
Qty: 30 CAPSULE | Refills: 3 | Status: SHIPPED | OUTPATIENT
Start: 2021-04-21 | End: 2021-08-23 | Stop reason: SDUPTHER

## 2021-04-21 RX ORDER — LOSARTAN POTASSIUM 100 MG/1
TABLET ORAL
Qty: 30 TABLET | Refills: 3 | Status: SHIPPED | OUTPATIENT
Start: 2021-04-21 | End: 2021-08-23 | Stop reason: SDUPTHER

## 2021-04-21 RX ORDER — APIXABAN 5 MG/1
TABLET, FILM COATED ORAL
Qty: 60 TABLET | Refills: 3 | Status: SHIPPED | OUTPATIENT
Start: 2021-04-21 | End: 2021-08-23 | Stop reason: SDUPTHER

## 2021-06-04 RX ORDER — ESTRADIOL 0.1 MG/G
CREAM VAGINAL
COMMUNITY
Start: 2021-04-05

## 2021-06-07 ENCOUNTER — OFFICE VISIT (OUTPATIENT)
Dept: FAMILY MEDICINE CLINIC | Facility: CLINIC | Age: 66
End: 2021-06-07

## 2021-06-07 VITALS
DIASTOLIC BLOOD PRESSURE: 72 MMHG | HEART RATE: 71 BPM | HEIGHT: 64 IN | OXYGEN SATURATION: 98 % | WEIGHT: 190.6 LBS | SYSTOLIC BLOOD PRESSURE: 138 MMHG | TEMPERATURE: 96.8 F | RESPIRATION RATE: 18 BRPM | BODY MASS INDEX: 32.54 KG/M2

## 2021-06-07 DIAGNOSIS — M75.21 BICEPS TENDINITIS OF RIGHT UPPER EXTREMITY: ICD-10-CM

## 2021-06-07 DIAGNOSIS — L71.9 ROSACEA: Primary | ICD-10-CM

## 2021-06-07 PROCEDURE — 99213 OFFICE O/P EST LOW 20 MIN: CPT | Performed by: FAMILY MEDICINE

## 2021-06-07 PROCEDURE — 3008F BODY MASS INDEX DOCD: CPT | Performed by: FAMILY MEDICINE

## 2021-06-07 PROCEDURE — 1036F TOBACCO NON-USER: CPT | Performed by: FAMILY MEDICINE

## 2021-06-07 PROCEDURE — 3075F SYST BP GE 130 - 139MM HG: CPT | Performed by: FAMILY MEDICINE

## 2021-06-07 PROCEDURE — 3078F DIAST BP <80 MM HG: CPT | Performed by: FAMILY MEDICINE

## 2021-06-07 PROCEDURE — 1160F RVW MEDS BY RX/DR IN RCRD: CPT | Performed by: FAMILY MEDICINE

## 2021-06-07 RX ORDER — CELECOXIB 100 MG/1
100 CAPSULE ORAL 2 TIMES DAILY
Qty: 14 CAPSULE | Refills: 0 | Status: ON HOLD | OUTPATIENT
Start: 2021-06-07 | End: 2021-08-26

## 2021-06-07 NOTE — ASSESSMENT & PLAN NOTE
Most likely secondary to overuse  Have advised Celebrex b i d  for the next 7 days in light of anticoagulation medication  Referral placed to hand PT/OT    Patient may also benefit from using ice to the affected area as needed

## 2021-06-07 NOTE — ASSESSMENT & PLAN NOTE
Erythema time  Discussed treatment options including avoidance of sun, spicy foods and any other triggers  Discussed the role of laser therapy in minimizing appearance of rosacea however patient would like a trial of vaso constrictive topical treatment at this time  Initiate brimonidine gel daily as needed  Referral placed to Dermatology

## 2021-06-07 NOTE — PROGRESS NOTES
Assessment/Plan:    Rosacea  Erythema time  Discussed treatment options including avoidance of sun, spicy foods and any other triggers  Discussed the role of laser therapy in minimizing appearance of rosacea however patient would like a trial of vaso constrictive topical treatment at this time  Initiate brimonidine gel daily as needed  Referral placed to Dermatology  Biceps tendinitis of right upper extremity  Most likely secondary to overuse  Have advised Celebrex b i d  for the next 7 days in light of anticoagulation medication  Referral placed to hand PT/OT  Patient may also benefit from using ice to the affected area as needed       Diagnoses and all orders for this visit:    Rosacea  -     Ambulatory referral to Dermatology; Future  -     Brimonidine Tartrate 0 33 % GEL; Apply 1 application topically daily as needed (for flushing)    Biceps tendinitis of right upper extremity  -     celecoxib (CeleBREX) 100 mg capsule; Take 1 capsule (100 mg total) by mouth 2 (two) times a day  -     Ambulatory referral to PT/OT hand therapy; Future    Other orders  -     estradiol (ESTRACE) 0 1 mg/g vaginal cream; INSERT 1 GRAM INTO THE VAGINA 2 TIMES A WEEK          Subjective:      Patient ID: Vanna Maxwell is a 78 y o  female  Vanna Maxwell is a 78 y o  female, presents to the clinic for concerns regarding persistent R arm pain  Pt reports that pain presented insidiously approx 20 days prior  Pt reports no hx of associated trauma which caused her pain  She reports sharp pain which originates at her elbow and extends into her forearm  It is worse with daily activities such as cooking and cleaning  She has been taking tylenol which has been minimally helpful  Denies associated weakness, numbness, or tingling  She also reports persistence of her facial "rash" over the past year despite treatment   Her daughter in law tried to make an appointment w/ dermatology however was unsuccessful in doing so  She would like to discuss treatment options  The following portions of the patient's history were reviewed and updated as appropriate: She  has a past medical history of Angina at rest [facility]), Coronary artery disease, Depression, Heart attack ([address] ), History of angioplasty, History of cardiac cath (2003), History of DVT (deep vein thrombosis) (5/18/2020), HLD (hyperlipidemia), Hyperlipidemia, Hypertension, and Wears hearing aid in right ear    Patient Active Problem List    Diagnosis Date Noted    Biceps tendinitis of right upper extremity 06/07/2021    History of PTCA 02/23/2021    Visual disorder 02/23/2021    Benign essential hypertension 10/26/2020    COVID-19 SCREENING FOR TRAVEL 10/26/2020    Preop general physical exam 09/22/2020    Burning sensation of feet 07/20/2020    Rosacea 06/15/2020    History of DVT (deep vein thrombosis) 05/18/2020    Left thyroid nodule 04/13/2020    Dental caries 07/17/2019    Need for 23-polyvalent pneumococcal polysaccharide vaccine 07/11/2019    Nonintractable episodic headache 06/24/2019    Pain of both hip joints 12/10/2018    Acute pain of left knee 12/10/2018    Hypercholesterolemia 12/10/2018    Recurrent deep venous thrombosis (HCC) 04/20/2018    Restless leg syndrome 02/08/2018    Allergic rhinitis 09/30/2016    Microscopic hematuria 09/13/2016    Atrophic vaginitis 08/23/2016    Uterovaginal prolapse, incomplete 08/23/2016    Fatigue 06/29/2016    Edema, lower extremity 08/04/2015    Glaucoma, open angle 05/28/2015    Hearing difficulty 05/28/2015    CAD S/P percutaneous coronary angioplasty 05/26/2015    Angina, class III ([facility] [address] ) 05/26/2015    Coronary artery disease 05/19/2015    Hypertension, essential, benign 05/19/2015    Acid reflux disease 05/19/2015    Dyslipidemia 05/19/2015    Osteoarthritis of knee 05/19/2015    Scoliosis 05/19/2015     She  has a past surgical history that includes Cardiac surgery (2000); Cholecystectomy; EAR SURGERY (1984); pr colonoscopy flx dx w/collj spec when pfrmd (N/A, 6/22/2017); Coronary angioplasty with stent; Cardiac catheterization; Coronary angioplasty with stent; US guided thyroid biopsy (5/29/2020); and pr fix lid ptosis,fasanella-servat (Bilateral, 2/25/2021)  Her family history includes Diabetes in her mother; Heart attack in her mother; Heart disease in her family; Hypertension in her family; Hypothyroidism in her sister; Stroke in her father  She  reports that she quit smoking about 26 years ago  Her smoking use included cigarettes  She has never used smokeless tobacco  She reports that she does not drink alcohol or use drugs  Current Outpatient Medications   Medication Sig Dispense Refill    acetaminophen (TYLENOL) 500 mg tablet Take 500 mg by mouth every 6 (six) hours as needed for mild pain      aluminum-magnesium hydroxide 200-200 MG/5ML suspension Take 5 mL by mouth every 6 (six) hours as needed for heartburn      atorvastatin (LIPITOR) 80 mg tablet take 1 tablet by mouth once daily 90 tablet 0    conjugated estrogens (PREMARIN) vaginal cream Apply a pea size amount of the estrogen cream to the opening of the vagina three nights per week        Eliquis 5 MG take 1 tablet by mouth every 12 hours 60 tablet 3    estradiol (ESTRACE) 0 1 mg/g vaginal cream       estradiol (ESTRACE) 0 1 mg/g vaginal cream INSERT 1 GRAM INTO THE VAGINA 2 TIMES A WEEK      fluticasone (FLONASE) 50 mcg/act nasal spray 1 spray into each nostril daily 1 Bottle 1    latanoprost (XALATAN) 0 005 % ophthalmic solution Administer 1 drop to both eyes daily at bedtime       loratadine (CLARITIN) 10 mg tablet take 1 tablet by mouth once daily 90 tablet 1    losartan (COZAAR) 100 MG tablet take 1 tablet by mouth once daily 30 tablet 3    meclizine (ANTIVERT) 12 5 MG tablet Take 1 tablet (12 5 mg total) by mouth every 8 (eight) hours as needed for dizziness 20 tablet 0    metoprolol tartrate (LOPRESSOR) 50 mg tablet take 1 tablet by mouth every 12 hours 60 tablet 2    metroNIDAZOLE (METROGEL) 1 % gel Apply topically daily 45 g 0    nitroglycerin (NITROSTAT) 0 4 mg SL tablet Place 1 tablet (0 4 mg total) under the tongue every 5 (five) minutes as needed for chest pain 90 tablet 0    omeprazole (PriLOSEC) 40 MG capsule take 1 capsule by mouth once daily 30 capsule 3    ranolazine (RANEXA) 500 mg 12 hr tablet take 1 tablet by mouth twice a day 60 tablet 8    aspirin 81 MG tablet Take 1 tablet by mouth daily      Brimonidine Tartrate 0 33 % GEL Apply 1 application topically daily as needed (for flushing) 30 g 1    celecoxib (CeleBREX) 100 mg capsule Take 1 capsule (100 mg total) by mouth 2 (two) times a day 14 capsule 0    polymyxin b-trimethoprim (POLYTRIM) ophthalmic solution       prednisoLONE acetate (PRED FORTE) 1 % ophthalmic suspension        No current facility-administered medications for this visit  Current Outpatient Medications on File Prior to Visit   Medication Sig    acetaminophen (TYLENOL) 500 mg tablet Take 500 mg by mouth every 6 (six) hours as needed for mild pain    aluminum-magnesium hydroxide 200-200 MG/5ML suspension Take 5 mL by mouth every 6 (six) hours as needed for heartburn    atorvastatin (LIPITOR) 80 mg tablet take 1 tablet by mouth once daily    conjugated estrogens (PREMARIN) vaginal cream Apply a pea size amount of the estrogen cream to the opening of the vagina three nights per week      Eliquis 5 MG take 1 tablet by mouth every 12 hours    estradiol (ESTRACE) 0 1 mg/g vaginal cream     estradiol (ESTRACE) 0 1 mg/g vaginal cream INSERT 1 GRAM INTO THE VAGINA 2 TIMES A WEEK    fluticasone (FLONASE) 50 mcg/act nasal spray 1 spray into each nostril daily    latanoprost (XALATAN) 0 005 % ophthalmic solution Administer 1 drop to both eyes daily at bedtime     loratadine (CLARITIN) 10 mg tablet take 1 tablet by mouth once daily    losartan (COZAAR) 100 MG tablet take 1 tablet by mouth once daily    meclizine (ANTIVERT) 12 5 MG tablet Take 1 tablet (12 5 mg total) by mouth every 8 (eight) hours as needed for dizziness    metoprolol tartrate (LOPRESSOR) 50 mg tablet take 1 tablet by mouth every 12 hours    metroNIDAZOLE (METROGEL) 1 % gel Apply topically daily    nitroglycerin (NITROSTAT) 0 4 mg SL tablet Place 1 tablet (0 4 mg total) under the tongue every 5 (five) minutes as needed for chest pain    omeprazole (PriLOSEC) 40 MG capsule take 1 capsule by mouth once daily    ranolazine (RANEXA) 500 mg 12 hr tablet take 1 tablet by mouth twice a day    aspirin 81 MG tablet Take 1 tablet by mouth daily    polymyxin b-trimethoprim (POLYTRIM) ophthalmic solution     prednisoLONE acetate (PRED FORTE) 1 % ophthalmic suspension      No current facility-administered medications on file prior to visit  She is allergic to ibuprofen       Review of Systems   Constitutional: Negative for chills, diaphoresis and fever  Respiratory: Negative for cough and shortness of breath  Cardiovascular: Negative for chest pain  Gastrointestinal: Negative for abdominal pain, diarrhea, nausea and vomiting  Genitourinary: Negative for dysuria  Musculoskeletal: Negative for arthralgias  R forearm pain   Skin: Positive for rash  Facial rash   Neurological: Negative for dizziness and headaches  Psychiatric/Behavioral: Negative for suicidal ideas  Objective:      /72 (BP Location: Left arm, Patient Position: Sitting, Cuff Size: Standard)   Pulse 71   Temp (!) 96 8 °F (36 °C) (Temporal)   Resp 18   Ht 5' 4" (1 626 m)   Wt 86 5 kg (190 lb 9 6 oz)   SpO2 98%   Breastfeeding No   BMI 32 72 kg/m²          Physical Exam  Vitals signs reviewed  Constitutional:       General: She is not in acute distress  Appearance: She is well-developed  Eyes:      General: No scleral icterus       Conjunctiva/sclera: Conjunctivae normal    Neck: Musculoskeletal: Normal range of motion and neck supple  Cardiovascular:      Rate and Rhythm: Normal rate  Heart sounds: Normal heart sounds  No murmur  No friction rub  Pulmonary:      Effort: Pulmonary effort is normal  No respiratory distress  Breath sounds: Normal breath sounds  No wheezing  Abdominal:      Palpations: Abdomen is soft  Tenderness: There is no abdominal tenderness  Musculoskeletal: Normal range of motion  Right elbow: She exhibits normal range of motion  No tenderness found  Right wrist: She exhibits normal range of motion and no tenderness  Comments: Speed's test illicited pain at the insertion of the biceps tendon  Pain reproduced with palpation of biceps insertion and brachioradialis   Skin:     General: Skin is warm and dry  Findings: Rash present  Neurological:      Mental Status: She is alert and oriented to person, place, and time  Sensory: No sensory deficit  Motor: No weakness

## 2021-06-10 ENCOUNTER — TELEPHONE (OUTPATIENT)
Dept: FAMILY MEDICINE CLINIC | Facility: CLINIC | Age: 66
End: 2021-06-10

## 2021-06-10 NOTE — TELEPHONE ENCOUNTER
SIGNATURES NEEDED FOR [employer] FORM RECEIVED VIA FAX  WILL BE PLACED IN FORM BIN FOR MA PICKUP      Case number : [id number]

## 2021-06-14 DIAGNOSIS — E78.5 HYPERLIPIDEMIA, UNSPECIFIED HYPERLIPIDEMIA TYPE: ICD-10-CM

## 2021-06-15 RX ORDER — ATORVASTATIN CALCIUM 80 MG/1
80 TABLET, FILM COATED ORAL DAILY
Qty: 90 TABLET | Refills: 1 | Status: SHIPPED | OUTPATIENT
Start: 2021-06-15

## 2021-06-15 NOTE — TELEPHONE ENCOUNTER
Form picked up by clinical 06/15/21  Patient will be notified within 5 business days of completion of forms/if we are unable to complete       (FORM WAS JUST PLACED IN THE BIN)

## 2021-07-19 ENCOUNTER — TELEPHONE (OUTPATIENT)
Dept: CARDIOLOGY CLINIC | Facility: CLINIC | Age: 66
End: 2021-07-19

## 2021-07-21 ENCOUNTER — TELEPHONE (OUTPATIENT)
Dept: FAMILY MEDICINE CLINIC | Facility: CLINIC | Age: 66
End: 2021-07-21

## 2021-07-21 DIAGNOSIS — J30.1 ALLERGIC RHINITIS DUE TO POLLEN, UNSPECIFIED SEASONALITY: ICD-10-CM

## 2021-07-21 NOTE — TELEPHONE ENCOUNTER
Spoke to patient's daughter Theresa Guzman called to advise I will start prior authorization for loratadine

## 2021-07-21 NOTE — TELEPHONE ENCOUNTER
Left message for patient to call back    She left message on refill line but didn't state what she needed

## 2021-07-22 DIAGNOSIS — I25.118 CORONARY ARTERY DISEASE INVOLVING NATIVE HEART WITH OTHER FORM OF ANGINA PECTORIS, UNSPECIFIED VESSEL OR LESION TYPE (HCC): ICD-10-CM

## 2021-07-22 RX ORDER — RANOLAZINE 500 MG/1
500 TABLET, EXTENDED RELEASE ORAL 2 TIMES DAILY
Qty: 60 TABLET | Refills: 8 | Status: SHIPPED | OUTPATIENT
Start: 2021-07-22

## 2021-07-22 RX ORDER — LORATADINE 10 MG/1
10 TABLET ORAL DAILY
Qty: 90 TABLET | Refills: 0 | Status: SHIPPED | OUTPATIENT
Start: 2021-07-22

## 2021-07-22 NOTE — TELEPHONE ENCOUNTER
She doesn't need a prior auth, looks like she just needs a refill  It's covered AND available OTC so I will not be proceeding with a prior auth at this time      Will request from faculty but she will need appt with a new PCP in the next 1-2 months

## 2021-07-22 NOTE — TELEPHONE ENCOUNTER
Hi,   Please address this refill  Patient has upcoming appointment with you on 8/4/21     Thanks,   Dr Centeno Rough

## 2021-07-23 ENCOUNTER — TELEPHONE (OUTPATIENT)
Dept: FAMILY MEDICINE CLINIC | Facility: CLINIC | Age: 66
End: 2021-07-23

## 2021-07-29 DIAGNOSIS — J30.1 ALLERGIC RHINITIS DUE TO POLLEN, UNSPECIFIED SEASONALITY: ICD-10-CM

## 2021-07-29 RX ORDER — LORATADINE 10 MG/1
10 TABLET ORAL DAILY
Qty: 90 TABLET | Refills: 0 | Status: CANCELLED | OUTPATIENT
Start: 2021-07-29

## 2021-08-04 ENCOUNTER — CONSULT (OUTPATIENT)
Dept: FAMILY MEDICINE CLINIC | Facility: CLINIC | Age: 66
End: 2021-08-04

## 2021-08-04 VITALS
DIASTOLIC BLOOD PRESSURE: 90 MMHG | SYSTOLIC BLOOD PRESSURE: 142 MMHG | OXYGEN SATURATION: 98 % | WEIGHT: 188 LBS | TEMPERATURE: 97.7 F | BODY MASS INDEX: 32.27 KG/M2 | RESPIRATION RATE: 17 BRPM | HEART RATE: 62 BPM

## 2021-08-04 DIAGNOSIS — Z01.818 PREOP GENERAL PHYSICAL EXAM: Primary | ICD-10-CM

## 2021-08-04 PROCEDURE — 99213 OFFICE O/P EST LOW 20 MIN: CPT | Performed by: FAMILY MEDICINE

## 2021-08-04 NOTE — PROGRESS NOTES
FAMILY PRACTICE PRE-OPERATIVE EVALUATION  [facility] PHYSICIAN GROUP [facility] MINAL    NAME: Kim Campos  AGE: 78 y o  SEX: female  : 1955     DATE: 2021    Family Practice Pre-Operative Evaluation      Chief Complaint: Pre-operative Evaluation     Surgery: Bilateral upper lid Blepharoplasty  Anticipated Date of Surgery: 21  Referring Provider: Self, Referral       History of Present Illness:     Kim Campos is a 78 y o  female who presents to the office today for a preoperative consultation at the request of surgeon, Dr Alicia Perales MD, who plans on performing surgery on 21  Planned anesthesia is MAC  Patient has a bleeding risk currently: taking aspirin 81 Daily, and eliquis 5mg BID  Patient does not have objections to receiving blood products if needed  Current anti-platelet/anti-coagulation medications that the patient is prescribed includes: Aspirin and Apixaban (Eliquis)  Aspirin 81mg daily, Eliquis 5mg BID     Assessment of Chronic Conditions:   - Coronary Artery Disease: w/ percutaneous coronary angioplasty on 2015     Assessment of Cardiac Risk:  · Denies unstable or severe angina or MI in the last 6 weeks or history of stent placement in the last year   · Denies decompensated heart failure (e g  New onset heart failure, NYHA functional class IV heart failure, or worsening existing heart failure)  · Denies significant arrhythmias such as high grade AV block, symptomatic ventricular arrhythmia, newly recognized ventricular tachycardia, supraventricular tachycardia with resting heart rate >100, or symptomatic bradycardia     Exercise Capacity:  · Able to walk 4 blocks without symptoms?: Yes  · Able to walk 2 flights without symptoms?: Yes    Prior Anesthesia Reactions: No     Personal history of venous thromboembolic disease? Yes    History of steroid use for >2 weeks within last year?  No         Review of Systems:     Review of Systems   Constitutional: Negative for chills and fever  HENT: Negative for ear pain and sore throat  Eyes: Negative for pain and visual disturbance  Respiratory: Negative for cough and shortness of breath  Cardiovascular: Negative for chest pain and palpitations  Gastrointestinal: Negative for abdominal pain, blood in stool, constipation, diarrhea, nausea and vomiting  Genitourinary: Negative for dysuria and hematuria  Skin: Negative for color change and rash  Neurological: Negative for seizures and syncope  All other systems reviewed and are negative  Current Problem List:     Patient Active Problem List   Diagnosis    Coronary artery disease    CAD S/P percutaneous coronary angioplasty    Hypertension, essential, benign    Atrophic vaginitis    Angina, class III (HCC)    Allergic rhinitis    Acid reflux disease    Dyslipidemia    Edema, lower extremity    Fatigue    Glaucoma, open angle    Hearing difficulty    Osteoarthritis of knee    Scoliosis    Uterovaginal prolapse, incomplete    Microscopic hematuria    Restless leg syndrome    Recurrent deep venous thrombosis (HCC)    Pain of both hip joints    Acute pain of left knee    Hypercholesterolemia    Nonintractable episodic headache    Need for 23-polyvalent pneumococcal polysaccharide vaccine    Dental caries    Left thyroid nodule    History of DVT (deep vein thrombosis)    Rosacea    Burning sensation of feet    Preop general physical exam    Benign essential hypertension    COVID-19 SCREENING FOR TRAVEL    History of PTCA    Visual disorder    Biceps tendinitis of right upper extremity       Allergies:      Allergies   Allergen Reactions    Ibuprofen Other (See Comments)     Cannot take per her doctor       Current Medications:       Current Outpatient Medications:     acetaminophen (TYLENOL) 500 mg tablet, Take 500 mg by mouth every 6 (six) hours as needed for mild pain, Disp: , Rfl:    aluminum-magnesium hydroxide 200-200 MG/5ML suspension, Take 5 mL by mouth every 6 (six) hours as needed for heartburn, Disp: , Rfl:     aspirin 81 MG tablet, Take 1 tablet by mouth daily, Disp: , Rfl:     atorvastatin (LIPITOR) 80 mg tablet, Take 1 tablet (80 mg total) by mouth daily, Disp: 90 tablet, Rfl: 1    conjugated estrogens (PREMARIN) vaginal cream, Apply a pea size amount of the estrogen cream to the opening of the vagina three nights per week , Disp: , Rfl:     Eliquis 5 MG, take 1 tablet by mouth every 12 hours, Disp: 60 tablet, Rfl: 3    fluticasone (FLONASE) 50 mcg/act nasal spray, 1 spray into each nostril daily, Disp: 1 Bottle, Rfl: 1    latanoprost (XALATAN) 0 005 % ophthalmic solution, Administer 1 drop to both eyes daily at bedtime , Disp: , Rfl:     loratadine (CLARITIN) 10 mg tablet, Take 1 tablet (10 mg total) by mouth daily, Disp: 90 tablet, Rfl: 0    losartan (COZAAR) 100 MG tablet, take 1 tablet by mouth once daily, Disp: 30 tablet, Rfl: 3    meclizine (ANTIVERT) 12 5 MG tablet, Take 1 tablet (12 5 mg total) by mouth every 8 (eight) hours as needed for dizziness, Disp: 20 tablet, Rfl: 0    metoprolol tartrate (LOPRESSOR) 50 mg tablet, take 1 tablet by mouth every 12 hours, Disp: 60 tablet, Rfl: 2    nitroglycerin (NITROSTAT) 0 4 mg SL tablet, Place 1 tablet (0 4 mg total) under the tongue every 5 (five) minutes as needed for chest pain, Disp: 90 tablet, Rfl: 0    omeprazole (PriLOSEC) 40 MG capsule, take 1 capsule by mouth once daily, Disp: 30 capsule, Rfl: 3    ranolazine (RANEXA) 500 mg 12 hr tablet, Take 1 tablet (500 mg total) by mouth 2 (two) times a day, Disp: 60 tablet, Rfl: 8    celecoxib (CeleBREX) 100 mg capsule, Take 1 capsule (100 mg total) by mouth 2 (two) times a day, Disp: 14 capsule, Rfl: 0    estradiol (ESTRACE) 0 1 mg/g vaginal cream, , Disp: , Rfl:     estradiol (ESTRACE) 0 1 mg/g vaginal cream, INSERT 1 GRAM INTO THE VAGINA 2 TIMES A WEEK, Disp: , Rfl:    metroNIDAZOLE (METROGEL) 1 % gel, Apply topically daily, Disp: 45 g, Rfl: 0    polymyxin b-trimethoprim (POLYTRIM) ophthalmic solution, , Disp: , Rfl:     prednisoLONE acetate (PRED FORTE) 1 % ophthalmic suspension, , Disp: , Rfl:     Past Medical History:       Past Medical History:   Diagnosis Date    [facility])     Coronary artery disease     stents placed    Depression     resolved 6/29/16    Heart attack ([address] )     History of angioplasty     History of cardiac cath 2003    History of DVT (deep vein thrombosis) 5/18/2020    8/10/2017 RIGHT LOWER LIMB: There is acute deep vein thrombosis in several gastrocnemius veins from the proximal to the distal calf   HLD (hyperlipidemia)     Hyperlipidemia     Hypertension     Wears hearing aid in right ear         Past Surgical History:   Procedure Laterality Date    CARDIAC CATHETERIZATION      CARDIAC SURGERY  2000    open heart     CHOLECYSTECTOMY      CORONARY ANGIOPLASTY WITH STENT PLACEMENT      last assessed 6/29/16    CORONARY ANGIOPLASTY WITH STENT PLACEMENT      EAR SURGERY  1984    NH COLONOSCOPY FLX DX W/COLLJ SPEC WHEN PFRMD N/A 6/22/2017    Procedure: EGD AND COLONOSCOPY;  Surgeon: Fausto Preston MD;  Location: [facility]; Service: Gastroenterology    NH FIX LID PTOSIS,FASANELLA-SERVAT Bilateral 2/25/2021    Procedure: UPPER LID MULLERECTOMY;  Surgeon: Felice Pendleton MD;  Location: [address] [place] [address];  Service: Ophthalmology   [place] [address] THYROID BIOPSY  5/29/2020        Family History   Problem Relation Age of Onset    Diabetes Mother     Heart attack Mother         MI    Stroke Father         CVA    Hypothyroidism Sister     Heart disease Family         cardiovascular disease    Hypertension Family         Social History     Socioeconomic History    Marital status:       Spouse name: Not on file    Number of children: Not on file    Years of education: Not on file    Highest education level: Not on file   Occupational History    Not on file   Tobacco Use    Smoking status: Former Smoker     Types: Cigarettes     Quit date:      Years since quittin 6    Smokeless tobacco: Never Used   Vaping Use    Vaping Use: Never used   Substance and Sexual Activity    Alcohol use: Never    Drug use: Never    Sexual activity: Not on file   Other Topics Concern    Not on file   Social History Narrative    ** Merged History Encounter **         Lives with adult children  Sabianist     Social Determinants of Health     Financial Resource Strain:     Difficulty of Paying Living Expenses:    Food Insecurity:     Worried About Running Out of Food in the Last Year:     [address] in the Last Year:    Transportation Needs:     Lack of Transportation (Medical):  Lack of Transportation (Non-Medical):    Physical Activity:     Days of Exercise per Week:     Minutes of Exercise per Session:    Stress:     Feeling of Stress :    Social Connections:     Frequency of Communication with Friends and Family:     Frequency of Social Gatherings with Friends and Family:     Attends Christianity Services:     Active Member of Clubs or Organizations:     Attends Club or Organization Meetings:     Marital Status:    Intimate Partner Violence:     Fear of Current or Ex-Partner:     Emotionally Abused:     Physically Abused:     Sexually Abused:         Physical Exam:     /90 (BP Location: Left arm, Patient Position: Sitting, Cuff Size: Adult)   Pulse 62   Temp 97 7 °F (36 5 °C) (Temporal)   Resp 17   Wt 85 3 kg (188 lb)   SpO2 98%   BMI 32 27 kg/m²     Physical Exam  Constitutional:       General: She is not in acute distress  Appearance: Normal appearance  She is obese  She is not ill-appearing, toxic-appearing or diaphoretic  Eyes:      General: Vision grossly intact  Gaze aligned appropriately  No visual field deficit or scleral icterus  Right eye: No discharge  Left eye: No discharge  Extraocular Movements: Extraocular movements intact  Conjunctiva/sclera: Conjunctivae normal       Right eye: Right conjunctiva is not injected  No chemosis, exudate or hemorrhage  Left eye: Left conjunctiva is not injected  No chemosis, exudate or hemorrhage  Pupils: Pupils are equal, round, and reactive to light  Visual Fields: Right eye visual fields normal and left eye visual fields normal    Cardiovascular:      Rate and Rhythm: Normal rate and regular rhythm  Pulses: Normal pulses  Heart sounds: Normal heart sounds  No murmur heard  No friction rub  No gallop  Pulmonary:      Effort: Pulmonary effort is normal  No respiratory distress  Breath sounds: Normal breath sounds  No stridor  No wheezing, rhonchi or rales  Chest:      Chest wall: No tenderness  Musculoskeletal:      Cervical back: Normal range of motion  Skin:     General: Skin is warm and dry  Capillary Refill: Capillary refill takes less than 2 seconds  Coloration: Skin is not jaundiced or pale  Findings: No bruising, erythema, lesion or rash  Neurological:      General: No focal deficit present  Mental Status: She is alert and oriented to person, place, and time  Psychiatric:         Mood and Affect: Mood normal          Behavior: Behavior normal          Thought Content: Thought content normal          Judgment: Judgment normal           Data:     Pre-operative work-up    Laboratory Results: I have personally reviewed the pertinent laboratory results/reports      EKG: I have personally reviewed pertinent reports  Compared to previous ECG   Sinus Bradycardiac with left axis deviation and wide QRS complex        Previous cardiopulmonary studies within the past year:  · Echocardiogram: last echo was performed on 1/18/2019  · Cardiac Catheterization: last performed 12/12/2015  · Stress Test 2/12/2021: Markedly abnormal study after vasodilation and low level exercise without reproduction of symptoms  Assessment & Recommendations:     1  Preop general physical exam  POCT ECG       Pre-Op Evaluation Assessment  78 y o  female with planned surgery: Bilateral upper lid Blepharoplasty  Known risk factors for perioperative complications: Coronary disease     Pre-Op Evaluation Plan  1  Further preoperative workup as follows:   - Cardiology clearance pending (per pt appointment is scheduled)    2  Medication Management/Recommendations:   Deferred as to cardiology recommendations    4  Patient requires further consultation with: Cardiology     Clearance  Pending Cardiology clearance  Will send filled form on 8/5/21       Luigi Dhaliwal MD  [address]  [place] [id number]  Phone#  [phone]  Fax#  [phone]

## 2021-08-05 PROCEDURE — 93000 ELECTROCARDIOGRAM COMPLETE: CPT | Performed by: FAMILY MEDICINE

## 2021-08-10 ENCOUNTER — TELEPHONE (OUTPATIENT)
Dept: FAMILY MEDICINE CLINIC | Facility: CLINIC | Age: 66
End: 2021-08-10

## 2021-08-13 ENCOUNTER — TELEPHONE (OUTPATIENT)
Dept: FAMILY MEDICINE CLINIC | Facility: CLINIC | Age: 66
End: 2021-08-13

## 2021-08-20 NOTE — PERIOPERATIVE NURSING NOTE
8/20/21 Attempted to complete PAT assessment- was speaking with pt and pts daughter Kemi Causey- started to review medications- pts daughter expressed that she was not sure of instructions regarding Eliquis and ASA HOLDS prior to surgery  This RN found under chart review /letters - per cardiology that ASA is to be held one week prior to surgery  Pt and pts daughter were informed of this, however no Eliquis instructions were indicated in the [facility] visit of 8/4/21 ( stated pending CC)  With it being a Friday and surgery on 8/26/21 - instructed pts daughter to call both PCP [facility] Friday or [facility] for [place] - in an attempt to get instructions for Eliquis  This RN instructed pts daughter Kemi Causey that the PAT interview will be rescheduled - verifying Eliquis HOLD instructions for DOS is priority  Pt and pts daughter did verbalize understanding of this  Informed both pt and pts daughter that PAT will callback prior to surgery to review all information

## 2021-08-23 DIAGNOSIS — I20.9 ANGINA, CLASS III (HCC): ICD-10-CM

## 2021-08-23 DIAGNOSIS — K21.9 GASTROESOPHAGEAL REFLUX DISEASE: ICD-10-CM

## 2021-08-23 DIAGNOSIS — I82.409 RECURRENT DEEP VENOUS THROMBOSIS (HCC): ICD-10-CM

## 2021-08-23 DIAGNOSIS — I10 BENIGN ESSENTIAL HYPERTENSION: ICD-10-CM

## 2021-08-23 RX ORDER — LOSARTAN POTASSIUM 100 MG/1
100 TABLET ORAL DAILY
Qty: 30 TABLET | Refills: 3 | Status: SHIPPED | OUTPATIENT
Start: 2021-08-23

## 2021-08-23 RX ORDER — METOPROLOL TARTRATE 50 MG/1
50 TABLET, FILM COATED ORAL EVERY 12 HOURS
Qty: 60 TABLET | Refills: 2 | Status: SHIPPED | OUTPATIENT
Start: 2021-08-23

## 2021-08-23 RX ORDER — OMEPRAZOLE 40 MG/1
40 CAPSULE, DELAYED RELEASE ORAL DAILY
Qty: 30 CAPSULE | Refills: 3 | Status: SHIPPED | OUTPATIENT
Start: 2021-08-23

## 2021-08-23 NOTE — TELEPHONE ENCOUNTER
Please inform patient that she can stop the eliquis 48hrs before her surgery and restart eliquis 24hrs after the procedure     Thanks,   Dr Adelso Walls

## 2021-08-23 NOTE — TELEPHONE ENCOUNTER
Pt is calling to see when she have to stop the Eliquis  Pt states that she when to the cardiology appt he give her instruction to stop Aspirin but not the Eliquis because he didn't prescribed  Please advise

## 2021-08-25 ENCOUNTER — ANESTHESIA EVENT (OUTPATIENT)
Dept: PERIOP | Facility: HOSPITAL | Age: 66
End: 2021-08-25
Payer: COMMERCIAL

## 2021-08-25 NOTE — PRE-PROCEDURE INSTRUCTIONS
Pre-Surgery Instructions:   Medication Instructions    acetaminophen (TYLENOL) 500 mg tablet Instructed patient per Anesthesia Guidelines  Take morning of surgery with sip water if needed    aluminum-magnesium hydroxide 200-200 MG/5ML suspension do not take morning of surgery    apixaban (Eliquis) 5 mg Patient was instructed by Physician and understands  Stopped 8/24    aspirin 81 MG tablet Patient was instructed by Physician and understands  Stopped 8/21    atorvastatin (LIPITOR) 80 mg tablet Instructed patient per Anesthesia Guidelines  Take morning of surgery with sip water    conjugated estrogens (PREMARIN) vaginal cream do not take after cleansing showers    estradiol (ESTRACE) 0 1 mg/g vaginal cream do not take after cleansing showers    estradiol (ESTRACE) 0 1 mg/g vaginal cream do not take after cleansing showers    fluticasone (FLONASE) 50 mcg/act nasal spray take as directed    latanoprost (XALATAN) 0 005 % ophthalmic solution take as directed    loratadine (CLARITIN) 10 mg tablet Instructed patient per Anesthesia Guidelines  Take morning of surgery with sip water    losartan (COZAAR) 100 MG tablet Instructed patient per Anesthesia Guidelines  Do not take morning of surgery    meclizine (ANTIVERT) 12 5 MG tablet Instructed patient per Anesthesia Guidelines  Take morning of surgery with sip water if needed    metoprolol tartrate (LOPRESSOR) 50 mg tablet Instructed patient per Anesthesia Guidelines  Take morning of surgery with sip water    nitroglycerin (NITROSTAT) 0 4 mg SL tablet Instructed patient per Anesthesia Guidelines  Take as directed    omeprazole (PriLOSEC) 40 MG capsule Instructed patient per Anesthesia Guidelines  Take morning of surgery with sip water    ranolazine (RANEXA) 500 mg 12 hr tablet Instructed patient per Anesthesia Guidelines  Take morning of surgery with sip water   Medical information reviewed with assistance of patients daughter Sosa Vanegas screening negative as per patient  Fully vaccinated  Reviewed pre op medicine and showering instructions with patients daughter Carlos Levine via phone call, verbalizes understanding  Advised for patient to stop taking vitamins, herbal meds, NSAID'S pre op but may take Tylenol products if needed  Advised NPO after MN (8/25) and ASC will call (8/25) with scheduled surgical  time  Patients daughter verbalized understanding

## 2021-08-25 NOTE — ANESTHESIA PREPROCEDURE EVALUATION
Procedure:  BLEPHAROPLASTY UPPER (Bilateral Eye)    Relevant Problems   ANESTHESIA  old chart reviewed  jairo chart reviewed and notes read  pt known to me  no interval change in health      CARDIO  lopressor in AM   stress test result noted  Dr Jose Wray note read   (+) Angina, class III ([address] )   (+) Benign essential hypertension   (+) CAD S/P percutaneous coronary angioplasty   (+) Coronary artery disease   (+) History of PTCA   (+) Hypercholesterolemia   (+) Hypertension, essential, benign   (+) Recurrent deep venous thrombosis (HCC)      ENDO  obesity      GI/HEPATIC   (+) Acid reflux disease      /RENAL (within normal limits)      GYN  PM      HEMATOLOGY (within normal limits)      MUSCULOSKELETAL   (+) Osteoarthritis of knee   (+) Scoliosis      NEURO/PSYCH  glaucoma hx   (+) History of DVT (deep vein thrombosis)   (+) Nonintractable episodic headache   (+) Visual disorder      PULMONARY  ex smoker  likely MARIJA   (-) Smoking   (-) URI (upper respiratory infection)        Physical Exam    Airway    Mallampati score: II  TM Distance: >3 FB  Neck ROM: limited     Dental       Cardiovascular  Cardiovascular exam normal    Pulmonary  Pulmonary exam normal     Other Findings  Fixed upper and lower teeth and in good repair      Anesthesia Plan  ASA Score- 3     Anesthesia Type- IV sedation with anesthesia with ASA Monitors  Additional Monitors:   Airway Plan:           Plan Factors-Exercise tolerance (METS): >4 METS  Chart reviewed  EKG reviewed  Imaging results reviewed  Existing labs reviewed  Patient is not a current smoker  Patient not instructed to abstain from smoking on day of procedure  Patient did not smoke on day of surgery  Induction- intravenous  Postoperative Plan- Plan for postoperative opioid use  Informed Consent- Anesthetic plan and risks discussed with patient  I personally reviewed this patient with the CRNA   Discussed and agreed on the Anesthesia Plan with the CRNA  Procedure:  BLEPHAROPLASTY UPPER (Bilateral Eye)    Relevant Problems   ANESTHESIA  old chart reviewed  jairo chart reviewed and notes read  pt known to me  no interval change in health      CARDIO  lopressor in AM   stress test result noted  Dr Bridget Burgess note read   (+) Angina, class III ([address] )   (+) Benign essential hypertension   (+) CAD S/P percutaneous coronary angioplasty   (+) Coronary artery disease   (+) History of PTCA   (+) Hypercholesterolemia   (+) Hypertension, essential, benign   (+) Recurrent deep venous thrombosis (HCC)      ENDO  obesity      GI/HEPATIC   (+) Acid reflux disease      /RENAL (within normal limits)      GYN  PM      HEMATOLOGY (within normal limits)      MUSCULOSKELETAL   (+) Osteoarthritis of knee   (+) Scoliosis      NEURO/PSYCH  glaucoma hx   (+) History of DVT (deep vein thrombosis)   (+) Nonintractable episodic headache   (+) Visual disorder      PULMONARY  ex smoker  likely MARIJA   (-) Smoking   (-) URI (upper respiratory infection)        Physical Exam    Airway    Mallampati score: II  TM Distance: >3 FB  Neck ROM: limited     Dental       Cardiovascular  Cardiovascular exam normal    Pulmonary  Pulmonary exam normal     Other Findings  Fixed upper and lower teeth and in good repair      Anesthesia Plan  ASA Score- 3     Anesthesia Type- IV sedation with anesthesia with ASA Monitors  Additional Monitors:   Airway Plan:     Comment: Daughter is an excellent   Plan Factors-Exercise tolerance (METS): >4 METS  Chart reviewed  EKG reviewed  Imaging results reviewed  Existing labs reviewed  Patient is not a current smoker  Patient not instructed to abstain from smoking on day of procedure  Patient did not smoke on day of surgery  Induction- intravenous  Postoperative Plan- Plan for postoperative opioid use  Informed Consent- Anesthetic plan and risks discussed with patient and daughter    I personally reviewed this patient with the CRNA  Discussed and agreed on the Anesthesia Plan with the CRNA  Georgette Ormond

## 2021-08-26 ENCOUNTER — HOSPITAL ENCOUNTER (OUTPATIENT)
Facility: HOSPITAL | Age: 66
Setting detail: OUTPATIENT SURGERY
Discharge: HOME | End: 2021-08-26
Attending: OPHTHALMOLOGY | Admitting: OPHTHALMOLOGY
Payer: COMMERCIAL

## 2021-08-26 ENCOUNTER — ANESTHESIA (OUTPATIENT)
Dept: PERIOP | Facility: HOSPITAL | Age: 66
End: 2021-08-26
Payer: COMMERCIAL

## 2021-08-26 VITALS
DIASTOLIC BLOOD PRESSURE: 81 MMHG | BODY MASS INDEX: 32.1 KG/M2 | TEMPERATURE: 97.2 F | HEIGHT: 64 IN | RESPIRATION RATE: 16 BRPM | WEIGHT: 188 LBS | OXYGEN SATURATION: 100 % | SYSTOLIC BLOOD PRESSURE: 189 MMHG | HEART RATE: 59 BPM

## 2021-08-26 RX ORDER — FENTANYL CITRATE 50 UG/ML
INJECTION, SOLUTION INTRAMUSCULAR; INTRAVENOUS AS NEEDED
Status: DISCONTINUED | OUTPATIENT
Start: 2021-08-26 | End: 2021-08-26

## 2021-08-26 RX ORDER — PROMETHAZINE HYDROCHLORIDE 25 MG/ML
12.5 INJECTION, SOLUTION INTRAMUSCULAR; INTRAVENOUS ONCE AS NEEDED
Status: DISCONTINUED | OUTPATIENT
Start: 2021-08-26 | End: 2021-08-26 | Stop reason: HOSPADM

## 2021-08-26 RX ORDER — TETRACAINE HYDROCHLORIDE 5 MG/ML
SOLUTION OPHTHALMIC AS NEEDED
Status: DISCONTINUED | OUTPATIENT
Start: 2021-08-26 | End: 2021-08-26 | Stop reason: HOSPADM

## 2021-08-26 RX ORDER — MAGNESIUM HYDROXIDE 1200 MG/15ML
LIQUID ORAL AS NEEDED
Status: DISCONTINUED | OUTPATIENT
Start: 2021-08-26 | End: 2021-08-26 | Stop reason: HOSPADM

## 2021-08-26 RX ORDER — FENTANYL CITRATE/PF 50 MCG/ML
12.5 SYRINGE (ML) INJECTION
Status: DISCONTINUED | OUTPATIENT
Start: 2021-08-26 | End: 2021-08-26 | Stop reason: HOSPADM

## 2021-08-26 RX ORDER — DIPHENHYDRAMINE HYDROCHLORIDE 50 MG/ML
12.5 INJECTION INTRAMUSCULAR; INTRAVENOUS ONCE AS NEEDED
Status: DISCONTINUED | OUTPATIENT
Start: 2021-08-26 | End: 2021-08-26 | Stop reason: HOSPADM

## 2021-08-26 RX ORDER — FENTANYL CITRATE/PF 50 MCG/ML
25 SYRINGE (ML) INJECTION
Status: DISCONTINUED | OUTPATIENT
Start: 2021-08-26 | End: 2021-08-26 | Stop reason: HOSPADM

## 2021-08-26 RX ORDER — MIDAZOLAM HYDROCHLORIDE 2 MG/2ML
INJECTION, SOLUTION INTRAMUSCULAR; INTRAVENOUS AS NEEDED
Status: DISCONTINUED | OUTPATIENT
Start: 2021-08-26 | End: 2021-08-26

## 2021-08-26 RX ORDER — LIDOCAINE HYDROCHLORIDE AND EPINEPHRINE BITARTRATE 20; .01 MG/ML; MG/ML
INJECTION, SOLUTION SUBCUTANEOUS AS NEEDED
Status: DISCONTINUED | OUTPATIENT
Start: 2021-08-26 | End: 2021-08-26 | Stop reason: HOSPADM

## 2021-08-26 RX ORDER — DEXAMETHASONE SODIUM PHOSPHATE 4 MG/ML
4 INJECTION, SOLUTION INTRA-ARTICULAR; INTRALESIONAL; INTRAMUSCULAR; INTRAVENOUS; SOFT TISSUE ONCE AS NEEDED
Status: DISCONTINUED | OUTPATIENT
Start: 2021-08-26 | End: 2021-08-26 | Stop reason: HOSPADM

## 2021-08-26 RX ORDER — PROPOFOL 10 MG/ML
INJECTION, EMULSION INTRAVENOUS CONTINUOUS PRN
Status: DISCONTINUED | OUTPATIENT
Start: 2021-08-26 | End: 2021-08-26

## 2021-08-26 RX ORDER — MEPERIDINE HYDROCHLORIDE 25 MG/ML
12.5 INJECTION INTRAMUSCULAR; INTRAVENOUS; SUBCUTANEOUS
Status: DISCONTINUED | OUTPATIENT
Start: 2021-08-26 | End: 2021-08-26 | Stop reason: HOSPADM

## 2021-08-26 RX ORDER — SODIUM CHLORIDE, SODIUM LACTATE, POTASSIUM CHLORIDE, CALCIUM CHLORIDE 600; 310; 30; 20 MG/100ML; MG/100ML; MG/100ML; MG/100ML
75 INJECTION, SOLUTION INTRAVENOUS CONTINUOUS
Status: DISCONTINUED | OUTPATIENT
Start: 2021-08-26 | End: 2021-08-26 | Stop reason: HOSPADM

## 2021-08-26 RX ADMIN — SODIUM CHLORIDE, SODIUM LACTATE, POTASSIUM CHLORIDE, AND CALCIUM CHLORIDE: .6; .31; .03; .02 INJECTION, SOLUTION INTRAVENOUS at 14:25

## 2021-08-26 RX ADMIN — FENTANYL CITRATE 100 MCG: 50 INJECTION, SOLUTION INTRAMUSCULAR; INTRAVENOUS at 14:25

## 2021-08-26 RX ADMIN — PROPOFOL 100 MCG/KG/MIN: 10 INJECTION, EMULSION INTRAVENOUS at 14:30

## 2021-08-26 RX ADMIN — MIDAZOLAM 2 MG: 1 INJECTION INTRAMUSCULAR; INTRAVENOUS at 14:25

## 2021-08-26 NOTE — INTERVAL H&P NOTE
H&P reviewed  After examining the patient I find no changes in the patients condition since the H&P had been written      Vitals:    08/26/21 1311   BP: 144/86   Pulse: 57   Resp: 20   Temp: (!) 96 6 °F (35 9 °C)   SpO2: 99%

## 2021-08-26 NOTE — ANESTHESIA POSTPROCEDURE EVALUATION
Post-Op Assessment Note    CV Status:  Stable  Pain Score: 1    Pain management: adequate     Mental Status:  Alert and awake   Hydration Status:  Euvolemic   PONV Controlled:  Controlled   Airway Patency:  Patent      Post Op Vitals Reviewed: Yes      Staff: Anesthesiologist, CRNA         No complications documented      /75 (08/26/21 1530)    Temp      Pulse 58 (08/26/21 1530)   Resp (!) 11 (08/26/21 1530)    SpO2 96 % (08/26/21 1530)

## 2021-08-26 NOTE — DISCHARGE INSTRUCTIONS
POST-OPERATIVE INSTRUCTIONS   EYELID SURGERY     Diet: While post-operative nausea is rare with this type of anesthesia, it is wise to start your diet by drinking clear liquids after surgery (e g , 7-Up, Gatorade, ginger ale, etc )  If clear liquids are tolerated well without nausea or vomiting, you may advance towards a regular diet  Avoid fatty foods (e g , milk, pizza, hamburgers, etc ) on the day of surgery or as long as nausea persists  Pain control: Many eyelid procedures only require Extra Strength Tylenol (acetaminophen) for postoperative pain control  For those patients with more extensive eyelid reconstruction, a prescription for a pain reliever is often given  Patients may choose to take the prescribed pain reliever OR Extra Strength Tylenol, BUT NOT both together  Unless specifically instructed, aspirin products such as Rusty, Bufferin, Anacin, Excedrin, Advil, Nuprin, Motrin, Ibuprofen, etc , should be avoided for at least one week after surgery  Any other medications which you were taking prior to surgery, should be continued on their regular schedule, unless you were instructed otherwise  Activity: Keep your activity to a minimum for the first 24 to 48 hours after surgery  No heavy lifting, bending, or straining until after you have your post-op visit  You may shower 24 hours after surgery  Wash your hair in the shower with your back to the water  Wash your face gently with a washcloth  Try not to get the stitches wet until you are seen for your post-op appointment  Whenever lying down or sleeping, keep your head elevated on 2 or 3 pillows such that your head is always elevated above the level of your chest      Wound care: Apply a cold compress to the surgical site as much as possible for 48 hours following surgery (20 minutes on/20 minutes off while awake)  A folded washcloth soaked in ice-cold water and wrung out or a bag of frozen peas works well for this   Do not apply ice directly to the skin  Once the ice packs are discontinued, you can start using warm, moist compresses over the affected area for 20 minutes at a time, four times a day  This helps with swelling and can be continued until you first post-operative visit  Avoid placing a patch on the eye if possible  A patch is usually not necessary and may delay recognition of potential complications  A small tube of eye ointment is prescribed after surgery  This is to be gently applied to the stitches twice  A Q-tip works well for this  If the eyes feel irritated, the ointment or artificial tears (which can be bought over-the-counter at the pharmacy) are safe to use in the eye for lubrication as often as needed  This will likely result in blurred vision but will go away once the ointment is stopped  EXCEPTION: If a patch is placed over the eye by the surgeon, do NOT apply cold compresses or ointment  Leave the patch undisturbed  A physician will remove the patch at your first post-operative visit  Follow up: If your surgery was done on an outpatient basis, you should receive a phone call the day after surgery to check on your progress and to arrange for a post-operative clinic appointment  The first post-operative visit will be one to two weeks after surgery to check the incisions and remove sutures if necessary  A second post-operative visit six to eight weeks after surgery will assess the final surgical result  The following problems should be reported to the office as soon as possible:   1  Continuous, brisk bleeding  Please note that some oozing or drainage is common following a surgical procedure  Do not try to clean dried blood from the surgical site  This will likely only create more bleeding  2  Temperature (fever) over 101?     3  Excessive pain at surgical site not relieved by the pain medication, especially if associated with protrusion of the eyeball  4  Sudden loss of vision   Please note that some blurriness is expected after surgery due to the ointment used around the eyes  All patients should be able to check their vision even with eyelid swelling  5  Double vision     If a problem should arise or you have a question, please call:     · Office [phone]    I hope that your surgery experience with us is a positive one  Please contact my office with any questions       Malick Eduardo MD, MPhil

## 2021-08-26 NOTE — OP NOTE
OPERATIVE REPORT  PATIENT NAME: Mahi Méndez    :  1955  MRN: [id number]  Pt Location:  OR ROOM 07    SURGERY DATE: 2021    Surgeon(s) and Role:     Perla Heart MD - Primary    Preop Diagnosis:  Dermatochalasis of right upper eyelid [H02 831]  Dermatochalasis of left upper eyelid [H02 834]    Post-Op Diagnosis Codes:     * Dermatochalasis of right upper eyelid [H02 831]     * Dermatochalasis of left upper eyelid [H02 834]    Procedure(s) (LRB):  BLEPHAROPLASTY UPPER (Bilateral)    Specimen(s):  * No specimens in log *    Estimated Blood Loss:   Minimal    Drains:  * No LDAs found *    Anesthesia Type:   IV Sedation with Anesthesia    Operative Indications:  Dermatochalasis of right upper eyelid [H02 831]  Dermatochalasis of left upper eyelid [H02 834]    Operative Findings:  Dermatochalasis of right upper eyelid [H02 831]  Dermatochalasis of right lower eyelid [P44 960]    Complications:   None    Procedure and Technique: This is a patient who presented to clinic with visually significant bilateral upper lid dermatochalasis  The patient reported that her activities of daily living and visual function or decreased due to the heavy upper eyelids  The risks benefits and alternatives of surgery were discussed at length with the patient  All questions were answered  Patient signed informed consent  The patient was taken to the operating room and placed on the table in the supine position where anesthesia was induced  2% lidocaine with epinephrine was injected at the surgical site and the patient was prepped and draped in the usual manner for ocular facial surgery  A 15 Bard-Pedro blade incision was made along the left upper lid skin crease 8 mm superior to the lid margin  A 2nd incision was made 12 mm inferior to the brow skin across its entire horizontal extent, and the intervening skin was excised with Letty scissors    The orbital septum was opened and herniating fatty tissue was excised from the medial fat pad  The skin was closed using 5 fast-absorbing plain gut suture    The same procedure was then performed on the right upper eyelid  Hemostasis was achieved with electrocautery  The patient was then awakened and taken from the operating room in good condition having tolerated the procedure well  I was present for the entire procedure      Complications:   None     Patient Disposition:  PACU     SIGNATURE: Dee Dee Donahue MD  DATE: August 26, 2021  TIME: 1:28 PM

## 2021-09-15 ENCOUNTER — CONSULT (OUTPATIENT)
Dept: MULTI SPECIALTY CLINIC | Facility: CLINIC | Age: 66
End: 2021-09-15

## 2021-09-15 VITALS — WEIGHT: 189 LBS | HEIGHT: 64 IN | BODY MASS INDEX: 32.27 KG/M2

## 2021-09-15 DIAGNOSIS — L71.9 ROSACEA: ICD-10-CM

## 2021-09-15 PROCEDURE — 99243 OFF/OP CNSLTJ NEW/EST LOW 30: CPT | Performed by: STUDENT IN AN ORGANIZED HEALTH CARE EDUCATION/TRAINING PROGRAM

## 2021-09-15 RX ORDER — PIMECROLIMUS 10 MG/G
CREAM TOPICAL 2 TIMES DAILY
Qty: 30 G | Refills: 0 | Status: CANCELLED | OUTPATIENT
Start: 2021-09-15

## 2021-09-15 NOTE — PATIENT INSTRUCTIONS
ROSACEA    Assessment and Plan:  Based on a thorough discussion of this condition and the management approach to it (including a comprehensive discussion of the known risks, side effects and potential benefits of treatment), the patient (family) agrees to implement the following specific plan:   Exam consistent with erythemotelangiectatic rosea; mild   azeleic acid15%/niacinamide 2% cream twice a day [facility]   Recommended regular sunscreen use with zinc oxide    3 month follow-up : notify Dr Smita Martínez Call Dr Stefani Castaneda ([phone]) if medication does not come in timely manner    Rosacea is a chronic rash affecting the mid-face including the nose, cheeks, chin, forehead, and eyelids  The incidence is usually greatest between the ages of 30-60 years and is more common in people with fair skin  Common characteristics include redness, telangiectasias, papules and pustules over affected areas  Rosacea may look similar to acne, but there is a lack of comedones  Occasionally the eyes may also be involved in ocular rosacea  In advanced disease, enlargement of the sebaceous glands in the nose, termed rhinophyma, may be present  Rosacea results in red spots (papules) and sometimes pustules over the face, but unlike acne there are no blackheads, whiteheads, or cystic nodules  Patients often experience increased facial flushing with prominent blood vessels (erythematotelangiectatic rosacea) and dry, sensitive skin  These symptoms are exacerbated by sun exposure, hot or spicy foods, topical steroids and oil-based facial products  In ocular rosacea, eyelids may be red and sore due to conjunctivitis, keratitis, and episcleritis  If rhinophyma develops due to enlargement of sebaceous glands, the patient may have an enlarged and irregularly shaped nose with prominent pores   In rosacea that is refractory to treatment, patients can develop persistent redness and swelling of the face due to lymphatic obstruction (Morbihan disease)  Distribution around the cheeks may be confused with the malar or butterfly rash of lupus  However, the rash of lupus spares the nasal creases and lacks papules and pustules  If signs of photosensitivity, oral ulcers, arthritis, and kidney dysfunction are present then consider referral to a rheumatologist      There are many potential causes of rosacea including genetic, environmental, vascular, and inflammatory factors   These include, but are not limited to:   Chronic exposure to ultraviolet radiation    Increased immune responses in the form of cathelicidins that promote vessel dilation and infiltration with white blood cells (neutrophils) into the dermis   Increased matrix metalloproteinases such as collagen and elastase that remodel normal tissue may contribute to inflammation of the skin making it thicker and harder   There is some evidence to suggest that increased numbers of demodex mites on patient skin may contribute to rosacea papules     General Treatment Approach    Avoid exacerbating factors such as heat, spicy foods, and alcohol    Use daily SPF30+ sunscreen and other methods of coverage for sun protection   Use water-based make-up    Avoid applying topical steroids to affected areas as they can cause perioral dermatitis and exacerbate rosacea     Topical Treatment Approach   Metronidazole cream or gel by itself or in combination with oral antibiotics for more severe cases   Azelaic acid cream or lotion is effective for mild inflammatory rosacea when applied twice daily to affected areas   Brimonidine gel and oxymetazoline hydrochloride cream can reduce facial redness temporarily    Ivermectin cream can treat papulopustular rosacea by controlling demodex mites and inflammation    Pimecrolimus cream or tacrolimus ointment twice a day for 2-3 months can help reduce inflammation    Oral Treatment Approach   Antibiotics such as doxycycline, minocycline, or erythromycin for 1-3 months   Clonidine and carvedilol can help reduce facial flushing and are generally well tolerated  Common side effects include low blood pressure, gastrointestinal upset, dry eyes, blurred vision and low heart rate   Isotretinoin at low doses can be effective for long term treatment when antibiotics fail  Side effects may make it unsuitable for some patients   NSAIDs such as diclofenac can help reduce discomfort and redness in the skin       Procedural/Surgical Treatment Approach    Vascular lasers or intense pulsed light treatment may be used to treat persistent telangiectasia and papulopustular rosacea   Plastic surgery and carbon dioxide lasers may be used to treat rhinophyma

## 2021-09-15 NOTE — PROGRESS NOTES
[facility] Note     Patient Name: Akash Clemente  Encounter Date: 9/15/2021     Have you been cared for by a [facility] Dermatologist in the last 3 years and, if so, which one? No    · Have you traveled outside of the [address] [place] [address] in the past 3 months or outside of the [facility] area in the last 2 weeks? No     May we call your Preferred Phone number to discuss your specific medical information? Yes     May we leave a detailed message that includes your specific medical information? Yes      Today's Chief Concerns:   Concern #1:  rosacea   Concern #2:      Past Medical History:  Have you personally ever had or currently have any of the following? · Skin cancer (such as Melanoma, Basal Cell Carcinoma, Squamous Cell Carcinoma? (If Yes, please provide more detail)- No  · Eczema: No  · Psoriasis: No  · HIV/AIDS: No  · Hepatitis B or C: No  · Tuberculosis: No  · Systemic Immunosuppression such as Diabetes, Biologic or Immunotherapy, Chemotherapy, Organ Transplantation, Bone Marrow Transplantation (If YES, please provide more detail): No  · Radiation Treatment (If YES, please provide more detail): No  · Any other major medical conditions/concerns? (If Yes, which types)- YES, scoliosis, hypertension, angina, glaucoma    Social History:     What is/was your primary occupation? Does not work     What are your hobbies/past-times? walking    Family History:  Have any of your "first degree relatives" (parent, brother, sister, or child) had any of the following       · Skin cancer such as Melanoma or Merkel Cell Carcinoma or Pancreatic Cancer? No  · Eczema, Asthma, Hay Fever or Seasonal Allergies: No  · Psoriasis or Psoriatic Arthritis: No  · Do any other medical conditions seem to run in your family? If Yes, what condition and which relatives?   YES, diabetes    Current Medications:         Current Outpatient Medications:     acetaminophen (TYLENOL) 500 mg tablet, Take 500 mg by mouth every 6 (six) hours as needed for mild pain, Disp: , Rfl:     aluminum-magnesium hydroxide 200-200 MG/5ML suspension, Take 5 mL by mouth every 6 (six) hours as needed for heartburn, Disp: , Rfl:     apixaban (Eliquis) 5 mg, Take 1 tablet (5 mg total) by mouth every 12 (twelve) hours, Disp: 60 tablet, Rfl: 3    aspirin 81 MG tablet, Take 81 mg by mouth daily 8/20/21 Pt was instructed by [facility] - by Mont Kanner to [facility] ASA one week prior to surgery  Verified with cardiology - under chart review /letters   Pts last dose of ASA today 8/20/21- instructed pts daughter Elicia Stubbs - ASA needs to be HELD starting tomorrow up to and including DOS, Disp: , Rfl:     atorvastatin (LIPITOR) 80 mg tablet, Take 1 tablet (80 mg total) by mouth daily, Disp: 90 tablet, Rfl: 1    conjugated estrogens (PREMARIN) vaginal cream, Apply a pea size amount of the estrogen cream to the opening of the vagina three nights per week , Disp: , Rfl:     estradiol (ESTRACE) 0 1 mg/g vaginal cream, , Disp: , Rfl:     estradiol (ESTRACE) 0 1 mg/g vaginal cream, INSERT 1 GRAM INTO THE VAGINA 2 TIMES A WEEK, Disp: , Rfl:     fluticasone (FLONASE) 50 mcg/act nasal spray, 1 spray into each nostril daily, Disp: 1 Bottle, Rfl: 1    latanoprost (XALATAN) 0 005 % ophthalmic solution, Administer 1 drop to both eyes daily at bedtime , Disp: , Rfl:     loratadine (CLARITIN) 10 mg tablet, Take 1 tablet (10 mg total) by mouth daily, Disp: 90 tablet, Rfl: 0    losartan (COZAAR) 100 MG tablet, Take 1 tablet (100 mg total) by mouth daily, Disp: 30 tablet, Rfl: 3    meclizine (ANTIVERT) 12 5 MG tablet, Take 1 tablet (12 5 mg total) by mouth every 8 (eight) hours as needed for dizziness, Disp: 20 tablet, Rfl: 0    metoprolol tartrate (LOPRESSOR) 50 mg tablet, Take 1 tablet (50 mg total) by mouth every 12 (twelve) hours, Disp: 60 tablet, Rfl: 2    metroNIDAZOLE (METROGEL) 1 % gel, Apply topically daily, Disp: 45 g, Rfl: 0    nitroglycerin (NITROSTAT) 0 4 mg SL tablet, Place 1 tablet (0 4 mg total) under the tongue every 5 (five) minutes as needed for chest pain (Patient taking differently: Place 0 4 mg under the tongue every 5 (five) minutes as needed for chest pain 8/20/21 Per pt and pts daughter - pt has not used in months ), Disp: 90 tablet, Rfl: 0    omeprazole (PriLOSEC) 40 MG capsule, Take 1 capsule (40 mg total) by mouth daily, Disp: 30 capsule, Rfl: 3    polymyxin b-trimethoprim (POLYTRIM) ophthalmic solution, , Disp: , Rfl:     prednisoLONE acetate (PRED FORTE) 1 % ophthalmic suspension, , Disp: , Rfl:     ranolazine (RANEXA) 500 mg 12 hr tablet, Take 1 tablet (500 mg total) by mouth 2 (two) times a day, Disp: 60 tablet, Rfl: 8      Review of Systems:  Have you recently had or currently have any of the following? If YES, what are you doing for the problem? · Fever, chills or unintended weight loss: No  · Sudden loss or change in your vision: No  · Nausea, vomiting or blood in your stool: No  · Painful or swollen joints: No  · Wheezing or cough: No  · Changing mole or non-healing wound: No  · Nosebleeds: No  · Excessive sweating: No  · Easy or prolonged bleeding? No  · Over the last 2 weeks, how often have you been bothered by the following problems? · Taking little interest or pleasure in doing things: 1 - Not at All  · Feeling down, depressed, or hopeless: 1 - Not at All  · Rapid heartbeat with epinephrine:  No        · Any known allergies? Allergies   Allergen Reactions    Ibuprofen Other (See Comments)     Cannot take per her doctor- due to a "heart condition "   ·       Physical Exam:     Was a chaperone (Derm Clinical Assistant) present throughout the entire Physical Exam? NO     Did the Dermatology Team specifically  the patient on the importance of a Full Skin Exam to be sure that nothing is missed clinically?  NO}  o Did the patient ultimately request or accept a Full Skin Exam? NO  o Did the patient specifically refuse to have the areas "under-the-bra" examined by the Dermatologist? Tejal Alonso  o Did the patient specifically refuse to have the areas "under-the-underwear" examined by the Dermatologist? No    CONSTITUTIONAL:   There were no vitals filed for this visit  Today's Height: 5 ft 4 inches  Today's Weight (in kilograms):   189 lbs    PSYCH: Normal mood and affect  EYES: Normal conjunctiva  ENT: Normal lips and oral mucosa  CARDIOVASCULAR: No edema  RESPIRATORY: Normal respirations  HEME/LYMPH/IMMUNO:  No regional lymphadenopathy except as noted below in "ASSESSMENT AND PLAN BY DIAGNOSIS"    SKIN:  FULL ORGAN SYSTEM EXAM   Hair, Scalp, Ears, Face Normal except as noted below in Assessment        Assessment and Plan by Diagnosis:    History of Present Condition:     Duration:  How long has this been an issue for you?    o  2 years   Location Affected:  Where on the body is this affecting you?    o  face   Quality:  Is there any bleeding, pain, itch, burning/irritation, or redness associated with the skin lesion? o  no   Severity:  Describe any bleeding, pain, itch, burning/irritation, or redness on a scale of 1 to 10 (with 10 being the worst)  o  n/a   Timing:  Does this condition seem to be there pretty constantly or do you notice it more at specific times throughout the day? o  constant   Context:  Have you ever noticed that this condition seems to be associated with specific activities you do? o  worse in hot temperatures   Modifying Factors:    o Anything that seems to make the condition worse?    -  hot environments  o What have you tried to d o to make the condition better?   Metronidazole gel with no relief    Associated Signs and Symptoms:  Does this skin lesion seem to be associated with any of the following:  o none    ROSACEA    Physical Exam:   Anatomic Location Affected:  face   Morphological Description:  Mildly erythematous patches   Pertinent Positives:   Pertinent Negatives: Additional History of Present Condition:  Pt reports 2 year history of rosacea  Pt went to PCP in 10/2020 where she was prescribed metronidazole 1% gel daily, which she tried for 2 months without relief  Skin redness worsens with hot environments and sun exposure  Assessment and Plan:  Based on a thorough discussion of this condition and the management approach to it (including a comprehensive discussion of the known risks, side effects and potential benefits of treatment), the patient (family) agrees to implement the following specific plan:   Exam consistent with erythemotelangiectatic rosea; mild   azeleic acid15%/niacinamide 2% cream twice a day prescribed through [facility] [facility]   Recommended regular sunscreen use with zinc oxide    3 month follow-up : notify Dr Dominic Vasquez is a chronic rash affecting the mid-face including the nose, cheeks, chin, forehead, and eyelids  The incidence is usually greatest between the ages of 30-60 years and is more common in people with fair skin  Common characteristics include redness, telangiectasias, papules and pustules over affected areas  Rosacea may look similar to acne, but there is a lack of comedones  Occasionally the eyes may also be involved in ocular rosacea  In advanced disease, enlargement of the sebaceous glands in the nose, termed rhinophyma, may be present  Rosacea results in red spots (papules) and sometimes pustules over the face, but unlike acne there are no blackheads, whiteheads, or cystic nodules  Patients often experience increased facial flushing with prominent blood vessels (erythematotelangiectatic rosacea) and dry, sensitive skin  These symptoms are exacerbated by sun exposure, hot or spicy foods, topical steroids and oil-based facial products  In ocular rosacea, eyelids may be red and sore due to conjunctivitis, keratitis, and episcleritis   If rhinophyma develops due to enlargement of sebaceous glands, the patient may have an enlarged and irregularly shaped nose with prominent pores  In rosacea that is refractory to treatment, patients can develop persistent redness and swelling of the face due to lymphatic obstruction (Morbihan disease)  Distribution around the cheeks may be confused with the malar or butterfly rash of lupus  However, the rash of lupus spares the nasal creases and lacks papules and pustules  If signs of photosensitivity, oral ulcers, arthritis, and kidney dysfunction are present then consider referral to a rheumatologist      There are many potential causes of rosacea including genetic, environmental, vascular, and inflammatory factors   These include, but are not limited to:   Chronic exposure to ultraviolet radiation    Increased immune responses in the form of cathelicidins that promote vessel dilation and infiltration with white blood cells (neutrophils) into the dermis   Increased matrix metalloproteinases such as collagen and elastase that remodel normal tissue may contribute to inflammation of the skin making it thicker and harder   There is some evidence to suggest that increased numbers of demodex mites on patient skin may contribute to rosacea papules     General Treatment Approach    Avoid exacerbating factors such as heat, spicy foods, and alcohol    Use daily SPF30+ sunscreen and other methods of coverage for sun protection   Use water-based make-up    Avoid applying topical steroids to affected areas as they can cause perioral dermatitis and exacerbate rosacea     Topical Treatment Approach   Metronidazole cream or gel by itself or in combination with oral antibiotics for more severe cases   Azelaic acid cream or lotion is effective for mild inflammatory rosacea when applied twice daily to affected areas   Brimonidine gel and oxymetazoline hydrochloride cream can reduce facial redness temporarily    Ivermectin cream can treat papulopustular rosacea by controlling demodex mites and inflammation    Pimecrolimus cream or tacrolimus ointment twice a day for 2-3 months can help reduce inflammation    Oral Treatment Approach   Antibiotics such as doxycycline, minocycline, or erythromycin for 1-3 months   Clonidine and carvedilol can help reduce facial flushing and are generally well tolerated  Common side effects include low blood pressure, gastrointestinal upset, dry eyes, blurred vision and low heart rate   Isotretinoin at low doses can be effective for long term treatment when antibiotics fail  Side effects may make it unsuitable for some patients   NSAIDs such as diclofenac can help reduce discomfort and redness in the skin       Procedural/Surgical Treatment Approach    Vascular lasers or intense pulsed light treatment may be used to treat persistent telangiectasia and papulopustular rosacea   Plastic surgery and carbon dioxide lasers may be used to treat rhinophyma

## (undated) DEVICE — GLOVE INDICATOR PI UNDERGLOVE SZ 8.5 BLUE

## (undated) DEVICE — UNDERGLOVE PROTEXIS  BLUE SZ 7

## (undated) DEVICE — GLOVE SRG DERMASSURE SZ 7

## (undated) DEVICE — ASTOUND STANDARD SURGICAL GOWN, XXL: Brand: CONVERTORS

## (undated) DEVICE — SCD SEQUENTIAL COMPRESSION COMFORT SLEEVE MEDIUM KNEE LENGTH: Brand: KENDALL SCD

## (undated) DEVICE — SKIN MARKER DUAL TIP WITH RULER CAP, FLEXIBLE RULER AND LABELS: Brand: DEVON

## (undated) DEVICE — LIGHT GLOVE GREEN

## (undated) DEVICE — SPONGE 4 X 4 XRAY 16 PLY STRL LF RFD

## (undated) DEVICE — CABLE BIPOLAR DISP MEGADYNE

## (undated) DEVICE — WIPES INSTRUMENT EYE DRAIN

## (undated) DEVICE — 1820 FOAM BLOCK NEEDLE COUNTER: Brand: DEVON

## (undated) DEVICE — INTENDED FOR TISSUE SEPARATION, AND OTHER PROCEDURES THAT REQUIRE A SHARP SURGICAL BLADE TO PUNCTURE OR CUT.: Brand: BARD-PARKER SAFETY BLADES SIZE 15, STERILE

## (undated) DEVICE — GLOVE PI ULTRA TOUCH SZ.6.5

## (undated) DEVICE — COTTON TIP APPLICTOR 2 PK

## (undated) DEVICE — NEEDLE 25G X 1 1/2

## (undated) DEVICE — SYRINGE 10ML LL CONTROL TOP

## (undated) DEVICE — ELECTRODE NEEDLE MOD E-Z CLEAN 2.75IN 7CM -0013M

## (undated) DEVICE — STERILE POLYISOPRENE POWDER-FREE SURGICAL GLOVES: Brand: PROTEXIS

## (undated) DEVICE — BASIC PACK: Brand: CONVERTORS

## (undated) DEVICE — COTTON TIP APPLICATORS: Brand: DEROYAL

## (undated) DEVICE — GLOVE SRG LF STRL BGL SKNSNS 7 PF

## (undated) DEVICE — PENCIL SMOKE EVAC TELESCOPING W/TUBING

## (undated) DEVICE — OCCLUSIVE GAUZE STRIP,3% BISMUTH TRIBROMOPHENATE IN PETROLATUM BLEND: Brand: XEROFORM

## (undated) DEVICE — SUT SILK 4-0 G-3 783G

## (undated) DEVICE — TIBURON SPLIT SHEET: Brand: CONVERTORS

## (undated) DEVICE — PAD GROUNDING ADULT

## (undated) DEVICE — NEEDLE BLUNT 18 G X 1 1/2IN

## (undated) DEVICE — GLOVE PI ULTRA TOUCH SZ.8.5

## (undated) DEVICE — DRAPE STERI 1010 18IN X 12IN

## (undated) DEVICE — STERILE POLYISOPRENE POWDER-FREE SURGICAL GLOVES WITH EMOLLIENT COATING: Brand: PROTEXIS

## (undated) DEVICE — SUT PLAIN 5-0 PC-1 18 IN 1915G